# Patient Record
Sex: MALE | Race: WHITE | Employment: FULL TIME | URBAN - METROPOLITAN AREA
[De-identification: names, ages, dates, MRNs, and addresses within clinical notes are randomized per-mention and may not be internally consistent; named-entity substitution may affect disease eponyms.]

---

## 2019-03-11 ENCOUNTER — HOSPITAL ENCOUNTER (INPATIENT)
Age: 34
LOS: 2 days | Discharge: HOME | DRG: 065 | End: 2019-03-13
Attending: EMERGENCY MEDICINE | Admitting: INTERNAL MEDICINE
Payer: COMMERCIAL

## 2019-03-11 ENCOUNTER — APPOINTMENT (OUTPATIENT)
Dept: CT IMAGING | Age: 34
DRG: 065 | End: 2019-03-11
Payer: COMMERCIAL

## 2019-03-11 ENCOUNTER — APPOINTMENT (OUTPATIENT)
Dept: MRI IMAGING | Age: 34
DRG: 065 | End: 2019-03-11
Payer: COMMERCIAL

## 2019-03-11 DIAGNOSIS — Q21.12 PFO (PATENT FORAMEN OVALE): ICD-10-CM

## 2019-03-11 DIAGNOSIS — I63.9 CEREBROVASCULAR ACCIDENT (CVA), UNSPECIFIED MECHANISM (HCC): Primary | ICD-10-CM

## 2019-03-11 PROBLEM — D72.829 LEUKOCYTOSIS: Status: ACTIVE | Noted: 2019-03-11

## 2019-03-11 PROBLEM — R51.9 HEAD ACHE: Status: ACTIVE | Noted: 2019-03-11

## 2019-03-11 LAB
A/G RATIO: 1.6 (ref 1.1–2.2)
ALBUMIN SERPL-MCNC: 4.5 G/DL (ref 3.4–5)
ALP BLD-CCNC: 50 U/L (ref 40–129)
ALT SERPL-CCNC: 17 U/L (ref 10–40)
AMPHETAMINE SCREEN, URINE: ABNORMAL
ANION GAP SERPL CALCULATED.3IONS-SCNC: 12 MMOL/L (ref 3–16)
AST SERPL-CCNC: 16 U/L (ref 15–37)
BARBITURATE SCREEN URINE: ABNORMAL
BASOPHILS ABSOLUTE: 0.1 K/UL (ref 0–0.2)
BASOPHILS RELATIVE PERCENT: 1.1 %
BENZODIAZEPINE SCREEN, URINE: ABNORMAL
BILIRUB SERPL-MCNC: 0.5 MG/DL (ref 0–1)
BUN BLDV-MCNC: 10 MG/DL (ref 7–20)
CALCIUM SERPL-MCNC: 9.4 MG/DL (ref 8.3–10.6)
CANNABINOID SCREEN URINE: POSITIVE
CHLORIDE BLD-SCNC: 103 MMOL/L (ref 99–110)
CO2: 25 MMOL/L (ref 21–32)
COCAINE METABOLITE SCREEN URINE: ABNORMAL
CREAT SERPL-MCNC: 0.7 MG/DL (ref 0.9–1.3)
EOSINOPHILS ABSOLUTE: 0 K/UL (ref 0–0.6)
EOSINOPHILS RELATIVE PERCENT: 0 %
GFR AFRICAN AMERICAN: >60
GFR NON-AFRICAN AMERICAN: >60
GLOBULIN: 2.9 G/DL
GLUCOSE BLD-MCNC: 102 MG/DL (ref 70–99)
HCT VFR BLD CALC: 45.7 % (ref 40.5–52.5)
HEMOGLOBIN: 15.3 G/DL (ref 13.5–17.5)
INR BLD: 1.03 (ref 0.86–1.14)
LYMPHOCYTES ABSOLUTE: 1.3 K/UL (ref 1–5.1)
LYMPHOCYTES RELATIVE PERCENT: 10.5 %
Lab: ABNORMAL
MCH RBC QN AUTO: 30 PG (ref 26–34)
MCHC RBC AUTO-ENTMCNC: 33.4 G/DL (ref 31–36)
MCV RBC AUTO: 90 FL (ref 80–100)
METHADONE SCREEN, URINE: ABNORMAL
MONOCYTES ABSOLUTE: 0.5 K/UL (ref 0–1.3)
MONOCYTES RELATIVE PERCENT: 3.9 %
NEUTROPHILS ABSOLUTE: 10.3 K/UL (ref 1.7–7.7)
NEUTROPHILS RELATIVE PERCENT: 84.5 %
OPIATE SCREEN URINE: ABNORMAL
OXYCODONE URINE: ABNORMAL
PDW BLD-RTO: 13.3 % (ref 12.4–15.4)
PH UA: 5
PHENCYCLIDINE SCREEN URINE: ABNORMAL
PLATELET # BLD: 351 K/UL (ref 135–450)
PMV BLD AUTO: 7.8 FL (ref 5–10.5)
POTASSIUM SERPL-SCNC: 4 MMOL/L (ref 3.5–5.1)
PROPOXYPHENE SCREEN: ABNORMAL
PROTHROMBIN TIME: 11.7 SEC (ref 9.8–13)
RBC # BLD: 5.08 M/UL (ref 4.2–5.9)
SODIUM BLD-SCNC: 140 MMOL/L (ref 136–145)
TOTAL PROTEIN: 7.4 G/DL (ref 6.4–8.2)
TROPONIN: <0.01 NG/ML
TROPONIN: <0.01 NG/ML
WBC # BLD: 12.1 K/UL (ref 4–11)

## 2019-03-11 PROCEDURE — 6360000002 HC RX W HCPCS: Performed by: NURSE PRACTITIONER

## 2019-03-11 PROCEDURE — 70498 CT ANGIOGRAPHY NECK: CPT

## 2019-03-11 PROCEDURE — 70450 CT HEAD/BRAIN W/O DYE: CPT

## 2019-03-11 PROCEDURE — 6370000000 HC RX 637 (ALT 250 FOR IP): Performed by: INTERNAL MEDICINE

## 2019-03-11 PROCEDURE — 36415 COLL VENOUS BLD VENIPUNCTURE: CPT

## 2019-03-11 PROCEDURE — 2060000000 HC ICU INTERMEDIATE R&B

## 2019-03-11 PROCEDURE — 80053 COMPREHEN METABOLIC PANEL: CPT

## 2019-03-11 PROCEDURE — 70551 MRI BRAIN STEM W/O DYE: CPT

## 2019-03-11 PROCEDURE — 6370000000 HC RX 637 (ALT 250 FOR IP): Performed by: EMERGENCY MEDICINE

## 2019-03-11 PROCEDURE — 84484 ASSAY OF TROPONIN QUANT: CPT

## 2019-03-11 PROCEDURE — 96375 TX/PRO/DX INJ NEW DRUG ADDON: CPT

## 2019-03-11 PROCEDURE — 80307 DRUG TEST PRSMV CHEM ANLYZR: CPT

## 2019-03-11 PROCEDURE — 70496 CT ANGIOGRAPHY HEAD: CPT

## 2019-03-11 PROCEDURE — 99291 CRITICAL CARE FIRST HOUR: CPT

## 2019-03-11 PROCEDURE — 96361 HYDRATE IV INFUSION ADD-ON: CPT

## 2019-03-11 PROCEDURE — 85610 PROTHROMBIN TIME: CPT

## 2019-03-11 PROCEDURE — 97535 SELF CARE MNGMENT TRAINING: CPT

## 2019-03-11 PROCEDURE — 93005 ELECTROCARDIOGRAM TRACING: CPT | Performed by: EMERGENCY MEDICINE

## 2019-03-11 PROCEDURE — 99406 BEHAV CHNG SMOKING 3-10 MIN: CPT

## 2019-03-11 PROCEDURE — 85025 COMPLETE CBC W/AUTO DIFF WBC: CPT

## 2019-03-11 PROCEDURE — 6370000000 HC RX 637 (ALT 250 FOR IP): Performed by: NURSE PRACTITIONER

## 2019-03-11 PROCEDURE — 96374 THER/PROPH/DIAG INJ IV PUSH: CPT

## 2019-03-11 PROCEDURE — 97165 OT EVAL LOW COMPLEX 30 MIN: CPT

## 2019-03-11 PROCEDURE — 2580000003 HC RX 258: Performed by: NURSE PRACTITIONER

## 2019-03-11 PROCEDURE — 6360000002 HC RX W HCPCS: Performed by: INTERNAL MEDICINE

## 2019-03-11 PROCEDURE — 2580000003 HC RX 258: Performed by: INTERNAL MEDICINE

## 2019-03-11 PROCEDURE — 6360000004 HC RX CONTRAST MEDICATION: Performed by: EMERGENCY MEDICINE

## 2019-03-11 RX ORDER — DIPHENHYDRAMINE HYDROCHLORIDE 50 MG/ML
25 INJECTION INTRAMUSCULAR; INTRAVENOUS ONCE
Status: COMPLETED | OUTPATIENT
Start: 2019-03-11 | End: 2019-03-11

## 2019-03-11 RX ORDER — METOCLOPRAMIDE HYDROCHLORIDE 5 MG/ML
10 INJECTION INTRAMUSCULAR; INTRAVENOUS ONCE
Status: COMPLETED | OUTPATIENT
Start: 2019-03-11 | End: 2019-03-11

## 2019-03-11 RX ORDER — KETOROLAC TROMETHAMINE 30 MG/ML
30 INJECTION, SOLUTION INTRAMUSCULAR; INTRAVENOUS ONCE
Status: COMPLETED | OUTPATIENT
Start: 2019-03-11 | End: 2019-03-11

## 2019-03-11 RX ORDER — NICOTINE 21 MG/24HR
1 PATCH, TRANSDERMAL 24 HOURS TRANSDERMAL DAILY
Status: DISCONTINUED | OUTPATIENT
Start: 2019-03-12 | End: 2019-03-11

## 2019-03-11 RX ORDER — SODIUM CHLORIDE 0.9 % (FLUSH) 0.9 %
10 SYRINGE (ML) INJECTION EVERY 12 HOURS SCHEDULED
Status: DISCONTINUED | OUTPATIENT
Start: 2019-03-11 | End: 2019-03-13 | Stop reason: HOSPADM

## 2019-03-11 RX ORDER — ATORVASTATIN CALCIUM 40 MG/1
40 TABLET, FILM COATED ORAL NIGHTLY
Status: DISCONTINUED | OUTPATIENT
Start: 2019-03-11 | End: 2019-03-13 | Stop reason: HOSPADM

## 2019-03-11 RX ORDER — ONDANSETRON 2 MG/ML
4 INJECTION INTRAMUSCULAR; INTRAVENOUS EVERY 6 HOURS PRN
Status: DISCONTINUED | OUTPATIENT
Start: 2019-03-11 | End: 2019-03-13 | Stop reason: HOSPADM

## 2019-03-11 RX ORDER — ASPIRIN 325 MG
325 TABLET ORAL ONCE
Status: COMPLETED | OUTPATIENT
Start: 2019-03-11 | End: 2019-03-11

## 2019-03-11 RX ORDER — NICOTINE 21 MG/24HR
1 PATCH, TRANSDERMAL 24 HOURS TRANSDERMAL DAILY
Status: DISCONTINUED | OUTPATIENT
Start: 2019-03-11 | End: 2019-03-13 | Stop reason: HOSPADM

## 2019-03-11 RX ORDER — 0.9 % SODIUM CHLORIDE 0.9 %
1000 INTRAVENOUS SOLUTION INTRAVENOUS ONCE
Status: COMPLETED | OUTPATIENT
Start: 2019-03-11 | End: 2019-03-11

## 2019-03-11 RX ORDER — SODIUM CHLORIDE 0.9 % (FLUSH) 0.9 %
10 SYRINGE (ML) INJECTION PRN
Status: DISCONTINUED | OUTPATIENT
Start: 2019-03-11 | End: 2019-03-13 | Stop reason: HOSPADM

## 2019-03-11 RX ORDER — ASPIRIN 325 MG
325 TABLET ORAL DAILY
Status: DISCONTINUED | OUTPATIENT
Start: 2019-03-11 | End: 2019-03-13 | Stop reason: HOSPADM

## 2019-03-11 RX ADMIN — KETOROLAC TROMETHAMINE 30 MG: 30 INJECTION, SOLUTION INTRAMUSCULAR at 17:37

## 2019-03-11 RX ADMIN — IOPAMIDOL 75 ML: 755 INJECTION, SOLUTION INTRAVENOUS at 12:51

## 2019-03-11 RX ADMIN — SODIUM CHLORIDE 1000 ML: 9 INJECTION, SOLUTION INTRAVENOUS at 10:15

## 2019-03-11 RX ADMIN — METOCLOPRAMIDE 10 MG: 5 INJECTION, SOLUTION INTRAMUSCULAR; INTRAVENOUS at 10:15

## 2019-03-11 RX ADMIN — KETOROLAC TROMETHAMINE 30 MG: 30 INJECTION, SOLUTION INTRAMUSCULAR; INTRAVENOUS at 10:14

## 2019-03-11 RX ADMIN — SODIUM CHLORIDE, PRESERVATIVE FREE 10 ML: 5 INJECTION INTRAVENOUS at 22:27

## 2019-03-11 RX ADMIN — ASPIRIN 325 MG: 325 TABLET, COATED ORAL at 13:53

## 2019-03-11 RX ADMIN — DIPHENHYDRAMINE HYDROCHLORIDE 25 MG: 50 INJECTION, SOLUTION INTRAMUSCULAR; INTRAVENOUS at 10:14

## 2019-03-11 RX ADMIN — ATORVASTATIN CALCIUM 40 MG: 40 TABLET, FILM COATED ORAL at 22:27

## 2019-03-11 ASSESSMENT — PAIN DESCRIPTION - LOCATION
LOCATION: HEAD

## 2019-03-11 ASSESSMENT — VISUAL ACUITY
OU: 20/20
OS: 20/25
OD: 20/25

## 2019-03-11 ASSESSMENT — PAIN DESCRIPTION - FREQUENCY: FREQUENCY: INTERMITTENT

## 2019-03-11 ASSESSMENT — PAIN DESCRIPTION - PROGRESSION: CLINICAL_PROGRESSION: GRADUALLY IMPROVING

## 2019-03-11 ASSESSMENT — PAIN SCALES - GENERAL
PAINLEVEL_OUTOF10: 10
PAINLEVEL_OUTOF10: 5
PAINLEVEL_OUTOF10: 2
PAINLEVEL_OUTOF10: 4
PAINLEVEL_OUTOF10: 5
PAINLEVEL_OUTOF10: 10
PAINLEVEL_OUTOF10: 7

## 2019-03-11 ASSESSMENT — PAIN DESCRIPTION - PAIN TYPE
TYPE: ACUTE PAIN

## 2019-03-11 ASSESSMENT — PAIN - FUNCTIONAL ASSESSMENT: PAIN_FUNCTIONAL_ASSESSMENT: PREVENTS OR INTERFERES WITH ALL ACTIVE AND SOME PASSIVE ACTIVITIES

## 2019-03-11 ASSESSMENT — PAIN DESCRIPTION - DESCRIPTORS: DESCRIPTORS: ACHING

## 2019-03-12 LAB
CHOLESTEROL, TOTAL: 140 MG/DL (ref 0–199)
HDLC SERPL-MCNC: 40 MG/DL (ref 40–60)
LDL CHOLESTEROL CALCULATED: 84 MG/DL
LV EF: 58 %
LVEF MODALITY: NORMAL
TRIGL SERPL-MCNC: 79 MG/DL (ref 0–150)
VLDLC SERPL CALC-MCNC: 16 MG/DL

## 2019-03-12 PROCEDURE — 99255 IP/OBS CONSLTJ NEW/EST HI 80: CPT | Performed by: PSYCHIATRY & NEUROLOGY

## 2019-03-12 PROCEDURE — 6370000000 HC RX 637 (ALT 250 FOR IP): Performed by: NURSE PRACTITIONER

## 2019-03-12 PROCEDURE — 2580000003 HC RX 258: Performed by: INTERNAL MEDICINE

## 2019-03-12 PROCEDURE — 36415 COLL VENOUS BLD VENIPUNCTURE: CPT

## 2019-03-12 PROCEDURE — 6360000002 HC RX W HCPCS: Performed by: INTERNAL MEDICINE

## 2019-03-12 PROCEDURE — 97161 PT EVAL LOW COMPLEX 20 MIN: CPT

## 2019-03-12 PROCEDURE — 2060000000 HC ICU INTERMEDIATE R&B

## 2019-03-12 PROCEDURE — 6370000000 HC RX 637 (ALT 250 FOR IP): Performed by: INTERNAL MEDICINE

## 2019-03-12 PROCEDURE — 93306 TTE W/DOPPLER COMPLETE: CPT

## 2019-03-12 PROCEDURE — 80061 LIPID PANEL: CPT

## 2019-03-12 RX ADMIN — ENOXAPARIN SODIUM 40 MG: 40 INJECTION SUBCUTANEOUS at 08:41

## 2019-03-12 RX ADMIN — SODIUM CHLORIDE, PRESERVATIVE FREE 10 ML: 5 INJECTION INTRAVENOUS at 21:11

## 2019-03-12 RX ADMIN — ASPIRIN 325 MG: 325 TABLET, COATED ORAL at 08:40

## 2019-03-12 RX ADMIN — ATORVASTATIN CALCIUM 40 MG: 40 TABLET, FILM COATED ORAL at 21:11

## 2019-03-12 RX ADMIN — SODIUM CHLORIDE, PRESERVATIVE FREE 10 ML: 5 INJECTION INTRAVENOUS at 08:41

## 2019-03-12 ASSESSMENT — PAIN SCALES - GENERAL
PAINLEVEL_OUTOF10: 0

## 2019-03-13 ENCOUNTER — ANESTHESIA EVENT (OUTPATIENT)
Dept: CARDIAC CATH/INVASIVE PROCEDURES | Age: 34
DRG: 065 | End: 2019-03-13
Payer: COMMERCIAL

## 2019-03-13 ENCOUNTER — ANESTHESIA (OUTPATIENT)
Dept: CARDIAC CATH/INVASIVE PROCEDURES | Age: 34
DRG: 065 | End: 2019-03-13
Payer: COMMERCIAL

## 2019-03-13 ENCOUNTER — TELEPHONE (OUTPATIENT)
Dept: CARDIOLOGY | Age: 34
End: 2019-03-13

## 2019-03-13 ENCOUNTER — APPOINTMENT (OUTPATIENT)
Dept: CARDIAC CATH/INVASIVE PROCEDURES | Age: 34
DRG: 065 | End: 2019-03-13
Payer: COMMERCIAL

## 2019-03-13 VITALS — OXYGEN SATURATION: 100 % | DIASTOLIC BLOOD PRESSURE: 47 MMHG | SYSTOLIC BLOOD PRESSURE: 89 MMHG

## 2019-03-13 VITALS
HEART RATE: 70 BPM | SYSTOLIC BLOOD PRESSURE: 113 MMHG | WEIGHT: 157.1 LBS | DIASTOLIC BLOOD PRESSURE: 72 MMHG | BODY MASS INDEX: 21.28 KG/M2 | TEMPERATURE: 97.5 F | RESPIRATION RATE: 16 BRPM | OXYGEN SATURATION: 97 % | HEIGHT: 72 IN

## 2019-03-13 LAB
EKG ATRIAL RATE: 75 BPM
EKG DIAGNOSIS: NORMAL
EKG P AXIS: 67 DEGREES
EKG P-R INTERVAL: 176 MS
EKG Q-T INTERVAL: 370 MS
EKG QRS DURATION: 92 MS
EKG QTC CALCULATION (BAZETT): 413 MS
EKG R AXIS: -22 DEGREES
EKG T AXIS: 49 DEGREES
EKG VENTRICULAR RATE: 75 BPM
LV EF: 58 %
LVEF MODALITY: NORMAL

## 2019-03-13 PROCEDURE — 3700000001 HC ADD 15 MINUTES (ANESTHESIA)

## 2019-03-13 PROCEDURE — 93315 ECHO TRANSESOPHAGEAL: CPT

## 2019-03-13 PROCEDURE — 93010 ELECTROCARDIOGRAM REPORT: CPT | Performed by: INTERNAL MEDICINE

## 2019-03-13 PROCEDURE — 99232 SBSQ HOSP IP/OBS MODERATE 35: CPT | Performed by: PSYCHIATRY & NEUROLOGY

## 2019-03-13 PROCEDURE — 2500000003 HC RX 250 WO HCPCS: Performed by: NURSE ANESTHETIST, CERTIFIED REGISTERED

## 2019-03-13 PROCEDURE — 2580000003 HC RX 258

## 2019-03-13 PROCEDURE — 3700000000 HC ANESTHESIA ATTENDED CARE

## 2019-03-13 PROCEDURE — 93325 DOPPLER ECHO COLOR FLOW MAPG: CPT

## 2019-03-13 PROCEDURE — 6370000000 HC RX 637 (ALT 250 FOR IP): Performed by: NURSE PRACTITIONER

## 2019-03-13 PROCEDURE — 6370000000 HC RX 637 (ALT 250 FOR IP): Performed by: INTERNAL MEDICINE

## 2019-03-13 PROCEDURE — 93320 DOPPLER ECHO COMPLETE: CPT

## 2019-03-13 PROCEDURE — 93970 EXTREMITY STUDY: CPT

## 2019-03-13 PROCEDURE — 6360000002 HC RX W HCPCS: Performed by: NURSE ANESTHETIST, CERTIFIED REGISTERED

## 2019-03-13 PROCEDURE — 2580000003 HC RX 258: Performed by: NURSE ANESTHETIST, CERTIFIED REGISTERED

## 2019-03-13 PROCEDURE — 2580000003 HC RX 258: Performed by: INTERNAL MEDICINE

## 2019-03-13 PROCEDURE — B24BZZ4 ULTRASONOGRAPHY OF HEART WITH AORTA, TRANSESOPHAGEAL: ICD-10-PCS | Performed by: INTERNAL MEDICINE

## 2019-03-13 RX ORDER — ASPIRIN 325 MG
325 TABLET ORAL DAILY
Qty: 30 TABLET | Refills: 3 | COMMUNITY
Start: 2019-03-14 | End: 2019-03-15

## 2019-03-13 RX ORDER — LIDOCAINE HYDROCHLORIDE 20 MG/ML
INJECTION, SOLUTION EPIDURAL; INFILTRATION; INTRACAUDAL; PERINEURAL PRN
Status: DISCONTINUED | OUTPATIENT
Start: 2019-03-13 | End: 2019-03-13 | Stop reason: SDUPTHER

## 2019-03-13 RX ORDER — ATORVASTATIN CALCIUM 40 MG/1
40 TABLET, FILM COATED ORAL NIGHTLY
Qty: 30 TABLET | Refills: 3 | Status: SHIPPED | OUTPATIENT
Start: 2019-03-13 | End: 2019-11-06 | Stop reason: SDUPTHER

## 2019-03-13 RX ORDER — SODIUM CHLORIDE 9 MG/ML
INJECTION, SOLUTION INTRAVENOUS CONTINUOUS PRN
Status: DISCONTINUED | OUTPATIENT
Start: 2019-03-13 | End: 2019-03-13 | Stop reason: SDUPTHER

## 2019-03-13 RX ORDER — PROPOFOL 10 MG/ML
INJECTION, EMULSION INTRAVENOUS PRN
Status: DISCONTINUED | OUTPATIENT
Start: 2019-03-13 | End: 2019-03-13 | Stop reason: SDUPTHER

## 2019-03-13 RX ADMIN — ASPIRIN 325 MG: 325 TABLET, COATED ORAL at 08:28

## 2019-03-13 RX ADMIN — LIDOCAINE HYDROCHLORIDE 40 MG: 20 INJECTION, SOLUTION EPIDURAL; INFILTRATION; INTRACAUDAL; PERINEURAL at 10:30

## 2019-03-13 RX ADMIN — PROPOFOL 30 MG: 10 INJECTION, EMULSION INTRAVENOUS at 10:42

## 2019-03-13 RX ADMIN — SODIUM CHLORIDE, PRESERVATIVE FREE 10 ML: 5 INJECTION INTRAVENOUS at 08:27

## 2019-03-13 RX ADMIN — PROPOFOL 30 MG: 10 INJECTION, EMULSION INTRAVENOUS at 10:35

## 2019-03-13 RX ADMIN — SODIUM CHLORIDE: 9 INJECTION, SOLUTION INTRAVENOUS at 10:21

## 2019-03-13 RX ADMIN — PROPOFOL 30 MG: 10 INJECTION, EMULSION INTRAVENOUS at 10:45

## 2019-03-13 RX ADMIN — PROPOFOL 30 MG: 10 INJECTION, EMULSION INTRAVENOUS at 10:40

## 2019-03-13 RX ADMIN — PROPOFOL 150 MG: 10 INJECTION, EMULSION INTRAVENOUS at 10:30

## 2019-03-13 RX ADMIN — PHENYLEPHRINE HYDROCHLORIDE 50 MCG: 10 INJECTION INTRAVENOUS at 10:47

## 2019-03-13 RX ADMIN — PROPOFOL 30 MG: 10 INJECTION, EMULSION INTRAVENOUS at 10:33

## 2019-03-13 ASSESSMENT — PAIN SCALES - GENERAL
PAINLEVEL_OUTOF10: 0
PAINLEVEL_OUTOF10: 0

## 2019-03-13 ASSESSMENT — LIFESTYLE VARIABLES: SMOKING_STATUS: 1

## 2019-03-15 ENCOUNTER — OFFICE VISIT (OUTPATIENT)
Dept: CARDIOLOGY CLINIC | Age: 34
End: 2019-03-15
Payer: COMMERCIAL

## 2019-03-15 VITALS
SYSTOLIC BLOOD PRESSURE: 118 MMHG | WEIGHT: 162 LBS | HEIGHT: 72 IN | BODY MASS INDEX: 21.94 KG/M2 | OXYGEN SATURATION: 98 % | DIASTOLIC BLOOD PRESSURE: 80 MMHG

## 2019-03-15 DIAGNOSIS — Q21.12 PFO (PATENT FORAMEN OVALE): ICD-10-CM

## 2019-03-15 DIAGNOSIS — I63.9 CEREBROVASCULAR ACCIDENT (CVA), UNSPECIFIED MECHANISM (HCC): ICD-10-CM

## 2019-03-15 DIAGNOSIS — Q21.12 PFO (PATENT FORAMEN OVALE): Primary | ICD-10-CM

## 2019-03-15 LAB — HOMOCYSTEINE: 8 UMOL/L (ref 0–10)

## 2019-03-15 PROCEDURE — 99205 OFFICE O/P NEW HI 60 MIN: CPT | Performed by: INTERNAL MEDICINE

## 2019-03-15 RX ORDER — CLOPIDOGREL BISULFATE 75 MG/1
75 TABLET ORAL DAILY
Qty: 30 TABLET | Refills: 11 | Status: ON HOLD | OUTPATIENT
Start: 2019-03-15 | End: 2019-05-10 | Stop reason: SDUPTHER

## 2019-03-18 ENCOUNTER — HOSPITAL ENCOUNTER (OUTPATIENT)
Dept: OCCUPATIONAL THERAPY | Age: 34
Setting detail: THERAPIES SERIES
Discharge: HOME | End: 2019-03-18
Payer: COMMERCIAL

## 2019-03-18 LAB
ANTICARDIOLIPIN IGG ANTIBODY: 5 GPL (ref 0–14)
BETA-2 GLYCOPROTEIN 1 IGG ANTIBODY: 0 SGU (ref 0–20)
BETA-2 GLYCOPROTEIN 1 IGM ANTIBODY: 7 SMU (ref 0–20)
CARDIOLIPIN AB IGM: 4 MPL (ref 0–12)
FACTOR VIII ACTIVITY: 120 % (ref 50–150)
PROTEIN C FUNCTIONAL: 141 % (ref 83–168)
PROTEIN S, FUNCTIONAL: 98 % (ref 66–143)

## 2019-03-18 PROCEDURE — 97110 THERAPEUTIC EXERCISES: CPT

## 2019-03-18 PROCEDURE — 97530 THERAPEUTIC ACTIVITIES: CPT

## 2019-03-18 PROCEDURE — 97165 OT EVAL LOW COMPLEX 30 MIN: CPT

## 2019-03-18 ASSESSMENT — PAIN DESCRIPTION - ORIENTATION: ORIENTATION: LEFT

## 2019-03-18 ASSESSMENT — PAIN SCALES - GENERAL: PAINLEVEL_OUTOF10: 2

## 2019-03-18 ASSESSMENT — PAIN DESCRIPTION - PAIN TYPE: TYPE: ACUTE PAIN

## 2019-03-18 ASSESSMENT — PAIN DESCRIPTION - LOCATION: LOCATION: HAND

## 2019-03-19 LAB
ANTITHROMBIN ACTIVITY: 117 % (ref 76–128)
ANTITHROMBIN ANTIGEN: 108 % (ref 82–136)
DRVVT CONFIRMATION TEST: NORMAL RATIO
DRVVT SCREEN: 38 SEC (ref 33–44)
DRVVT,DIL: NORMAL SEC (ref 33–44)
HEXAGONAL PHOSPHOLIPID NEUTRALIZAT TEST: NORMAL
LUPUS ANTICOAG INTERP: NORMAL
PLT NEUTA: NORMAL
PT D: 12.4 SEC (ref 12–15.5)
PTT D: 41 SEC (ref 32–48)
PTT-D CORR REFLEX: NORMAL SEC (ref 32–48)
PTT-HEPARIN NEUTRALIZED: NORMAL SEC (ref 32–48)
REPTILASE TIME: NORMAL SEC
THROMBIN TIME: NORMAL SEC (ref 14.7–19.5)

## 2019-03-20 ENCOUNTER — HOSPITAL ENCOUNTER (OUTPATIENT)
Dept: OCCUPATIONAL THERAPY | Age: 34
Setting detail: THERAPIES SERIES
End: 2019-03-20
Payer: COMMERCIAL

## 2019-03-20 LAB
FACTOR V LEIDEN: NEGATIVE
SPECIMEN: NORMAL

## 2019-03-21 ENCOUNTER — HOSPITAL ENCOUNTER (OUTPATIENT)
Dept: OCCUPATIONAL THERAPY | Age: 34
Setting detail: THERAPIES SERIES
Discharge: HOME | End: 2019-03-21
Payer: COMMERCIAL

## 2019-03-21 PROCEDURE — 97535 SELF CARE MNGMENT TRAINING: CPT

## 2019-03-21 PROCEDURE — 97112 NEUROMUSCULAR REEDUCATION: CPT

## 2019-03-22 ENCOUNTER — OFFICE VISIT (OUTPATIENT)
Dept: FAMILY MEDICINE CLINIC | Age: 34
End: 2019-03-22
Payer: COMMERCIAL

## 2019-03-22 VITALS
RESPIRATION RATE: 16 BRPM | OXYGEN SATURATION: 98 % | HEART RATE: 96 BPM | SYSTOLIC BLOOD PRESSURE: 100 MMHG | DIASTOLIC BLOOD PRESSURE: 70 MMHG | WEIGHT: 161.8 LBS | BODY MASS INDEX: 21.91 KG/M2 | HEIGHT: 72 IN

## 2019-03-22 DIAGNOSIS — Q21.12 PFO (PATENT FORAMEN OVALE): ICD-10-CM

## 2019-03-22 DIAGNOSIS — Z00.00 HEALTHCARE MAINTENANCE: Primary | ICD-10-CM

## 2019-03-22 DIAGNOSIS — I63.9 CEREBROVASCULAR ACCIDENT (CVA), UNSPECIFIED MECHANISM (HCC): ICD-10-CM

## 2019-03-22 LAB
PROTHROMBIN G20210A MUTATION: NEGATIVE
PT PCR SPECIMEN: NORMAL

## 2019-03-22 PROCEDURE — 99385 PREV VISIT NEW AGE 18-39: CPT | Performed by: FAMILY MEDICINE

## 2019-03-22 PROCEDURE — 90715 TDAP VACCINE 7 YRS/> IM: CPT | Performed by: FAMILY MEDICINE

## 2019-03-22 PROCEDURE — 90471 IMMUNIZATION ADMIN: CPT | Performed by: FAMILY MEDICINE

## 2019-03-22 ASSESSMENT — PATIENT HEALTH QUESTIONNAIRE - PHQ9
SUM OF ALL RESPONSES TO PHQ9 QUESTIONS 1 & 2: 0
SUM OF ALL RESPONSES TO PHQ QUESTIONS 1-9: 0
SUM OF ALL RESPONSES TO PHQ QUESTIONS 1-9: 0
1. LITTLE INTEREST OR PLEASURE IN DOING THINGS: 0
2. FEELING DOWN, DEPRESSED OR HOPELESS: 0

## 2019-03-22 ASSESSMENT — ENCOUNTER SYMPTOMS: BLOOD IN STOOL: 0

## 2019-03-25 ENCOUNTER — APPOINTMENT (OUTPATIENT)
Dept: OCCUPATIONAL THERAPY | Age: 34
End: 2019-03-25
Payer: COMMERCIAL

## 2019-03-26 ENCOUNTER — TELEPHONE (OUTPATIENT)
Dept: CARDIOLOGY CLINIC | Age: 34
End: 2019-03-26

## 2019-03-26 DIAGNOSIS — I63.9 CEREBROVASCULAR ACCIDENT (CVA), UNSPECIFIED MECHANISM (HCC): Primary | ICD-10-CM

## 2019-03-26 DIAGNOSIS — Q21.12 PFO (PATENT FORAMEN OVALE): ICD-10-CM

## 2019-03-27 ENCOUNTER — APPOINTMENT (OUTPATIENT)
Dept: OCCUPATIONAL THERAPY | Age: 34
End: 2019-03-27
Payer: COMMERCIAL

## 2019-03-27 ENCOUNTER — TELEPHONE (OUTPATIENT)
Dept: CARDIOLOGY CLINIC | Age: 34
End: 2019-03-27

## 2019-04-03 ENCOUNTER — TELEPHONE (OUTPATIENT)
Dept: CARDIOLOGY CLINIC | Age: 34
End: 2019-04-03

## 2019-04-03 NOTE — TELEPHONE ENCOUNTER
Please advise if okay for work note with no restrictions. Pt is scheduled for PFO closure on 5/23/19 with Dr. Michelle Mariscal.

## 2019-05-03 LAB
HCT VFR BLD CALC: 45.3 % (ref 40.5–52.5)
HEMOGLOBIN: 15.3 G/DL (ref 13.5–17.5)
INR BLD: 0.96 (ref 0.86–1.14)
MCH RBC QN AUTO: 30.9 PG (ref 26–34)
MCHC RBC AUTO-ENTMCNC: 33.6 G/DL (ref 31–36)
MCV RBC AUTO: 91.9 FL (ref 80–100)
PDW BLD-RTO: 13.6 % (ref 12.4–15.4)
PLATELET # BLD: 333 K/UL (ref 135–450)
PMV BLD AUTO: 8 FL (ref 5–10.5)
PROTHROMBIN TIME: 10.9 SEC (ref 9.8–13)
RBC # BLD: 4.94 M/UL (ref 4.2–5.9)
WBC # BLD: 7 K/UL (ref 4–11)

## 2019-05-04 LAB
ANION GAP SERPL CALCULATED.3IONS-SCNC: 11 MMOL/L (ref 3–16)
BUN BLDV-MCNC: 15 MG/DL (ref 7–20)
CALCIUM SERPL-MCNC: 9.8 MG/DL (ref 8.3–10.6)
CHLORIDE BLD-SCNC: 100 MMOL/L (ref 99–110)
CO2: 28 MMOL/L (ref 21–32)
CREAT SERPL-MCNC: 1 MG/DL (ref 0.9–1.3)
GFR AFRICAN AMERICAN: >60
GFR NON-AFRICAN AMERICAN: >60
GLUCOSE BLD-MCNC: 98 MG/DL (ref 70–99)
POTASSIUM SERPL-SCNC: 4.6 MMOL/L (ref 3.5–5.1)
SODIUM BLD-SCNC: 139 MMOL/L (ref 136–145)

## 2019-05-08 ENCOUNTER — PRE-PROCEDURE TELEPHONE (OUTPATIENT)
Dept: CARDIAC CATH/INVASIVE PROCEDURES | Age: 34
End: 2019-05-08

## 2019-05-09 ENCOUNTER — TELEPHONE (OUTPATIENT)
Dept: CARDIOLOGY CLINIC | Age: 34
End: 2019-05-09

## 2019-05-09 ENCOUNTER — HOSPITAL ENCOUNTER (OUTPATIENT)
Dept: CARDIAC CATH/INVASIVE PROCEDURES | Age: 34
Setting detail: OBSERVATION
Discharge: HOME | End: 2019-05-10
Attending: INTERNAL MEDICINE | Admitting: INTERNAL MEDICINE
Payer: COMMERCIAL

## 2019-05-09 DIAGNOSIS — Q21.12 PFO (PATENT FORAMEN OVALE): ICD-10-CM

## 2019-05-09 LAB
EKG ATRIAL RATE: 66 BPM
EKG DIAGNOSIS: NORMAL
EKG P AXIS: 58 DEGREES
EKG P-R INTERVAL: 188 MS
EKG Q-T INTERVAL: 382 MS
EKG QRS DURATION: 92 MS
EKG QTC CALCULATION (BAZETT): 400 MS
EKG R AXIS: -42 DEGREES
EKG T AXIS: 63 DEGREES
EKG VENTRICULAR RATE: 66 BPM
POC ACT LR: 170 SEC
POC ACT LR: 228 SEC

## 2019-05-09 PROCEDURE — 1200000000 HC SEMI PRIVATE

## 2019-05-09 PROCEDURE — 93010 ELECTROCARDIOGRAM REPORT: CPT | Performed by: INTERNAL MEDICINE

## 2019-05-09 PROCEDURE — 93580 TRANSCATH CLOSURE OF ASD: CPT | Performed by: INTERNAL MEDICINE

## 2019-05-09 PROCEDURE — 2580000003 HC RX 258: Performed by: INTERNAL MEDICINE

## 2019-05-09 PROCEDURE — G0378 HOSPITAL OBSERVATION PER HR: HCPCS

## 2019-05-09 PROCEDURE — 2709999900 HC NON-CHARGEABLE SUPPLY

## 2019-05-09 PROCEDURE — 93005 ELECTROCARDIOGRAM TRACING: CPT | Performed by: INTERNAL MEDICINE

## 2019-05-09 PROCEDURE — C1817 SEPTAL DEFECT IMP SYS: HCPCS

## 2019-05-09 PROCEDURE — 99152 MOD SED SAME PHYS/QHP 5/>YRS: CPT

## 2019-05-09 PROCEDURE — G0379 DIRECT REFER HOSPITAL OBSERV: HCPCS

## 2019-05-09 PROCEDURE — 2500000003 HC RX 250 WO HCPCS

## 2019-05-09 PROCEDURE — 6360000002 HC RX W HCPCS

## 2019-05-09 PROCEDURE — APPSS30 APP SPLIT SHARED TIME 16-30 MINUTES: Performed by: NURSE PRACTITIONER

## 2019-05-09 PROCEDURE — 6360000002 HC RX W HCPCS: Performed by: INTERNAL MEDICINE

## 2019-05-09 PROCEDURE — 93662 INTRACARDIAC ECG (ICE): CPT | Performed by: INTERNAL MEDICINE

## 2019-05-09 PROCEDURE — 93580 TRANSCATH CLOSURE OF ASD: CPT

## 2019-05-09 PROCEDURE — 99153 MOD SED SAME PHYS/QHP EA: CPT

## 2019-05-09 PROCEDURE — 93662 INTRACARDIAC ECG (ICE): CPT

## 2019-05-09 PROCEDURE — 85347 COAGULATION TIME ACTIVATED: CPT

## 2019-05-09 PROCEDURE — C1759 CATH, INTRA ECHOCARDIOGRAPHY: HCPCS

## 2019-05-09 PROCEDURE — C1894 INTRO/SHEATH, NON-LASER: HCPCS

## 2019-05-09 PROCEDURE — 6370000000 HC RX 637 (ALT 250 FOR IP): Performed by: INTERNAL MEDICINE

## 2019-05-09 PROCEDURE — C1769 GUIDE WIRE: HCPCS

## 2019-05-09 RX ORDER — ATORVASTATIN CALCIUM 40 MG/1
40 TABLET, FILM COATED ORAL NIGHTLY
Status: DISCONTINUED | OUTPATIENT
Start: 2019-05-09 | End: 2019-05-10 | Stop reason: HOSPADM

## 2019-05-09 RX ORDER — ASPIRIN 81 MG/1
81 TABLET, CHEWABLE ORAL DAILY
Status: DISCONTINUED | OUTPATIENT
Start: 2019-05-09 | End: 2019-05-10 | Stop reason: HOSPADM

## 2019-05-09 RX ORDER — CLOPIDOGREL BISULFATE 75 MG/1
75 TABLET ORAL DAILY
Status: DISCONTINUED | OUTPATIENT
Start: 2019-05-10 | End: 2019-05-10 | Stop reason: HOSPADM

## 2019-05-09 RX ORDER — ONDANSETRON 2 MG/ML
4 INJECTION INTRAMUSCULAR; INTRAVENOUS EVERY 6 HOURS PRN
Status: DISCONTINUED | OUTPATIENT
Start: 2019-05-09 | End: 2019-05-10 | Stop reason: HOSPADM

## 2019-05-09 RX ORDER — SODIUM CHLORIDE 0.9 % (FLUSH) 0.9 %
10 SYRINGE (ML) INJECTION PRN
Status: DISCONTINUED | OUTPATIENT
Start: 2019-05-09 | End: 2019-05-10 | Stop reason: HOSPADM

## 2019-05-09 RX ORDER — SODIUM CHLORIDE 9 MG/ML
INJECTION, SOLUTION INTRAVENOUS CONTINUOUS
Status: DISCONTINUED | OUTPATIENT
Start: 2019-05-09 | End: 2019-05-10

## 2019-05-09 RX ORDER — ASPIRIN 325 MG
325 TABLET ORAL DAILY
Status: ON HOLD | COMMUNITY
End: 2019-05-10 | Stop reason: HOSPADM

## 2019-05-09 RX ORDER — ACETAMINOPHEN 325 MG/1
650 TABLET ORAL EVERY 4 HOURS PRN
Status: DISCONTINUED | OUTPATIENT
Start: 2019-05-09 | End: 2019-05-10 | Stop reason: HOSPADM

## 2019-05-09 RX ORDER — CEFAZOLIN SODIUM 1 G/3ML
1 INJECTION, POWDER, FOR SOLUTION INTRAMUSCULAR; INTRAVENOUS EVERY 8 HOURS
Status: DISCONTINUED | OUTPATIENT
Start: 2019-05-09 | End: 2019-05-09

## 2019-05-09 RX ORDER — SODIUM CHLORIDE 0.9 % (FLUSH) 0.9 %
10 SYRINGE (ML) INJECTION EVERY 12 HOURS SCHEDULED
Status: DISCONTINUED | OUTPATIENT
Start: 2019-05-09 | End: 2019-05-10 | Stop reason: HOSPADM

## 2019-05-09 RX ADMIN — SODIUM CHLORIDE: 9 INJECTION, SOLUTION INTRAVENOUS at 16:06

## 2019-05-09 RX ADMIN — CEFAZOLIN 1 G: 1 INJECTION, POWDER, FOR SOLUTION INTRAMUSCULAR; INTRAVENOUS at 16:44

## 2019-05-09 RX ADMIN — ATORVASTATIN CALCIUM 40 MG: 40 TABLET, FILM COATED ORAL at 21:24

## 2019-05-09 RX ADMIN — CEFAZOLIN 1 G: 1 INJECTION, POWDER, FOR SOLUTION INTRAMUSCULAR; INTRAVENOUS at 23:57

## 2019-05-09 RX ADMIN — ACETAMINOPHEN 650 MG: 325 TABLET ORAL at 16:05

## 2019-05-09 ASSESSMENT — PAIN SCALES - GENERAL
PAINLEVEL_OUTOF10: 0
PAINLEVEL_OUTOF10: 2
PAINLEVEL_OUTOF10: 0

## 2019-05-09 NOTE — OP NOTE
Abbe Garay  29 y.o.  [id number]    Referring MD:  Dr Tanya Cid   Procedure:  PFO closure    Assistant:  Dr. Jimy Gibbs (assisiting with intracradiac echo, particularly during device deployment)    Indication:  CVA    After informed consent was obtained and history and exam reviewed, the patient was taken to the cardiac cath lab. The patient had both groins prepped and draped in sterile fashion. 1% Xylocaine was used to anesthetize the right groin. A needle was inserted into the right femoral vein and a wire was inserted. A second venipuncture was performed and a second wire was inserted. A long 9 Fr sheath was inserted in the inferior site. A 6 Fr sheath was inserted into superior site. Continuous pulse-oximetry and ECG monitoring was performed, and frequent blood pressure assessment was performed. An intracardiac echocardiogram (ICE) was performed evaluating the atria, valvular structures, the aortic valve, and the atrial septum (done via the long 9 Fr sheath). Next, a J wire was inserted through a 5 Fr MP catheter in the 6 Fr sheath. The J wire was placed in a left pulmonary vein. The MP catheter was advanced into the pulmonary vein. Heparin was administered to maintain an ACT over 250 seconds. The J wire was then exchanged for an exchange-length Amplatz wire. The catheter and sheath were removed. A 9 Fr Torqvue delivery sheath was then inserted. When the sheath was across the atrial septal defect, the dilator and Amplatz wire were removed. The sheath was flushed. Under ICE and fluoroscopic guidance, a 25 mm PFO occluder device was inserted through the Torqvue sheath into the left atrium. The left atrial disk was deployed. The sheath/device combination was then withdrawn such that the left atrial disk approximated the atrial septum. The right atrial disk was then deployed. We then interrogated the device and defect with ICE.     Post-deployment, a \"wiggle test\" was done under ICE and

## 2019-05-09 NOTE — LETTER
[address]  [address] [facility] [address]  Phone: [phone]             May 10, 2019    Patient: Claire Boland   YOB: 1985   Date of Visit: 5/9/2019       To Whom It May Concern:    Guido Krishnamurthy was seen and treated in our facility  beginning 5/9/2019 until {DISCHARGE DATE 5-10-19}. No heavy lifting nothing over 10lbs for 10 days avoid putting pressure on right groin area such as sitting up-right in a chair. , may climb stairs slowly.   Sincerely,  For Dr. Naheed Fuller RNC 4-    Suzanna Gonzalez RN         Signature:__________________________________

## 2019-05-09 NOTE — TELEPHONE ENCOUNTER
I received EOS report from [address] [place] [address] but the report was not for this pt. I contacted [address] [place] [address] again and this was the response:    11:26:21am] You   Hello, I have requested an EOS report for pt Jeff Murphy  1985 to be faxed to our office. I have received a report that is not on the correct patient. Can you please send us the correct EOS report on the above pt STAT as Dr. Srini Reynolds is requesting the report. thank you fax is [phone]   [11:26:28am] Nona:   Tamara, thank you for using [employer] live chat. One moment as I look into this request for you. [11:30:46am] Nona:   I do not see an enrollment with that patient's name spelling or . [11:30:54am] Nona:   Was it a device you gave to the patient in office? [11:31:26am] You   No pt had device on when he came to us. Pt was given monitor from the hospital   [11:32:04am] Nona:   Okay if you are not the ordering provider or ordering practice we cannot give reports out, but I do not see any enrollment for this patient. Do you have the serial number of the device? [11:32:42am] You   I do not have serial number.  Pt had a procedure with Dr. Srini Reynolds this AM.   [11:33:13am] Sam Hodge:   Unfortunately I am unable to assist if there is no enrollment for this patient

## 2019-05-09 NOTE — RESEARCH
Neurology Note (Research). Patient seen after consent obtained for neurologic evaluation of PFO closure for cryptogenic stroke. HPI:    28 yo man with acute left sided headache and right vision changes that he woke up with 3/11. He reported some confusion around the time of the event but no speech/language changes or any weakness/sensory changes in limbs. Gait was not involved. He was taking no blood thinners. He was a smoker but stopped that day. Good recovery but some mild right visual symptoms. MRS  Score Description  1 No significant disability despite symptoms; able to carry out all usual duties and activities (some persistent right visual symptoms). NIH Stroke Scale      Time: 8:30 AM  Person Administering Scale: Selena Mayace    1a  Level of consciousness: 0=alert; keenly responsive   1b. LOC questions:  0=Performs both tasks correctly   1c. LOC commands: 0=Performs both tasks correctly   2. Best Gaze: 0=normal   3. Visual: 0=No visual loss   4. Facial Palsy: 0=Normal symmetric movement   5a. Motor left arm: 0=No drift, limb holds 90 (or 45) degrees for full 10 seconds   5b. Motor right arm: 0=No drift, limb holds 90 (or 45) degrees for full 10 seconds   6a. motor left le=No drift, limb holds 90 (or 45) degrees for full 10 seconds   6b  Motor right le=No drift, limb holds 90 (or 45) degrees for full 10 seconds   7. Limb Ataxia: 0=Absent   8. Sensory: 0=Normal; no sensory loss   9. Best Language:  0=No aphasia, normal   10. Dysarthria: 0=Normal   11. Extinction and Inattention: 0=No abnormality         Total:   0       Exam:  Temperature 97.5 °F (36.4 °C), temperature source Oral, height 6' 1\" (1.854 m), weight 162 lb (73.5 kg). Neurologic  Mental status:                   orientation to person and place. He knows the day of the week. Initially told me it was April but corrected to [employer] grossly intact.   Able to perform coin calculations and read the clock   Memory grossly intact. 4/4 at 5 min   Attention intact as able to attend well to the exam     Language fluent in conversation. Able to name objects and repeat sentence   Comprehension intact; follows simple commands including 2-step commands  Cranial nerves:   CN2: Visual Fields full w/o extinction on confrontational testing,   CN 3,4,6: extraocular muscles intact,  CN5: facial sensation symmetric   CN7:face symmetric without dysarthria,   CN8: hearing grossly intact   CN9: palate elevated symmetrically  CN11: trap full strength on shoulder shrug  CN12: tongue midline with protrusion  Strength: No prontator drift.  good strength in all 4 extremities   Deep tendon reflexes: normal in all 4 extremities  Sensory: light touch intact in all 4 extremities. No sensory extinction on double simultaneous stimulation  Cerebellar finger nose finger normal without ataxia      Imaging    Brain:  MRI brain with acute diffusion restriction in left occipital lobe. No other T2/FLAIR changes or gradient changes. Vessels:    CTA:  Left PCA occlusion distally but no atherosclerotic changes    Labs:  Homocysteine, B2-GP, anticardiolipin antibody, and Lupus anticoagulant negative  [id number], FVL, functional Prot C &S, and FVIII all normal    GARDENIA  Summary   Ejection fraction is visually estimated to be 55-60 %.   Mild mitral regurgitation. The atrial septum is aneurysmal.   A bubble study was performed and shows evidence of a small right to left   shunting consistent with a patent foramen ovale or atrial septal defect.   Unable to see any color doppler signal across the septum.     ECG:  Component Value Ref Range & Units Status Collected Lab   Ventricular Rate 75  BPM Final 03/11/2019  1:58 PM 14   Atrial Rate 75  BPM Final 03/11/2019  1:58 PM 14   P-R Interval 176  ms Final 03/11/2019  1:58 PM 14   QRS Duration 92  ms Final 03/11/2019  1:58 PM 14   Q-T Interval 370  ms Final 03/11/2019  1:58 PM 14 QTc Calculation (Bazett) 413  ms Final 03/11/2019  1:58 PM 14   P Axis 67  degrees Final 03/11/2019  1:58 PM 14   R Axis -22  degrees Final 03/11/2019  1:58 PM 14   T Axis 49  degrees Final 03/11/2019  1:58 PM 14   Diagnosis   Final 03/11/2019  1:58 PM 14   Normal sinus rhythmNormal ECGNo previous ECGs availableConfirmed by Valentino Gastelum ([id number]) on 3/13/2019 12:47:40 PM       Assessment  30 yo man with acute embolic left PCA ischemic stroke 3/11/2019. Stroke is cryptogenic in nature and given PFO he would be a good candidate for PFO closure.       Pilar Smith MD

## 2019-05-09 NOTE — H&P
Reason for Referral: CVA     CC: vision loss and headache         Subjective:      History of Present Illness:     Luis Alberto Jordan is a 29 y.o. patient with a PMH significant for significant  Medical problems came to the [facility] increasing acute headache followed by vision loss and  Decreased peripheral vision. No numbness no tingling. These symptoms started acutely without any aggravating factors. There were no relieving factors.      A CT and MRI investigation revealed new stroke with occlusion of left PCA stroke and left ischemic posterior temporal stroke.      He is feeling better now and no clotting disorders were found. He has an event monitor in place. He is only on ASA and not on plavix.      Past Medical History:   has a past medical history of Hernia, History of bone graft, and TMJ (dislocation of temporomandibular joint).     Surgical History:   has a past surgical history that includes Hand surgery; bone graft; Hand surgery; and Anterior cruciate ligament repair (Left, 9/10/14).    Social History:   reports that he has been smoking cigarettes. He has been smoking about 0.33 packs per day. He uses smokeless tobacco. He reports that he drinks about 7.2 oz of alcohol per week. He reports that he does not use drugs.      Family History:  family history includes Cancer in his maternal grandfather, maternal grandmother, paternal grandfather, and paternal grandmother; Heart Failure in his maternal grandfather; High Blood Pressure in his paternal grandmother; Other in his paternal grandfather.     Home Medications:  Were reviewed and are listed in nursing record and/or below  Home Medications           Prior to Admission medications    Medication Sig Start Date End Date Taking?  Authorizing Provider   aspirin 325 MG tablet Take 1 tablet by mouth daily 3/14/19     JAMES El CNP   atorvastatin (LIPITOR) 40 MG tablet Take 1 tablet by mouth nightly 3/13/19     JAMES El - YAMILE No adenopathy. No tenderness/mass/nodules. No carotid bruit or elevated JVD. Lungs:   Respiratory Effort: Normal   Auscultation: Clear to auscultation bilaterally, respirations unlabored. No wheeze, rales   Chest Wall:  No tenderness or deformity. Cardiovascular:     Pulses  Palpation: normal   Ascultation: Regular rate, S1/ S2 normal. No murmur, rub, or gallop. 2+ radial and pedal pulses, symmetric  Carotid  Femoral   Abdomen and Gastrointestinal:   Soft, non-tender, bowel sounds active. Liver and Spleen  Masses   Musculoskeletal: No muscle wasting  Back  Gait   Extremities: Extremities normal, atraumatic. No cyanosis or edema. No cyanosis clubbing         Skin: Inspection and palpation performed, no rashes or lesions. Pysch: Normal mood and affect.  Alert and oriented to time place person   Neurologic: Normal gross motor and sensory exam.       Labs      All labs have been reviewed           Lab Results   Component Value Date     WBC 12.1 03/11/2019     RBC 5.08 03/11/2019     HGB 15.3 03/11/2019     HCT 45.7 03/11/2019     MCV 90.0 03/11/2019     RDW 13.3 03/11/2019      03/11/2019            Lab Results   Component Value Date      03/11/2019     K 4.0 03/11/2019      03/11/2019     CO2 25 03/11/2019     BUN 10 03/11/2019     CREATININE 0.7 03/11/2019     GFRAA >60 03/11/2019     GFRAA >60 03/07/2010     AGRATIO 1.6 03/11/2019     LABGLOM >60 03/11/2019     GLUCOSE 102 03/11/2019     PROT 7.4 03/11/2019     PROT 7.0 03/07/2010     CALCIUM 9.4 03/11/2019     BILITOT 0.5 03/11/2019     ALKPHOS 50 03/11/2019     AST 16 03/11/2019     ALT 17 03/11/2019      No results found for: PTINR  No results found for: LABA1C        Lab Results   Component Value Date     TROPONINI <0.01 03/11/2019         Cardiac, Vascular and Imaging Data all Personally Reviewed in Detail by Myself       EKG: NSR     Echocardiogram: Summary   Ejection fraction is visually estimated to be 55-60 %.   Mild mitral

## 2019-05-10 VITALS
RESPIRATION RATE: 18 BRPM | DIASTOLIC BLOOD PRESSURE: 65 MMHG | OXYGEN SATURATION: 97 % | HEIGHT: 73 IN | WEIGHT: 165.34 LBS | SYSTOLIC BLOOD PRESSURE: 145 MMHG | TEMPERATURE: 97.9 F | HEART RATE: 73 BPM | BODY MASS INDEX: 21.91 KG/M2

## 2019-05-10 LAB
ANION GAP SERPL CALCULATED.3IONS-SCNC: 10 MMOL/L (ref 3–16)
BUN BLDV-MCNC: 12 MG/DL (ref 7–20)
CALCIUM SERPL-MCNC: 9.1 MG/DL (ref 8.3–10.6)
CHLORIDE BLD-SCNC: 102 MMOL/L (ref 99–110)
CO2: 28 MMOL/L (ref 21–32)
CREAT SERPL-MCNC: 0.7 MG/DL (ref 0.9–1.3)
GFR AFRICAN AMERICAN: >60
GFR NON-AFRICAN AMERICAN: >60
GLUCOSE BLD-MCNC: 73 MG/DL (ref 70–99)
POC ACT LR: 252 SEC
POC ACT LR: 319 SEC
POTASSIUM SERPL-SCNC: 4.3 MMOL/L (ref 3.5–5.1)
SODIUM BLD-SCNC: 140 MMOL/L (ref 136–145)

## 2019-05-10 PROCEDURE — 2580000003 HC RX 258: Performed by: INTERNAL MEDICINE

## 2019-05-10 PROCEDURE — 93325 DOPPLER ECHO COLOR FLOW MAPG: CPT

## 2019-05-10 PROCEDURE — 80048 BASIC METABOLIC PNL TOTAL CA: CPT

## 2019-05-10 PROCEDURE — G0378 HOSPITAL OBSERVATION PER HR: HCPCS

## 2019-05-10 PROCEDURE — 6370000000 HC RX 637 (ALT 250 FOR IP): Performed by: INTERNAL MEDICINE

## 2019-05-10 PROCEDURE — 99239 HOSP IP/OBS DSCHRG MGMT >30: CPT | Performed by: NURSE PRACTITIONER

## 2019-05-10 PROCEDURE — 36415 COLL VENOUS BLD VENIPUNCTURE: CPT

## 2019-05-10 PROCEDURE — 93308 TTE F-UP OR LMTD: CPT

## 2019-05-10 RX ORDER — CLOPIDOGREL BISULFATE 75 MG/1
75 TABLET ORAL DAILY
Qty: 90 TABLET | Refills: 1 | Status: SHIPPED | OUTPATIENT
Start: 2019-05-10 | End: 2020-04-13

## 2019-05-10 RX ADMIN — SODIUM CHLORIDE: 9 INJECTION, SOLUTION INTRAVENOUS at 05:59

## 2019-05-10 RX ADMIN — CLOPIDOGREL BISULFATE 75 MG: 75 TABLET ORAL at 08:00

## 2019-05-10 RX ADMIN — Medication 10 ML: at 08:01

## 2019-05-10 RX ADMIN — ACETAMINOPHEN 650 MG: 325 TABLET ORAL at 07:19

## 2019-05-10 RX ADMIN — ASPIRIN 81 MG 81 MG: 81 TABLET ORAL at 08:00

## 2019-05-10 ASSESSMENT — PAIN DESCRIPTION - ORIENTATION: ORIENTATION: LOWER

## 2019-05-10 ASSESSMENT — PAIN SCALES - GENERAL
PAINLEVEL_OUTOF10: 0
PAINLEVEL_OUTOF10: 0
PAINLEVEL_OUTOF10: 2
PAINLEVEL_OUTOF10: 0

## 2019-05-10 ASSESSMENT — PAIN DESCRIPTION - FREQUENCY: FREQUENCY: CONTINUOUS

## 2019-05-10 ASSESSMENT — PAIN DESCRIPTION - ONSET: ONSET: ON-GOING

## 2019-05-10 ASSESSMENT — PAIN DESCRIPTION - LOCATION: LOCATION: BACK

## 2019-05-10 ASSESSMENT — PAIN DESCRIPTION - PAIN TYPE: TYPE: ACUTE PAIN

## 2019-05-10 ASSESSMENT — PAIN DESCRIPTION - DESCRIPTORS: DESCRIPTORS: ACHING

## 2019-05-10 NOTE — PROGRESS NOTES
4 Eyes Admission Assessment     I agree as the admission nurse that 2 RN's have performed a thorough Head to Toe Skin Assessment on the patient. ALL assessment sites listed below have been assessed on admission. Areas assessed by both nurses:   [x]   Head, Face, and Ears   [x]   Shoulders, Back, and Chest  [x]   Arms, Elbows, and Hands   [x]   Coccyx, Sacrum, and Ischum  [x]   Legs, Feet, and Heels        Does the Patient have Skin Breakdown?   No         Vish Prevention initiated:  No   Wound Care Orders initiated:  No      [facility] nurse consulted for Pressure Injury (Stage 3,4, Unstageable, DTI, NWPT, and Complex wounds):  No      Nurse 1 eSignature: Electronically signed by Robert Vasquez RN on 5/9/19 at 6:25 PM    **SHARE this note so that the co-signing nurse is able to place an eSignature**    Nurse 2 eSignature: Electronically signed by Carlos A Azar RN on 5/9/19 at 10:48 PM
Echo completed waiting on reading and stat blood work to be done
Pt ambulate to BR  Pt denies shortness breath or chest pain  Right femoral puncture site without bleeding
Reviewed d/c instructions post groin care ,new med hand outs and side effect with pt per teach back method. D/c TEL. D/cSL left f/a home with significant other.
S: No angina. No sob. O:  Blood pressure 119/84, pulse 71, temperature 97.1 °F (36.2 °C), temperature source Oral, resp. rate 18, height 6' 1\" (1.854 m), weight 165 lb 5.5 oz (75 kg), SpO2 99 %. General (appearance): Well devel. No distress. Eyes: Anicteric. EOMI. Neck: Supple. No JVD. Ears/Nose/Mouth/Thorat: No cyanosis  CV: RRR. No m/r/g   Respiratory:  Clear B, normal respiratory effort  GI: Abd soft. NT. No peritoneal signs. Skin: Warm, dry. No rashes  Neuro/Psych: Alert and oriented x 3.. Appropriate behavior. CN 2-12 in tact. Motor and sensory exams normal  Ext:  No c/c. No edema  Pulses:  2+ fem. Groni soft    A/P:  29 y.o. s/p PFO closure. Plan:  -ASA and plavix x 6 mo, asa indefinitely  -Statin per Dr. Madhuri Kwan  -See us within a month. Echo in 1 mo. -F/U and see us in 6 mo with echo  -Discussed endocarditis proph    Dee CISNEROS.  Loretta Ding MD, [facility], [facility]
S: No chest pain or sob. Right groin tenderness improving  Tele; Sinus     O:  Physical Exam:  /84   Pulse 71   Temp 97.1 °F (36.2 °C) (Oral)   Resp 18   Ht 6' 1\" (1.854 m)   Wt 165 lb 5.5 oz (75 kg)   SpO2 99%   BMI 21.81 kg/m²    General (appearance):  No acute distress  Eyes: anicteric   Neck: soft, No JVD  Ears/Nose/Mouth/Thorat: No cyanosis  CV: RRR   Respiratory:  Clear  GI: soft, non-tender, non-distended  Skin: Warm, dry. No rashes  Neuro/Psych: Alert and oriented x 3. Appropriate behavior  Ext:  No c/c. No LE edema  Pulses:  2+ right femoral. Right groin soft, no hematoma. + distal pulses     CBC:   Lab Results   Component Value Date    WBC 7.0 2019    HGB 15.3 2019    HCT 45.3 2019    MCV 91.9 2019     2019     BMP:   Lab Results   Component Value Date     2019    K 4.6 2019     2019    CO2 28 2019    BUN 15 2019    CREATININE 1.0 2019    GLUCOSE 98 2019    CALCIUM 9.8 2019      PT/INR:   Lab Results   Component Value Date    INR 0.96 2019    INR 1.03 2019    PROTIME 10.9 2019    PROTIME 11.7 2019     LIPIDS:   Lab Results   Component Value Date    CHOL 140 2019     Lab Results   Component Value Date    TRIG 79 2019     Lab Results   Component Value Date    HDL 40 2019     Lab Results   Component Value Date    LDLCALC 84 2019     Lab Results   Component Value Date    LABVLDL 16 2019     No results found for: CHOLHDLRATIO    Imagin2019 ECG: Sinus     3/13/2019 GARDENIA:   Ejection fraction is visually estimated to be 55-60 %.   Mild mitral regurgitation. The atrial septum is aneurysmal.   A bubble study was performed and shows evidence of a small right to left   shunting consistent with a patent foramen ovale or atrial septal defect.   Unable to see any color doppler signal across the septum.     3/12/2019 Echo:   Normal left ventricular
regional wall motion abnormalites are seen.   Left ventricular diastolic filling pressure is normal.   Mild mitral regurgitation.   A bubble study was performed and shows evidence of late right to left   shunting consistent with a intra-pulmonary shunt.   Systolic pulmonic artery pressure (SPAP) is normal estimated at 19 mmHg   (Right atrial pressure of 3 mmHg). 3/13/2019 BLE venous duplex:     Eleni Kocher is no evidence of deep or superficial venous thrombosis involving the    lower extremities bilaterally. 3/11/2019 MRI brain  Focal area of increased T2, FLAIR and diffusion signal in the left   posterior/medial temporal occipital region.  This is likely due to a recent   infarct. 3/11/2019 CTA head/neck  Left posterior cerebral artery, distal P2 segment occlusion is suspected. 3/11/2019 CT head:     Focal area of hypodensity in the medial left occipital lobe, suggesting   recent infarct.  No obvious mass seen in this region on noncontrast study.        Assessment:  29 y.o. s/p PFO closure with 25 mm PFO occluder device  Issues  -PFO  -hx CVA    Plan:  Ancef 1 gram q 8 hours x2  Limited Echo in am  BMP in am  ASA, statin, plavix

## 2019-05-10 NOTE — TELEPHONE ENCOUNTER
Spoke with Arvin Pagan in regards to which company [facility] uses for their monitors. We spoke with an MA at their office who was able to retrive report and scan into media. I printed report and given to Milo Haddad RN.

## 2019-05-10 NOTE — PLAN OF CARE
Problem: Cardiac:  Goal: Ability to maintain vital signs within normal range will improve  Description  Ability to maintain vital signs within normal range will improve  5/10/2019 0858 by Jenni Nichols, RN  Outcome: Completed  Note:   Vss SR on tel   5/10/2019 0043 by Sonya Ramos RN  Outcome: Ongoing  Goal: Cardiovascular alteration will improve  Description  Cardiovascular alteration will improve  Outcome: Completed  Goal: Complications related to the disease process, condition or treatment will be avoided or minimized  Description  Complications related to the disease process, condition or treatment will be avoided or minimized  Outcome: Completed  Goal: Ability to maintain an adequate cardiac output will improve  Description  Ability to maintain an adequate cardiac output will improve  Outcome: Completed  Goal: Hemodynamic stability will improve  Description  Hemodynamic stability will improve  Outcome: Completed  Goal: Risk factors for ineffective tissue perfusion will decrease  Description  Risk factors for ineffective tissue perfusion will decrease  Outcome: Completed  Note:   Rt.  Groin stable  +/+ PP 2+/2+ warm pink toes     Problem: Health Behavior:  Goal: Will modify at least one risk factor affecting health status  Description  Will modify at least one risk factor affecting health status  Outcome: Completed  Goal: Identification of resources available to assist in meeting health care needs will improve  Description  Identification of resources available to assist in meeting health care needs will improve  Outcome: Completed     Problem: Physical Regulation:  Goal: Complications related to the disease process, condition or treatment will be avoided or minimized  Description  Complications related to the disease process, condition or treatment will be avoided or minimized  Outcome: Completed     Problem: Discharge Planning:  Goal: Discharged to appropriate level of care  Description  Discharged to appropriate level of care  Outcome: Completed

## 2019-05-10 NOTE — DISCHARGE SUMMARY
[facility] Discharge Note      Patient ID: Tessa Prado 29 y.o. 1985    Admission Date: 5/9/2019     Discharge Date:  5/10/2019    Reason for Admission:  Principal Diagnosis: Patent foramen ovale   Secondary Diagnosis: Cerebrovascular accident     Procedures:  PFO closure    Brief Summary of Patient's Course:  29 y.o. patient of Dr Annalee Thomas with PFO and hx CVA who presented for PFO closure. Under ICE and fluoroscopic guidance, the 25 mm PFO occluder device was deployed into the septum. Pt tolerated the procedure well. He was monitored overnight. Placement was verified by echo the next day. The patient was discharged home the next day. Discharge Condition:  Good     Discharge Instructions: Activity Limitations:  No heavy lifting. Diet:  low fat and low sodium    Follow Up Instructions: To office in: Follow up with Dr Annalee Thomas in 2 weeks    Instructed Re: Discharge medications, activity and dietary restrictions and follow up appointments were discussed. Pt will need antibiotic prophylaxis for 1 year. Follow up echo in 1 month, 6 months and 1 year. Pt will take asa indefinitely and Plavix for at least 6 months.      Discussed the importance of dual platelet therapy    Discharge Medications:   [facility], [address] [facility] Medication Instructions CQB:[id number]    Printed on:05/10/19 7792   Medication Information                      aspirin 81 MG tablet  Take 1 tablet by mouth daily             atorvastatin (LIPITOR) 40 MG tablet  Take 1 tablet by mouth nightly             clopidogrel (PLAVIX) 75 MG tablet  Take 1 tablet by mouth daily                                    Attending Physician:  Dr. James Grace   Time Spent on Discharge: greater than 30 minutes

## 2019-05-17 ENCOUNTER — TELEPHONE (OUTPATIENT)
Dept: CARDIOLOGY CLINIC | Age: 34
End: 2019-05-17

## 2019-05-17 NOTE — TELEPHONE ENCOUNTER
Cendi-PFO  reports that patient needs a 1 month (+ 7) follow up. Not the 2 week f/u that we scheduled post op. I have emailed her back asking if he can see a NP as Dr. Lorna Hernandes is unavailable during that time frame. Currently schedule Thurs. 5/23/19 Dr. Roel Johnson     Will await her answer.

## 2019-05-20 NOTE — TELEPHONE ENCOUNTER
See my message below, just an FYI:   I am canceling this Thursday's f/u appt and moving it with YAMILE Morgan at the 30 day (+7) as per Cendi's-Coordinator's instructions and okayed him to f/u with NP. Thanks.

## 2019-05-21 NOTE — TELEPHONE ENCOUNTER
[facility] Bar Guerra CNP or Friday 6/21/19 with Dr. Daxa Noriega.

## 2019-05-22 NOTE — TELEPHONE ENCOUNTER
Jarod Lyons called the office to confirm his appt for tomorrow and after hanging up I saw this encounter and called Jarod Lyons back and told to call our office any time from 8am  to 5 pm that he needed to r/s appt with  to a 30 day f/u

## 2019-05-22 NOTE — TELEPHONE ENCOUNTER
Left Marizol Connors a message to return call to the office to reschedule his follow up appointment after his PFO closure.

## 2019-06-10 ENCOUNTER — OFFICE VISIT (OUTPATIENT)
Dept: CARDIOLOGY CLINIC | Age: 34
End: 2019-06-10
Payer: COMMERCIAL

## 2019-06-10 VITALS
HEIGHT: 73 IN | WEIGHT: 166 LBS | SYSTOLIC BLOOD PRESSURE: 122 MMHG | OXYGEN SATURATION: 98 % | HEART RATE: 95 BPM | BODY MASS INDEX: 22 KG/M2 | DIASTOLIC BLOOD PRESSURE: 64 MMHG

## 2019-06-10 DIAGNOSIS — Q21.12 PFO (PATENT FORAMEN OVALE): Primary | ICD-10-CM

## 2019-06-10 DIAGNOSIS — I63.332 CEREBROVASCULAR ACCIDENT (CVA) DUE TO THROMBOSIS OF LEFT POSTERIOR CEREBRAL ARTERY (HCC): ICD-10-CM

## 2019-06-10 DIAGNOSIS — M79.642 LEFT HAND PAIN: ICD-10-CM

## 2019-06-10 PROCEDURE — 99214 OFFICE O/P EST MOD 30 MIN: CPT | Performed by: NURSE PRACTITIONER

## 2019-06-10 NOTE — LETTER
[facility] HEART [address]. [place] [address]  Phone: [phone]  Fax: [phone]    JAMES Huynh CNP        Serena 10, 2019     Freedom Still, [address]    Patient: Beth Cornelius  MR Number: [id number]  YOB: 1985  Date of Visit: 6/10/2019    Dear Dr. Freedom Still: Thank you for the request for consultation for [facility]. If you have questions, please do not hesitate to call me. I look forward to following Elan Guzman along with you.     Sincerely,        JAMES Huynh CNP

## 2019-06-10 NOTE — PROGRESS NOTES
[facility]  Office Visit    Robert 26  1985    Serena 10, 2019    CC:   Chief Complaint   Patient presents with    Follow-Up from Hospital     s/p PFO    Other     left hand pain after procedure     HPI:  The patient is 29 y.o. male with a past medical history significant for CVA who is here for follow up post PFO/ASD closure. He had a CVA in 3/2019 d/t occluded L PCA and suffered ischemic stroke. GARDENIA was performed and showed small R to L shunt with PFO or ASD. On 5/9/2019 he underwent closure of PFO with 25 mm PFO occluder. He is currently on ASA and plavix. Here for follow up post procedure. Approximately 1 week after procedure he noticed onset of L hand/wrist pain, worse with movement or lifting things; denies numbness/tingling. Has had bone graft in the past. Tylenol with some relief. He does not recall any injury. No sxs similar to his presenting sxs with his CVA earlier this year. He still has issues with his peripheral vision. Works on [employer]. Denies chest pain/discomfort, SOB, orthopnea/PND, cough, palpitations, dizziness, syncope, edema , weight change or claudication. Review of Systems:  Constitutional: Denies  fatigue, weakness, night sweats or fever. HEENT: Denies ringing in ears, nosebleeds,nasal congestion. + persistent loss of peripheral vision. Respiratory: Denies new or change in SOB, PND, orthopnea or cough. Cardiovascular: see HPI  GI: Denies N/V, diarrhea, constipation, abdominal pain, change in bowel habits, melena or hematochezia  : Denies urinary frequency, urgency, incontinence, hematuria or dysuria. Skin: Denies rash, hives, or cyanosis  Musculoskeletal: + L hand pain  Neurological: Denies syncope or TIA-like symptoms.   Psychiatric: Denies anxiety, insomnia or depression     Past Medical History:   Diagnosis Date    CVA (cerebral vascular accident) ([address] 2019    Hernia     History of bone graft     PFO (patent foramen ovale) Cardiovascular: RRR, normal S1 and S2; no murmur/gallop or rub  Pulmonary/Chest: Effort normal.  Lungs clear to auscultation. Chest wall nontender  Abdominal: soft, nontender, nondistended. + bowel sounds; no hepatomegaly   Extremities: No edema, cyanosis, or clubbing. Pulses are 2+ radial/DP/PT bilaterally. Cap refill brisk. L hand/thumb tender to palpation with some swelling noted. + sensation to all digits. + full ROM (but with c/o pain with flexion)  Neurological: No focal deficit. Skin: Skin is warm and dry. Psychiatric: He has a normal mood and affect. His speech is normal and behavior is normal.     Lab Review:   Lab Results   Component Value Date    TRIG 79 03/12/2019    HDL 40 03/12/2019    LDLCALC 84 03/12/2019    LABVLDL 16 03/12/2019     Lab Results   Component Value Date     05/10/2019    K 4.3 05/10/2019     05/10/2019    CO2 28 05/10/2019    BUN 12 05/10/2019    CREATININE 0.7 05/10/2019    GLUCOSE 73 05/10/2019    CALCIUM 9.1 05/10/2019      Lab Results   Component Value Date    WBC 7.0 05/03/2019    HGB 15.3 05/03/2019    HCT 45.3 05/03/2019    MCV 91.9 05/03/2019     05/03/2019     Limited echo 5/10/2019:  Normal left ventricle size, wall thickness, and systolic function with an estimated ejection fraction of 55-60%. No regional wall motion abnormalities  are seen. A 25 mm PFO occluder device was seen and appears well seated between the  left and right atria. No shunt by color flow. 5/9/2019: PFO closure:  25 mm PFO occluder device was inserted   Successful closure of patent foramen ovale     GARDENIA 3/15/2019:  Ejection fraction is visually estimated to be 55-60 %. Mild mitral regurgitation. The atrial septum is aneurysmal.  A bubble study was performed and shows evidence of a small right to left  shunting consistent with a patent foramen ovale or atrial septal defect. Unable to see any color doppler signal across the septum.     Echo 3/12/2019:  Normal left ventricular systolic function with ejection fraction of 55-60%. No regional wall motion abnormalites are seen. Left ventricular diastolic filling pressure is normal.  Mild mitral regurgitation.   A bubble study was performed and shows evidence of late right to left shunting consistent with a intra-pulmonary shunt. Systolic pulmonic artery pressure (SPAP) is normal estimated at 19 mmHg  (Right atrial pressure of 3 mmHg). 3/11/2019: CT head  Left posterior cerebral artery, distal P2 segment occlusion is suspected. Assessment:    1. PFO (patent foramen ovale)  -S/P PFO closure on 5/9/2019  -continue ASA and plavix x 6 months and then ASA indefinitely  -check echo now (1 month out) and in 6 months and 1 year    2. Cerebrovascular accident (CVA) due to thrombosis of left posterior cerebral artery ([address]  -in 3/2019  -continues with peripheral vision loss    3. Left hand pain  -not related to recent procedure  -appears to be musculoskeletal in nature: worse with movement; denies paresthesias  -will ask for hand xray  -has had bone graft in the past to L hand/wrist    Plan:  Continue ASA, plavix and atorvastatin  Check echo to evaluate PFO closure device  Check xray L hand   Discussed low fat/low sodium diet and reinforced regular aerobic exercise. Follow up with Dr. Shana Conteh in 6 months or sooner if needed    Return in about 6 months (around 12/10/2019) for with Dr. Shana Conteh. Thanks for allowing me to participate in the care of this patient.       Coni Flood, APRN-CNP  [address], [address] [place] [address]) [place], [address] [place] [address] Frankie Lakhani [facility]  Office: [phone]  Fax: [phone]

## 2019-06-16 ENCOUNTER — HOSPITAL ENCOUNTER (EMERGENCY)
Age: 34
Discharge: HOME | End: 2019-06-16
Payer: COMMERCIAL

## 2019-06-16 ENCOUNTER — APPOINTMENT (OUTPATIENT)
Dept: GENERAL RADIOLOGY | Age: 34
End: 2019-06-16
Payer: COMMERCIAL

## 2019-06-16 VITALS
DIASTOLIC BLOOD PRESSURE: 82 MMHG | OXYGEN SATURATION: 98 % | HEART RATE: 72 BPM | TEMPERATURE: 98.4 F | SYSTOLIC BLOOD PRESSURE: 124 MMHG | RESPIRATION RATE: 17 BRPM

## 2019-06-16 DIAGNOSIS — S62.91XA CLOSED FRACTURE OF RIGHT HAND, INITIAL ENCOUNTER: Primary | ICD-10-CM

## 2019-06-16 PROCEDURE — 4500000023 HC ED LEVEL 3 PROCEDURE

## 2019-06-16 PROCEDURE — 99283 EMERGENCY DEPT VISIT LOW MDM: CPT

## 2019-06-16 PROCEDURE — 73130 X-RAY EXAM OF HAND: CPT

## 2019-06-16 RX ORDER — HYDROCODONE BITARTRATE AND ACETAMINOPHEN 5; 325 MG/1; MG/1
1 TABLET ORAL EVERY 8 HOURS PRN
Qty: 10 TABLET | Refills: 0 | Status: SHIPPED | OUTPATIENT
Start: 2019-06-16 | End: 2019-06-19

## 2019-06-16 ASSESSMENT — PAIN DESCRIPTION - LOCATION: LOCATION: HAND

## 2019-06-16 ASSESSMENT — PAIN DESCRIPTION - PAIN TYPE: TYPE: ACUTE PAIN

## 2019-06-16 ASSESSMENT — PAIN DESCRIPTION - ORIENTATION: ORIENTATION: RIGHT

## 2019-06-16 ASSESSMENT — PAIN SCALES - GENERAL: PAINLEVEL_OUTOF10: 3

## 2019-06-16 NOTE — ED PROVIDER NOTES
[facility] Emergency Department    CHIEF COMPLAINT  Hand Injury (hand through wall this morning. c/o pain, swelling)      HISTORY OF PRESENT ILLNESS  Tashia Monae is a 29 y.o. male who presents to the ED complaining of several hour history of right hand pain. Patient observed lying in bed, appears nontoxic and in no acute distress at this time. Drove himself in today for evaluation. Patient right-hand-dominant. States that he got upset and punched a wall. States that he felt immediate pain and fairly sudden onset hand swelling. Reports pain with movement and touch. Occasional tingling. No numbness. No weakness. No prior history of injury or trauma to the extremity. No other complaints, modifying factors or associated symptoms. Nursing notes reviewed.    Past Medical History:   Diagnosis Date    [facility]A [facility]cerebral [facility]) [facility] 2019    Hernia     History of bone graft     PFO (patent foramen ovale) 05/09/2019    PFO closure with 25 mm PFO occluder device    TMJ (dislocation of temporomandibular joint)      Past Surgical History:   Procedure Laterality Date    ANTERIOR CRUCIATE LIGAMENT REPAIR Left 9/10/14    BONE GRAFT      left wrist    HAND SURGERY      HAND SURGERY      right thumb pins     Family History   Problem Relation Age of Onset    Cancer Maternal Grandmother     Cancer Maternal Grandfather     Heart Failure Maternal Grandfather     Cancer Paternal Grandmother     High Blood Pressure Paternal Grandmother         MI    Cancer Paternal Grandfather     Other Paternal Grandfather      Social History     Socioeconomic History    Marital status: Single     Spouse name: Not on file    Number of children: Not on file    Years of education: Not on file    Highest education level: Not on file   Occupational History    Not on file   Social Needs    Financial resource strain: Not on file    Food insecurity:     Worry: Not on file     Inability: Not on file    Transportation needs:     Medical: Not on file     Non-medical: Not on file   Tobacco Use    Smoking status: Former Smoker     Packs/day: 0.33     Types: Cigarettes    Smokeless tobacco: Former User    Tobacco comment: quit 3/2019   Substance and Sexual Activity    Alcohol use: Yes     Alcohol/week: 7.2 oz     Types: 12 Cans of beer per week     Comment: 5 drinks per week    Drug use: No     Types: Marijuana     Comment: last used 2012    Sexual activity: Yes     Partners: Female   Lifestyle    Physical activity:     Days per week: Not on file     Minutes per session: Not on file    Stress: Not on file   Relationships    Social connections:     Talks on phone: Not on file     Gets together: Not on file     Attends Orthodox service: Not on file     Active member of club or organization: Not on file     Attends meetings of clubs or organizations: Not on file     Relationship status: Not on file    Intimate partner violence:     Fear of current or ex partner: Not on file     Emotionally abused: Not on file     Physically abused: Not on file     Forced sexual activity: Not on file   Other Topics Concern    Not on file   Social History Narrative    ** Merged History Encounter **          No current facility-administered medications for this encounter. Current Outpatient Medications   Medication Sig Dispense Refill    HYDROcodone-acetaminophen (NORCO) 5-325 MG per tablet Take 1 tablet by mouth every 8 hours as needed for Pain for up to 3 days.  10 tablet 0    aspirin 81 MG tablet Take 1 tablet by mouth daily 90 tablet 3    clopidogrel (PLAVIX) 75 MG tablet Take 1 tablet by mouth daily 90 tablet 1    atorvastatin (LIPITOR) 40 MG tablet Take 1 tablet by mouth nightly 30 tablet 3     No Known Allergies    REVIEW OF SYSTEMS  6 systems reviewed, pertinent positives per HPI otherwise noted to be negative    PHYSICAL EXAM  /82   Pulse 72   Temp 98.4 °F (36.9 °C) (Oral)   Resp 17 SpO2 98%   GENERAL APPEARANCE: Awake and alert. Cooperative. No acute distress. HEAD: Normocephalic. Atraumatic. EYES: PERRL. EOM's grossly intact. ENT: Mucous membranes are moist.   NECK: Supple. Normal ROM. CHEST: Equal symmetric chest rise. LUNGS: Breathing is unlabored. Speaking comfortably in full sentences. Abdomen: Nondistended  EXTREMITIES: MAEE. on exam of the right hand patient has soft tissue swelling dorsally with contusion in place. Tender at the base of the metacarpals. No obvious deformities or step-off. No evidence of dislocations or subluxation. Normal flexion and extension of digit against resistance. No loss of thumb-finger opposition. Radial pulses +2 and cap refill less than 5 seconds. There is no snuffbox tenderness. SKIN: Warm and dry. NEUROLOGICAL: Alert and oriented. Strength is 5/5 in all extremities and sensation is intact. RADIOLOGY  Xr Hand Right (min 3 Views)    Result Date: 6/16/2019  EXAMINATION: 3 XRAY VIEWS OF THE RIGHT HAND 6/16/2019 7:20 pm COMPARISON: None. HISTORY: ORDERING SYSTEM PROVIDED HISTORY: injury TECHNOLOGIST PROVIDED HISTORY: Reason for exam:->injury Ordering Physician Provided Reason for Exam: hand through wall; swelling Acuity: Acute Type of Exam: Initial FINDINGS: Acute nondisplaced fractures of the proximal base of the 3rd and 4th metacarpals. Joint spaces are maintained. Dorsal soft tissue swelling is present. Acute nondisplaced fractures of the proximal 3rd and 4th metacarpals. ED COURSE   I have independently evaluated this patient. Pain control was not required while here in the emergency department. Triage vitals stable. X-ray imaging consistent with nondisplaced fractures of third and fourth metacarpals. A volar OCL splint was applied to the right hand by nursing staff. Patient remained neurovascular intact status post application.   Will be discharged home with referral for orthopedist for further evaluation and more definitive care. Of also discussed return precautions and patient is in agreement and comfortable discharge. A discussion was had with . Phyllis Badillo regarding hand injury, ED findings and recommendations for follow-up. All questions were answered. Patient will follow up with orthopedist within 1 week for further evaluation/treatment. Patient will return to ED for new/worsening symptoms. Patient was sent home with a prescription for Norco.    MDM  I estimate there is LOW risk for COMPARTMENT SYNDROME, DEEP VENOUS THROMBOSIS, SEPTIC ARTHRITIS, TENDON OR NEUROVASCULAR INJURY, thus I consider the discharge disposition reasonable. Matty Shea and I have discussed the diagnosis and risks, and we agree with discharging home to follow-up with their primary doctor or the referral orthopedist. We also discussed returning to the Emergency Department immediately if new or worsening symptoms occur. We have discussed the symptoms which are most concerning (e.g., changing or worsening pain, numbness, weakness) that necessitate immediate return. Final Impression  1. Closed fracture of right hand, initial encounter      Blood pressure 124/82, pulse 72, temperature 98.4 °F (36.9 °C), temperature source Oral, resp. rate 17, SpO2 98 %. DISPOSITION  Patient was discharged to home in good condition.          Laith Servin  06/19/19 1007

## 2019-06-17 NOTE — ED NOTES
Applied a 13in x 5in fiberglass volar short arm splint on pt's right arm and wrapped with 2 4in ace wraps. Pt tolerated well and CNS intact before and after application. Pt instructed on care and replied back with understanding.      July Patel  06/16/19 2035

## 2019-06-17 NOTE — ED NOTES
Discharge instructions reviewed with Pt. Pt verbalizes understanding at this time. Prescriptions/medications reviewed with pt at this time. Pt condition stable at this time. No concerns voiced.       Pallavi Zheng RN  06/16/19 2029

## 2019-06-19 ENCOUNTER — HOSPITAL ENCOUNTER (OUTPATIENT)
Dept: GENERAL RADIOLOGY | Age: 34
Discharge: HOME | End: 2019-06-19
Payer: COMMERCIAL

## 2019-06-19 ENCOUNTER — HOSPITAL ENCOUNTER (OUTPATIENT)
Dept: CARDIOLOGY | Age: 34
Discharge: HOME | End: 2019-06-19
Payer: COMMERCIAL

## 2019-06-19 ENCOUNTER — HOSPITAL ENCOUNTER (OUTPATIENT)
Age: 34
Discharge: HOME | End: 2019-06-19
Payer: COMMERCIAL

## 2019-06-19 DIAGNOSIS — M79.642 LEFT HAND PAIN: ICD-10-CM

## 2019-06-19 DIAGNOSIS — Q21.12 PFO (PATENT FORAMEN OVALE): ICD-10-CM

## 2019-06-19 LAB
LV EF: 55 %
LVEF MODALITY: NORMAL

## 2019-06-19 PROCEDURE — 73120 X-RAY EXAM OF HAND: CPT

## 2019-06-19 PROCEDURE — 93306 TTE W/DOPPLER COMPLETE: CPT

## 2019-06-27 ENCOUNTER — OFFICE VISIT (OUTPATIENT)
Dept: ORTHOPEDIC SURGERY | Age: 34
End: 2019-06-27
Payer: COMMERCIAL

## 2019-06-27 VITALS — RESPIRATION RATE: 16 BRPM | BODY MASS INDEX: 22 KG/M2 | HEIGHT: 73 IN | WEIGHT: 166.01 LBS

## 2019-06-27 DIAGNOSIS — S62.314A CLOSED DISPLACED FRACTURE OF BASE OF FOURTH METACARPAL BONE OF RIGHT HAND, INITIAL ENCOUNTER: ICD-10-CM

## 2019-06-27 DIAGNOSIS — M79.641 RIGHT HAND PAIN: Primary | ICD-10-CM

## 2019-06-27 DIAGNOSIS — S62.141A CLOSED DISPLACED FRACTURE OF BODY OF HAMATE OF RIGHT WRIST, INITIAL ENCOUNTER: ICD-10-CM

## 2019-06-27 PROCEDURE — L3908 WHO COCK-UP NONMOLDE PRE OTS: HCPCS | Performed by: ORTHOPAEDIC SURGERY

## 2019-06-27 PROCEDURE — 99203 OFFICE O/P NEW LOW 30 MIN: CPT | Performed by: ORTHOPAEDIC SURGERY

## 2019-06-27 NOTE — PROGRESS NOTES
is no gross instability over the hand or wrist    Skin: There are no additional worrisome rashes, ulcerations or lesions. Gait: normal    Circulation: well perfused    Additional Comments:     Additional Examinations:  Contralateral Exam: Burn lesions over the left thumb index and long fingers without active infection       Radiology:     X-rays obtained and reviewed in office:  Views 3  Location right hand  Impression x-rays demonstrate a slightly displaced set of fractures along the base of the ring finger metacarpal and body of the hamate with slight imperfection over the [facility] region to the ring and small fingers      Assessment: Displaced and subacute right ring finger metacarpal base articular fracture, hamate fracture, and CMC fracture subluxations    Impression:   Encounter Diagnoses   Name Primary?  Right hand pain Yes    Closed displaced fracture of base of fourth metacarpal bone of right hand, initial encounter     Closed displaced fracture of body of hamate of right wrist, initial encounter        Office Procedures:  Orders Placed This Encounter   Procedures    XR HAND RIGHT (MIN 3 VIEWS)     Standing Status:   Future     Number of Occurrences:   1     Standing Expiration Date:   6/27/2020    Keturah Sibley Titan Wrist Short Brace     Patient was prescribed a Keturah Sibley Titan Wrist Orthosis. The right wrist will require stabilization / immobilization from this semi-rigid / rigid orthosis to improve their function. The orthosis will assist in protecting the affected area, provide functional support and facilitate healing. The patient was educated and fit by a healthcare professional with expert knowledge and specialization in brace application while under the direct supervision of the treating physician. Verbal and written instructions for the use of and application of this item were provided.    They were instructed to contact the office immediately should the brace result in increased pain, decreased sensation, increased swelling or worsening of the condition. Treatment Plan: I talk with the patient about the set of injuries. Ultimately he needs to understand that he does not have a truly 100% anatomic alignment at his ring and small finger CMC joints. However, he expresses a very strong desire to avoid any surgical intervention. He does understand that even with good strong healing he may always have some irregularity at the site of the hand with some mild rounding or increased chance of arthritis. I think the patient has good insight and understands the overall situation so we will go ahead and supply him with a removable right wrist brace with appropriate counseling on protection of the healing. I would like to see him back regardless over the next 10 to 14 days to make certain that this fracture remains in an overall reasonable position and would not then otherwise require unequivocal surgical reconstruction. Please note that this transcription was created using voice recognition software. Any errors are unintentional and may be due to voice recognition transcription.

## 2019-07-08 ENCOUNTER — OFFICE VISIT (OUTPATIENT)
Dept: ORTHOPEDIC SURGERY | Age: 34
End: 2019-07-08
Payer: COMMERCIAL

## 2019-07-08 VITALS — RESPIRATION RATE: 16 BRPM | WEIGHT: 166.01 LBS | BODY MASS INDEX: 22 KG/M2 | HEIGHT: 73 IN

## 2019-07-08 DIAGNOSIS — S62.141D CLOSED DISPLACED FRACTURE OF BODY OF HAMATE OF RIGHT WRIST WITH ROUTINE HEALING, SUBSEQUENT ENCOUNTER: ICD-10-CM

## 2019-07-08 DIAGNOSIS — S62.314D CLOSED DISPLACED FRACTURE OF BASE OF FOURTH METACARPAL BONE OF RIGHT HAND WITH ROUTINE HEALING, SUBSEQUENT ENCOUNTER: ICD-10-CM

## 2019-07-08 DIAGNOSIS — M79.641 RIGHT HAND PAIN: Primary | ICD-10-CM

## 2019-07-08 PROCEDURE — 99213 OFFICE O/P EST LOW 20 MIN: CPT | Performed by: ORTHOPAEDIC SURGERY

## 2019-07-08 NOTE — PROGRESS NOTES
Chief Complaint:  Hand Pain (CK RT HAND/WRIST FX DOI: 6/15/19, NEW XR TODAY)      History of Present of Illness: Patient returns for close follow-up of his subacute injury to the right ring and small CMC's. He feels much better with use of the brace and has been coming out of it occasionally. Review of Systems  Pertinent items are noted in HPI  Denies fever, chills, confusion, bowel/bladder active change. Review of systems reviewed from Patient History Form dated on 6/27/2019 and available in the patient's chart under the Media tab. Examination:  On exam today he has some moderate fullness over the ring and small fingers at the [facility] but normal alignment of the fingertips, normal range of motion with cascade of the digits, and good preserved strength. There is modest tenderness if I palpate along the base of the ring finger metacarpal.    Radiology:     X-rays obtained and reviewed in office:  Views 3  Location right hand  Impression x-rays demonstrate no further loss of alignment and the slightly displaced fracture at the hamate and base of ring finger metacarpal.      Orders Placed This Encounter   Procedures    XR HAND RIGHT (MIN 3 VIEWS)     Standing Status:   Future     Number of Occurrences:   1     Standing Expiration Date:   7/8/2020       Impression:  Encounter Diagnoses   Name Primary?  Right hand pain Yes    Closed displaced fracture of body of hamate of right wrist with routine healing, subsequent encounter     Closed displaced fracture of base of fourth metacarpal bone of right hand with routine healing, subsequent encounter          Treatment Plan:  I believe the patient will go on to have a good strong dependable hand but he understands he may always have some fullness along the CMC levels. I counseled him about use of his brace and a guided fashion starting to transition away from the brace over the next 4 weeks in comfortable situations.   I will see him back on an as-needed basis moving forward. Please note that this transcription was created using voice recognition software. Any errors are unintentional and may be due to voice recognition transcription.

## 2019-09-19 ENCOUNTER — TELEPHONE (OUTPATIENT)
Dept: CARDIOLOGY CLINIC | Age: 34
End: 2019-09-19

## 2019-10-24 ENCOUNTER — TELEPHONE (OUTPATIENT)
Dept: CARDIOLOGY CLINIC | Age: 34
End: 2019-10-24

## 2019-11-06 RX ORDER — ATORVASTATIN CALCIUM 40 MG/1
40 TABLET, FILM COATED ORAL NIGHTLY
Qty: 90 TABLET | Refills: 3 | Status: ON HOLD | OUTPATIENT
Start: 2019-11-06 | End: 2021-08-28 | Stop reason: HOSPADM

## 2019-12-09 ENCOUNTER — OFFICE VISIT (OUTPATIENT)
Dept: CARDIOLOGY CLINIC | Age: 34
End: 2019-12-09
Payer: COMMERCIAL

## 2019-12-09 VITALS
OXYGEN SATURATION: 94 % | SYSTOLIC BLOOD PRESSURE: 100 MMHG | WEIGHT: 180 LBS | HEIGHT: 73 IN | DIASTOLIC BLOOD PRESSURE: 78 MMHG | BODY MASS INDEX: 23.86 KG/M2

## 2019-12-09 DIAGNOSIS — I63.332 CEREBROVASCULAR ACCIDENT (CVA) DUE TO THROMBOSIS OF LEFT POSTERIOR CEREBRAL ARTERY (HCC): ICD-10-CM

## 2019-12-09 DIAGNOSIS — R06.09 DOE (DYSPNEA ON EXERTION): ICD-10-CM

## 2019-12-09 DIAGNOSIS — R07.9 CHEST PAIN, UNSPECIFIED TYPE: ICD-10-CM

## 2019-12-09 DIAGNOSIS — Z87.74 S/P PATENT FORAMEN OVALE CLOSURE: ICD-10-CM

## 2019-12-09 DIAGNOSIS — Q21.12 PFO (PATENT FORAMEN OVALE): Primary | ICD-10-CM

## 2019-12-09 PROCEDURE — 99214 OFFICE O/P EST MOD 30 MIN: CPT | Performed by: INTERNAL MEDICINE

## 2019-12-27 ENCOUNTER — HOSPITAL ENCOUNTER (OUTPATIENT)
Dept: CARDIOLOGY | Age: 34
Discharge: HOME | End: 2019-12-27
Payer: COMMERCIAL

## 2019-12-27 DIAGNOSIS — I63.332 CEREBROVASCULAR ACCIDENT (CVA) DUE TO THROMBOSIS OF LEFT POSTERIOR CEREBRAL ARTERY (HCC): ICD-10-CM

## 2019-12-27 DIAGNOSIS — Z87.74 S/P PATENT FORAMEN OVALE CLOSURE: ICD-10-CM

## 2019-12-27 DIAGNOSIS — Q21.12 PFO (PATENT FORAMEN OVALE): ICD-10-CM

## 2019-12-27 PROCEDURE — 93017 CV STRESS TEST TRACING ONLY: CPT

## 2020-04-14 RX ORDER — CLOPIDOGREL BISULFATE 75 MG/1
TABLET ORAL
Qty: 90 TABLET | Refills: 3 | Status: SHIPPED | OUTPATIENT
Start: 2020-04-14 | End: 2020-06-16 | Stop reason: ALTCHOICE

## 2020-05-04 ENCOUNTER — TELEPHONE (OUTPATIENT)
Dept: CARDIOLOGY CLINIC | Age: 35
End: 2020-05-04

## 2020-06-02 ENCOUNTER — HOSPITAL ENCOUNTER (OUTPATIENT)
Dept: NON INVASIVE DIAGNOSTICS | Age: 35
Discharge: HOME | End: 2020-06-02
Payer: COMMERCIAL

## 2020-06-02 LAB
LV EF: 55 %
LVEF MODALITY: NORMAL

## 2020-06-02 PROCEDURE — 93306 TTE W/DOPPLER COMPLETE: CPT

## 2020-06-15 NOTE — PROGRESS NOTES
swollen lymph nodes. · Allergic/Immunologic: No nasal congestion or hives. Objective:     PHYSICAL EXAM:      Vitals:    06/16/20 1334   BP: 110/76   Site: Left Upper Arm   Position: Sitting   Cuff Size: Medium Adult   Pulse: 70   Temp: 98.2 °F (36.8 °C)   SpO2: 99%   Weight: 176 lb 12.8 oz (80.2 kg)   Height: 6' 1\" (1.854 m)      Weight: 176 lb 12.8 oz (80.2 kg)(with shoes)       General Appearance:  Alert, cooperative, no distress, appears stated age. Head:  Normocephalic, without obvious abnormality, atraumatic. Eyes:  Pupils equal and round. No scleral icterus. Mouth: Moist mucosa, no pharyngeal erythema. Nose: Nares normal. No drainage or sinus tenderness. Neck: Supple, symmetrical, trachea midline. No adenopathy. No tenderness/mass/nodules. No carotid bruit or elevated JVD. Lungs:   Respiratory Effort: Normal   Auscultation: Clear to auscultation bilaterally, respirations unlabored. No wheeze, rales   Chest Wall:  No tenderness or deformity. Cardiovascular:    Pulses  Palpation: normal   Ascultation: Regular rate, S1/ S2 normal. No murmur, rub, or gallop. 2+ radial and pedal pulses, symmetric  Carotid  Femoral   Abdomen and Gastrointestinal:   Soft, non-tender, bowel sounds active. Liver and Spleen  Masses   Musculoskeletal: No muscle wasting  Back  Gait   Extremities: Extremities normal, atraumatic. No cyanosis or edema. No cyanosis clubbing       Skin: Inspection and palpation performed, no rashes or lesions. Pysch: Normal mood and affect.  Alert and oriented to time place person   Neurologic: Normal gross motor and sensory exam.       Labs     All labs have been reviewed    Lab Results   Component Value Date    WBC 7.0 05/03/2019    RBC 4.94 05/03/2019    HGB 15.3 05/03/2019    HCT 45.3 05/03/2019    MCV 91.9 05/03/2019    RDW 13.6 05/03/2019     05/03/2019     Lab Results   Component Value Date     05/10/2019    K 4.3 05/10/2019     05/10/2019    CO2 28 05/10/2019 Thank you for allowing us to participate in the care of Jake Reynolds. Please do not hesitate to contact me if you have any questions. Marty Michael MD, MPH    [facility], [facility]   [place], [address]  Ph: [phone]  Fax: [phone]    This note was scribed in the presence of Dr Maritza Ellis, by Edgar Nicole RN  Physician Attestation:  The scribes documentation has been prepared under my direction and personally reviewed by me in its entirety. I confirm that the note above accurately reflects all work, treatment, procedures, and medical decision making performed by me.

## 2020-06-16 ENCOUNTER — OFFICE VISIT (OUTPATIENT)
Dept: CARDIOLOGY CLINIC | Age: 35
End: 2020-06-16
Payer: COMMERCIAL

## 2020-06-16 VITALS
HEIGHT: 73 IN | DIASTOLIC BLOOD PRESSURE: 76 MMHG | TEMPERATURE: 98.2 F | SYSTOLIC BLOOD PRESSURE: 110 MMHG | BODY MASS INDEX: 23.43 KG/M2 | HEART RATE: 70 BPM | WEIGHT: 176.8 LBS | OXYGEN SATURATION: 99 %

## 2020-06-16 PROCEDURE — 99213 OFFICE O/P EST LOW 20 MIN: CPT | Performed by: INTERNAL MEDICINE

## 2020-06-16 PROCEDURE — 93000 ELECTROCARDIOGRAM COMPLETE: CPT | Performed by: INTERNAL MEDICINE

## 2020-08-14 ENCOUNTER — OFFICE VISIT (OUTPATIENT)
Dept: FAMILY MEDICINE CLINIC | Age: 35
End: 2020-08-14
Payer: COMMERCIAL

## 2020-08-14 VITALS
BODY MASS INDEX: 23.98 KG/M2 | WEIGHT: 177 LBS | TEMPERATURE: 98.4 F | HEART RATE: 89 BPM | HEIGHT: 72 IN | SYSTOLIC BLOOD PRESSURE: 124 MMHG | OXYGEN SATURATION: 98 % | DIASTOLIC BLOOD PRESSURE: 64 MMHG

## 2020-08-14 PROCEDURE — 36415 COLL VENOUS BLD VENIPUNCTURE: CPT | Performed by: FAMILY MEDICINE

## 2020-08-14 PROCEDURE — 99395 PREV VISIT EST AGE 18-39: CPT | Performed by: FAMILY MEDICINE

## 2020-08-14 ASSESSMENT — PATIENT HEALTH QUESTIONNAIRE - PHQ9
SUM OF ALL RESPONSES TO PHQ QUESTIONS 1-9: 0
SUM OF ALL RESPONSES TO PHQ QUESTIONS 1-9: 0
1. LITTLE INTEREST OR PLEASURE IN DOING THINGS: 0
2. FEELING DOWN, DEPRESSED OR HOPELESS: 0
SUM OF ALL RESPONSES TO PHQ9 QUESTIONS 1 & 2: 0

## 2020-08-14 NOTE — PROGRESS NOTES
2020    Carrie Abreu (:  1985) is a34 y.o. male, here for a preventive medicine evaluation. Some L elbow discomfort with certain movements    Lipoma on back of neck, chronic    Patient Active Problem List   Diagnosis    Acute medial meniscal tear    ACL tear    [facility] ([facility]A) [facility] [address]    Leukocytosis    Head ache    PFO (patent foramen ovale)    Closed displaced fracture of base of fourth metacarpal bone of right hand    Closed displaced fracture of body of hamate of right wrist       Prior to Visit Medications    Medication Sig Taking? Authorizing Provider   atorvastatin (LIPITOR) 40 MG tablet Take 1 tablet by mouth nightly Yes Josefa Sánchez MD   aspirin 81 MG tablet Take 1 tablet by mouth daily Yes JAMES Tyson CNP        No Known Allergies    Past Medical History:   Diagnosis Date    CVA (cerebral vascular accident) ([facility] [address] 2019    Hernia     History of bone graft     PFO (patent foramen ovale) 2019    PFO closure with 25 mm PFO occluder device    TMJ (dislocation of temporomandibular joint)        Past Surgical History:   Procedure Laterality Date    ANTERIOR CRUCIATE LIGAMENT REPAIR Left 9/10/14    BONE GRAFT      left wrist    HAND SURGERY      HAND SURGERY      right thumb pins       Social History     Socioeconomic History    Marital status: Single     Spouse name: Not on file    Number of children: Not on file    Years of education: Not on file    Highest education level: Not on file   Occupational History    Not on file   Social Needs    Financial resource strain: Not on file    Food insecurity     Worry: Not on file     Inability: Not on file    Transportation needs     Medical: Not on file     Non-medical: Not on file   Tobacco Use    Smoking status: Former Smoker     Packs/day: 0.33     Types: Cigarettes    Smokeless tobacco: Former User    Tobacco comment: quit 3/2019   Substance and Sexual Activity    Alcohol use:  Yes Alcohol/week: 12.0 standard drinks     Types: 12 Cans of beer per week     Comment: 5 drinks per week    Drug use: No     Types: Marijuana     Comment: last used 2012    Sexual activity: Yes     Partners: Female   Lifestyle    Physical activity     Days per week: Not on file     Minutes per session: Not on file    Stress: Not on file   Relationships    Social connections     Talks on phone: Not on file     Gets together: Not on file     Attends Evangelical service: Not on file     Active member of club or organization: Not on file     Attends meetings of clubs or organizations: Not on file     Relationship status: Not on file    Intimate partner violence     Fear of current or ex partner: Not on file     Emotionally abused: Not on file     Physically abused: Not on file     Forced sexual activity: Not on file   Other Topics Concern    Not on file   Social History Narrative    ** Merged History Encounter **             Family History   Problem Relation Age of Onset    Cancer Maternal Grandmother     Cancer Maternal Grandfather     Heart Failure Maternal Grandfather     Cancer Paternal Grandmother     High Blood Pressure Paternal Grandmother         MI    Cancer Paternal Grandfather     Other Paternal Grandfather        ADVANCE DIRECTIVE: N, Not Received    Vitals:    08/14/20 1403   BP: 124/64   Pulse: 89   Temp: 98.4 °F (36.9 °C)   TempSrc: Temporal   SpO2: 98%   Weight: 177 lb (80.3 kg)   Height: 6' (1.829 m)     Estimated body mass index is 24.01 kg/m² as calculated from the following:    Height as of this encounter: 6' (1.829 m). Weight as of this encounter: 177 lb (80.3 kg). Physical Exam  Constitutional: Patient appears well-developed and well-nourished   Ears, Nose, Mouth & Throat: external inspection of ears and nose show no scars, lesions or masses, pt can hear finger rub bilaterally  Neck: neck is supple. No thyromegaly present   Cardiovascular: Normal rate.  No lower ext edema present  Respiratory: Effort normal and breath sounds normal. No respiratory distress. No W/R/R  Gastrointestinal: Soft. There is no tenderness. No hernias  Musculoskeletal: Normal range of motion of extremities, normal gait  Psychiatric: judgment and insight appropriate for age, no agitation  Skin: Skin is warm and dry, soft mobile sub q lump on neck, approx 1cm in diameter    No flowsheet data found. Lab Results   Component Value Date    CHOL 140 03/12/2019    TRIG 79 03/12/2019    HDL 40 03/12/2019    LDLCALC 84 03/12/2019    GLUCOSE 73 05/10/2019       The ASCVD Risk score (Rodolfo Wiley et al., 2013) failed to calculate for the following reasons: The 2013 ASCVD risk score is only valid for ages 36 to 78    The patient has a prior MI or stroke diagnosis    Immunization History   Administered Date(s) Administered    Tdap (Boostrix, Adacel) 03/22/2019       Health Maintenance   Topic Date Due    Varicella vaccine (1 of 2 - 2-dose childhood series) 11/10/1987    HIV screen  11/10/2001    Lipid screen  03/12/2020    Flu vaccine (1) 09/01/2020    DTaP/Tdap/Td vaccine (2 - Td) 03/22/2029    Hepatitis A vaccine  Aged Out    Hepatitis B vaccine  Aged Out    Hib vaccine  Aged Out    Meningococcal (ACWY) vaccine  Aged Out    Pneumococcal 0-64 years Vaccine  Aged Out       ASSESSMENT/PLAN:    Screening blood work as below  tobacco screening neg  Tdap UTD  Pt to discuss with partents if he ever had chicken pox, if he did then no need for vaccine, if not he will get the 2 shot series    Leila Sharif was seen today for annual exam and arm pain. Diagnoses and all orders for this visit:    Healthcare maintenance  -     Hemoglobin A1C  -     Lipid Panel  -     Comprehensive Metabolic Panel    Lipoma, unspecified site  -     Giovani Thomas MD, General Surgery, [facility]    Left elbow pain  -     [address] [facility] [address]      Return in about 1 year (around 8/14/2021).     An electronic signature was used to authenticate this note.     --Linda Rios MD on 8/14/2020 at 2:31 PM

## 2020-08-15 LAB
A/G RATIO: 1.8 (ref 1.1–2.2)
ALBUMIN SERPL-MCNC: 4.4 G/DL (ref 3.4–5)
ALP BLD-CCNC: 55 U/L (ref 40–129)
ALT SERPL-CCNC: 19 U/L (ref 10–40)
ANION GAP SERPL CALCULATED.3IONS-SCNC: 13 MMOL/L (ref 3–16)
AST SERPL-CCNC: 15 U/L (ref 15–37)
BILIRUB SERPL-MCNC: 0.5 MG/DL (ref 0–1)
BUN BLDV-MCNC: 9 MG/DL (ref 7–20)
CALCIUM SERPL-MCNC: 9.3 MG/DL (ref 8.3–10.6)
CHLORIDE BLD-SCNC: 102 MMOL/L (ref 99–110)
CHOLESTEROL, TOTAL: 124 MG/DL (ref 0–199)
CO2: 23 MMOL/L (ref 21–32)
CREAT SERPL-MCNC: 0.9 MG/DL (ref 0.9–1.3)
GFR AFRICAN AMERICAN: >60
GFR NON-AFRICAN AMERICAN: >60
GLOBULIN: 2.5 G/DL
GLUCOSE BLD-MCNC: 101 MG/DL (ref 70–99)
HDLC SERPL-MCNC: 44 MG/DL (ref 40–60)
LDL CHOLESTEROL CALCULATED: 66 MG/DL
POTASSIUM SERPL-SCNC: 4.3 MMOL/L (ref 3.5–5.1)
SODIUM BLD-SCNC: 138 MMOL/L (ref 136–145)
TOTAL PROTEIN: 6.9 G/DL (ref 6.4–8.2)
TRIGL SERPL-MCNC: 71 MG/DL (ref 0–150)
VLDLC SERPL CALC-MCNC: 14 MG/DL

## 2020-08-16 LAB
ESTIMATED AVERAGE GLUCOSE: 114 MG/DL
HBA1C MFR BLD: 5.6 %

## 2020-08-21 ENCOUNTER — OFFICE VISIT (OUTPATIENT)
Dept: SURGERY | Age: 35
End: 2020-08-21
Payer: COMMERCIAL

## 2020-08-21 VITALS
HEART RATE: 79 BPM | BODY MASS INDEX: 23.57 KG/M2 | SYSTOLIC BLOOD PRESSURE: 115 MMHG | WEIGHT: 174 LBS | TEMPERATURE: 97.3 F | DIASTOLIC BLOOD PRESSURE: 67 MMHG | HEIGHT: 72 IN

## 2020-08-21 PROCEDURE — 99202 OFFICE O/P NEW SF 15 MIN: CPT | Performed by: SURGERY

## 2020-08-21 NOTE — PATIENT INSTRUCTIONS
[address] [address]  Phone: [phone]  Fax: [phone] will be scheduled for surgery with Dr. Alec Issa. · The office will call you with the date and time that surgery is scheduled. · Please take note of these instructions for surgery:  · You should have nothing by mouth after midnight the night before your surgery - this includes no food or water. · Your surgery will be cancelled if you have taken anything by mouth after midnight, NO exceptions. · You will need to have a history and physical prior to your surgery. This will need to be completed up to 30 days before your surgery. This H/P can be completed by your family doctor or the hospital.   · IF you take coumadin (warfarin), please stop taking this medication 5 days prior to your surgery. · IF you take plavix, please stop taking this 7 days prior to your surgery. · Please contact our office if you have a pacemaker or defibrillator. · IF you are allergic to latex, please tell our office prior to your surgery. This is important in know before scheduling your surgery. · IF you are having an out patient surgery, you will need someone available to drive you home after your surgery, and to also stay with you for the rest of the day. · IF you are having a surgery requiring an inpatient stay in the hospital, you will need someone to drive you home upon discharge from the hospital.  · Please contact Dr. Marychuy Ovalles assistant Collin Paige if you have any questions or concerns. · Please call the office with any changes in your symptoms or further questions/concerns.  [phone]

## 2020-08-21 NOTE — PROGRESS NOTES
New Patient [address] [place] [address] Drive Surgery Radha Lizama, [address] [facility], [address] [facility], [address] [facility]  Phone: [phone]  Fax: Jim Carrillo   YOB: 1985    Date of Visit:  8/21/2020    Lorna Edge MD    HPI:    Patient complains of a mass of the posterior scalp at hairline. The mass has been present for several years. The mass has not changed in  years. Symptoms associated  are: none. Patient denies increasing diameter, increasing number of lesions, pain, drainage, infection.     No Known Allergies  Outpatient Medications Marked as Taking for the 8/21/20 encounter (Office Visit) with Shelton Giles MD   Medication Sig Dispense Refill    atorvastatin (LIPITOR) 40 MG tablet Take 1 tablet by mouth nightly 90 tablet 3    aspirin 81 MG tablet Take 1 tablet by mouth daily 90 tablet 3       Past Medical History:   Diagnosis Date    CVA (cerebral vascular accident) ([facility] [address] 2019    Hernia     History of bone graft     PFO (patent foramen ovale) 05/09/2019    PFO closure with 25 mm PFO occluder device    TMJ (dislocation of temporomandibular joint)      Past Surgical History:   Procedure Laterality Date    ANTERIOR CRUCIATE LIGAMENT REPAIR Left 9/10/14    BONE GRAFT      left wrist    HAND SURGERY      HAND SURGERY      right thumb pins     Family History   Problem Relation Age of Onset    Cancer Maternal Grandmother     Cancer Maternal Grandfather     Heart Failure Maternal Grandfather     Cancer Paternal Grandmother     High Blood Pressure Paternal Grandmother         MI    Cancer Paternal Grandfather     Other Paternal Grandfather      Social History     Socioeconomic History    Marital status: Single     Spouse name: Not on file    Number of children: Not on file    Years of education: Not on file    Highest education level: Not on file   Occupational History    Not on file   Social Needs    Financial resource strain: Not on file    Food insecurity     Worry: Not on file     Inability: Not on file    Transportation needs     Medical: Not on file     Non-medical: Not on file   Tobacco Use    Smoking status: Former Smoker     Packs/day: 0.33     Types: Cigarettes    Smokeless tobacco: Former User    Tobacco comment: quit 3/2019   Substance and Sexual Activity    Alcohol use: Yes     Alcohol/week: 12.0 standard drinks     Types: 12 Cans of beer per week     Comment: 5 drinks per week    Drug use: No     Types: Marijuana     Comment: last used 2012    Sexual activity: Yes     Partners: Female   Lifestyle    Physical activity     Days per week: Not on file     Minutes per session: Not on file    Stress: Not on file   Relationships    Social connections     Talks on phone: Not on file     Gets together: Not on file     Attends Gnosticist service: Not on file     Active member of club or organization: Not on file     Attends meetings of clubs or organizations: Not on file     Relationship status: Not on file    Intimate partner violence     Fear of current or ex partner: Not on file     Emotionally abused: Not on file     Physically abused: Not on file     Forced sexual activity: Not on file   Other Topics Concern    Not on file   Social History Narrative    ** Merged History Encounter **               Vitals:    08/21/20 1621   BP: 115/67   Site: Right Wrist   Position: Sitting   Cuff Size: Medium Adult   Pulse: 79   Temp: 97.3 °F (36.3 °C)   Weight: 174 lb (78.9 kg)   Height: 6' 0.01\" (1.829 m)     Body mass index is 23.59 kg/m².      Wt Readings from Last 3 Encounters:   08/21/20 174 lb (78.9 kg)   08/14/20 177 lb (80.3 kg)   06/16/20 176 lb 12.8 oz (80.2 kg)     BP Readings from Last 3 Encounters:   08/21/20 115/67   08/14/20 124/64   06/16/20 110/76          REVIEW OF SYSTEMS:   · All other systems reviewed; please refer to HPI with pertinent positives, all other ROS are negative    PHYSICAL EXAM:    CONSTITUTIONAL:  awake, alert, no apparent distress and normal weight  ENT:  normocepalic, without obvious abnormality  NECK:  supple, symmetrical, trachea midline   LUNGS:  Resp easy and unlabored  CARDIOVASCULAR:     ABDOMEN:   MUSCULOSKELETAL: No edema  NEUROLOGIC:  Mental Status Exam:  Level of Alertness:   awake  Orientation:   person, place, time    A mass of size 2x2 cm is felt in the midline posterior scalp at the hairline, firm, mobile, non-tender, no drainage, no erythema. DATA:       ASSESSMENT:     Diagnosis Orders   1. Sebaceous cyst           PLAN:    We will schedule the pt for cyst excision under local anesthesia. Will have patient hold his Plavix for 3 days prior to surgery. The technical aspects, risks, benefits and complications of the procedure were discussed with the patient. The pt appears to understand, asks appropriate questions, and agrees to proceed with the procedure.        [employer] [place]

## 2020-08-24 ENCOUNTER — OFFICE VISIT (OUTPATIENT)
Dept: PRIMARY CARE CLINIC | Age: 35
End: 2020-08-24
Payer: COMMERCIAL

## 2020-08-24 ENCOUNTER — NURSE TRIAGE (OUTPATIENT)
Dept: OTHER | Facility: CLINIC | Age: 35
End: 2020-08-24

## 2020-08-24 PROCEDURE — 99211 OFF/OP EST MAY X REQ PHY/QHP: CPT | Performed by: NURSE PRACTITIONER

## 2020-08-24 NOTE — PROGRESS NOTES
Sonal Mijares received a viral test for COVID-19. They were educated on isolation and quarantine as appropriate. For any symptoms, they were directed to seek care from their PCP, given contact information to establish with a doctor, directed to an urgent care or the emergency room.

## 2020-08-24 NOTE — TELEPHONE ENCOUNTER
Reason for Disposition   [1] Symptoms of COVID-19 (e.g., cough, fever, SOB, or others) AND [2] within 14 days of EXPOSURE (close contact) with diagnosed or suspected COVID-19 patient    Answer Assessment - Initial Assessment Questions  1. CLOSE CONTACT: \"Who is the person with the confirmed or suspected COVID-19 infection that you were exposed to?\"      Ex significant other/daughter  2. PLACE of CONTACT: \"Where were you when you were exposed to COVID-19? \" (e.g., home, school, medical waiting room; which city?)      Daughters mothers house  3. TYPE of CONTACT: \"How much contact was there? \" (e.g., sitting next to, live in same house, work in same office, same building)      In same room  4. DURATION of CONTACT: \"How long were you in contact with the COVID-19 patient? \" (e.g., a few seconds, passed by person, a few minutes, live with the patient)      Lives with patient  5. DATE of CONTACT: \"When did you have contact with a COVID-19 patient? \" (e.g., how many days ago)      5 days ago  6. TRAVEL: \"Have you traveled out of the country recently? \" If so, \"When and where? \"      * Also ask about out-of-state travel, since the [facility] has identified some high-risk cities for community spread in the [address] [place] [address]. * Note: Travel becomes less relevant if there is widespread community transmission where the patient lives. no  7. COMMUNITY SPREAD: \"Are there lots of cases of COVID-19 (community spread) where you live? \" (See public health department website, if unsure)        unknown  8. SYMPTOMS: \"Do you have any symptoms? \" (e.g., fever, cough, breathing difficulty)      Vomiting, diarrhea, chills, body aches, rash on arms, sore throat  9. PREGNANCY OR POSTPARTUM: \"Is there any chance you are pregnant? \" \"When was your last menstrual period? \" \"Did you deliver in the last 2 weeks? \"      n/a  10. HIGH RISK: \"Do you have any heart or lung problems? Do you have a weak immune system? \" (e.g., CHF, COPD, asthma, HIV positive, chemotherapy, renal failure, diabetes mellitus, sickle cell anemia)        PFO occluder, CVA    Protocols used: CORONAVIRUS (COVID-19) EXPOSURE-ADULT-[place]

## 2020-08-24 NOTE — PROGRESS NOTES
Eilleen Apo    Age 36 y.o.    male    1985    MRN [id number]    8/28/2020  Arrival Time_____________  OR Time____________[address]     Procedure(s):  SEBACEOUS CYST EXCISION, POSTERIOR SCALP                      Local    Surgeon(s):  Jordana Michelle, MD       Phone [phone] ([place])     [address]  Cell Work  _________________________________________________________________  _________________________________________________________________  _________________________________________________________________  _________________________________________________________________  _________________________________________________________________      PCP _____________________________ Phone_________________       H&P__________________Bringing    Chart            Epic  DOS     Called_______  EKG__________________Bringing    Chart            Epic  DOS     Called_______  LAB__________________ Bringing    Chart            Epic  DOS     Called_______  Cardiac Clearance_______Bringing    Chart            Epic      DOS       Called_______    Cardiologist________________________ Phone___________________________      ? Muslim concerns / Waiver on Chart            PAT [employer]_____________  ? Pre-op Instructions Given [place]          ______________________________  ? Directions to Surgery Center                          ______________________________  ? Transportation Home_______________      _______________________________  ?  Crutches/Walker__________________        _______________________________      ________Pre-op Orders   _______Transcribed    _______Wt.  ________Pharmacy          _______SCD  ______VTE     ______[employer]  ________Consent             ________[facility] [place]

## 2020-08-24 NOTE — PATIENT INSTRUCTIONS

## 2020-08-25 ENCOUNTER — TELEPHONE (OUTPATIENT)
Dept: SURGERY | Age: 35
End: 2020-08-25

## 2020-08-25 LAB — SARS-COV-2, NAA: NOT DETECTED

## 2020-08-25 NOTE — PROGRESS NOTES
Preoperative Screening for Elective Surgery/Invasive Procedures While COVID-19 present in the community     Have you tested positive or have been told to self-isolate for COVID-19 like symptoms within the past 28 days? No   Do you currently have any of the following symptoms? No  o Fever >100.0 F or 99.9 F in immunocompromised patients? No  o New onset cough, shortness of breath or difficulty breathing? No  o New onset sore throat, myalgia (muscle aches and pains), headache, loss of taste/smell or diarrhea? No   Have you had a potential exposure to COVID-19 within the past 14 days by:  o Close contact with a confirmed case? Yes - daughter was exposed and daughter lives with patient  o Close contact with a healthcare worker,  or essential infrastructure worker (grocery store, [employer], gas station, public utilities or transportation)? Yes - wife is a nurse  o Do you reside in a congregate setting such as; skilled nursing facility, adult home, correctional facility, homeless shelter or other institutional setting? No  o Have you had recent travel to a known COVID-19 hotspot? No    Indicate if the patient has a positive screen by answering yes to one or more of the above questions. Patients who test positive or screen positive prior to surgery or on the day of surgery should be evaluated in conjunction with the surgeon/proceduralist/anesthesiologist to determine the urgency of the procedure.

## 2020-08-26 ENCOUNTER — TELEPHONE (OUTPATIENT)
Dept: SURGERY | Age: 35
End: 2020-08-26

## 2020-08-26 NOTE — TELEPHONE ENCOUNTER
Maribell from PAT called stating pts infant daughter and ex wife were tested positive for COVID. Pt does not live w/ wife/daughter but needs to know if pt can still have sx this Fri at the [facility] and get a rapid test done the morning of procedure or reschedule pts surgery.   Spoke w/ Dr Harika Melgar during clinic who said to go ahead and reschedule pt  Moon Bergeron at [facility] and informed - also called pt and rescheduled to 9/18/20 @ 8:45am arrival 7:45am

## 2020-09-08 ENCOUNTER — OFFICE VISIT (OUTPATIENT)
Dept: PRIMARY CARE CLINIC | Age: 35
End: 2020-09-08
Payer: COMMERCIAL

## 2020-09-08 PROCEDURE — 99211 OFF/OP EST MAY X REQ PHY/QHP: CPT | Performed by: NURSE PRACTITIONER

## 2020-09-08 NOTE — PATIENT INSTRUCTIONS

## 2020-09-08 NOTE — PROGRESS NOTES
Servando Damon received a viral test for COVID-19. They were educated on isolation and quarantine as appropriate. For any symptoms, they were directed to seek care from their PCP, given contact information to establish with a doctor, directed to an urgent care or the emergency room.

## 2020-09-10 LAB — SARS-COV-2, NAA: NOT DETECTED

## 2020-09-18 ENCOUNTER — HOSPITAL ENCOUNTER (OUTPATIENT)
Age: 35
Setting detail: OUTPATIENT SURGERY
Discharge: HOME | End: 2020-09-18
Attending: SURGERY | Admitting: SURGERY
Payer: COMMERCIAL

## 2020-09-18 VITALS
BODY MASS INDEX: 23.57 KG/M2 | WEIGHT: 174 LBS | DIASTOLIC BLOOD PRESSURE: 77 MMHG | TEMPERATURE: 97.4 F | HEIGHT: 72 IN | SYSTOLIC BLOOD PRESSURE: 125 MMHG | RESPIRATION RATE: 16 BRPM | OXYGEN SATURATION: 99 % | HEART RATE: 61 BPM

## 2020-09-18 PROCEDURE — 2709999900 HC NON-CHARGEABLE SUPPLY: Performed by: SURGERY

## 2020-09-18 PROCEDURE — 88304 TISSUE EXAM BY PATHOLOGIST: CPT

## 2020-09-18 PROCEDURE — 2500000003 HC RX 250 WO HCPCS: Performed by: SURGERY

## 2020-09-18 PROCEDURE — 11422 EXC H-F-NK-SP B9+MARG 1.1-2: CPT | Performed by: SURGERY

## 2020-09-18 PROCEDURE — 3600000002 HC SURGERY LEVEL 2 BASE: Performed by: SURGERY

## 2020-09-18 PROCEDURE — 7100000010 HC PHASE II RECOVERY - FIRST 15 MIN: Performed by: SURGERY

## 2020-09-18 PROCEDURE — 3600000012 HC SURGERY LEVEL 2 ADDTL 15MIN: Performed by: SURGERY

## 2020-09-18 PROCEDURE — 7100000011 HC PHASE II RECOVERY - ADDTL 15 MIN: Performed by: SURGERY

## 2020-09-18 PROCEDURE — 2580000003 HC RX 258: Performed by: SURGERY

## 2020-09-18 RX ORDER — MAGNESIUM HYDROXIDE 1200 MG/15ML
LIQUID ORAL CONTINUOUS PRN
Status: COMPLETED | OUTPATIENT
Start: 2020-09-18 | End: 2020-09-18

## 2020-09-18 ASSESSMENT — PAIN - FUNCTIONAL ASSESSMENT: PAIN_FUNCTIONAL_ASSESSMENT: 0-10

## 2020-09-18 ASSESSMENT — PAIN SCALES - GENERAL
PAINLEVEL_OUTOF10: 0

## 2020-09-18 NOTE — BRIEF OP NOTE
Brief Postoperative Note      Patient: Servando Damon  YOB: 1985  MRN: [id number]    Date of Procedure: 9/18/2020    Pre-Op Diagnosis: Sebaceous cyst [L72.3]    Post-Op Diagnosis: Same       Procedure(s):  SEBACEOUS CYST EXCISION, POSTERIOR SCALP    Surgeon(s):  Olga Cabral MD    Assistant:  * No surgical staff found *    Anesthesia: Local    Estimated Blood Loss (mL): less than 50     Complications: None    Specimens:   ID Type Source Tests Collected by Time Destination   A : CYST- POSTERIOR SCALP Tissue Tissue SURGICAL PATHOLOGY Olga Cabral MD 9/18/2020 3112        Implants:  * No implants in log *      Drains: * No LDAs found *    Findings: ~1x1cm sebaceous cyst, posterior scalp    Job#: [id number]    Electronically signed by Honorio Berg MD on 9/21/2020 at 9:34 AM

## 2020-09-18 NOTE — H&P
HISTORY & PHYSICAL FOR LOCAL CASES    Vitals:    09/18/20 0814   BP: 115/80   Pulse: 62   Resp: 18   Temp: 97.4 °F (36.3 °C)   SpO2: 98%         History of present illness:  ~2-3cm mass, posterior scalp    All allergies & meds reviewed  RELEVANT EXAMS:  Airway:  normal    Pulmonary: normal    Cardiovascular: normal    Abdominal: normal    Specific to procedure: 2-3cm subcutaneous mass, posterior/occipital mass    I have reviewed with the patient and/or family the risks, benefits and alternatives to the procedure.   ASA Class:  1    The patient condition is acceptable for planned local anesthesia:

## 2020-09-18 NOTE — PROGRESS NOTES
Preoperative Screening for Elective Surgery/Invasive Procedures While COVID-19 present in the community     Have you tested positive or have been told to self-isolate for COVID-19 like symptoms within the past 28 days? No   Do you currently have any of the following symptoms? No  o Fever >100.0 F or 99.9 F in immunocompromised patients? No  o New onset cough, shortness of breath or difficulty breathing? No  o New onset sore throat, myalgia (muscle aches and pains), headache, loss of taste/smell or diarrhea? No   Have you had a potential exposure to COVID-19 within the past 14 days by:  o Close contact with a confirmed case? No  o Close contact with a healthcare worker,  or essential infrastructure worker (grocery store, [employer], gas station, public utilities or transportation)? No  o Do you reside in a congregate setting such as; skilled nursing facility, adult home, correctional facility, homeless shelter or other institutional setting? No  o Have you had recent travel to a known COVID-19 hotspot? No    Indicate if the patient has a positive screen by answering yes to one or more of the above questions. Patients who test positive or screen positive prior to surgery or on the day of surgery should be evaluated in conjunction with the surgeon/proceduralist/anesthesiologist to determine the urgency of the procedure.        Lisa TESTED + FOR COVID 1 MONTH AGO, PT NEGATIVE X 2

## 2020-09-21 NOTE — OP NOTE
[facility] [address], [place] [address]                                OPERATIVE REPORT    PATIENT NAME: Joie Dick                      :        1985  MED REC NO:   [id number]                          ROOM:  ACCOUNT NO:   [de-identified]                           ADMIT DATE: 2020  PROVIDER:     Paulo Arnold MD    DATE OF PROCEDURE:  2020    PREOPERATIVE DIAGNOSIS:  Sebaceous cyst, posterior scalp. POSTOPERATIVE DIAGNOSIS:  Sebaceous cyst, posterior scalp. OPERATION PERFORMED:  Sebaceous cyst excision. SURGEON:  Paulo Arnold MD    ANESTHESIA:  Local.    ESTIMATED BLOOD LOSS:  Less than 50 mL. SPECIMEN:  Posterior scalp cyst.    SPONGE AND NEEDLE COUNT:  Correct. INDICATIONS:  The patient is a 78-year-old male with an enlarging  subcutaneous mass in the posterior occipital scalp region consistent  with a cyst.  He presents for elective excision. FINDINGS:  Approximately, 1 x 1 cm sebaceous cyst of the posterior  scalp, excised. OPERATIVE PROCEDURE:  After informed consent was obtained, the patient  was taken to the operating room and placed in the prone position with  appropriate padding to all pressure points. An easily palpable and  previously marked lesion on the posterior midline scalp was prepped and  draped in a sterile fashion. A small amount of hair had been clipped to  expose the scalp better. The skin was infiltrated with local  anesthesia. An incision made overlying the cyst and then dissection  carried down through the dermis until the cyst wall was encountered. Blunt and sharp dissection was carried around the cyst until it was  fully mobilized and removed. The skin was now closed with 3-0 Vicryl  subcutaneous sutures followed by a 4-0 Monocryl subcuticular stitch. Dermabond was applied. The patient was taken to the recovery room in  stable condition.         Steve Cline MD    D: 09/21/2020 9:33:49       T: 09/21/2020 11:01:18     [email or web address]  Job#: [id number]     Doc#: [id number]    CC:  Dr. Caio Guardado

## 2020-10-02 ENCOUNTER — TELEPHONE (OUTPATIENT)
Dept: FAMILY MEDICINE CLINIC | Age: 35
End: 2020-10-02

## 2020-10-02 ENCOUNTER — VIRTUAL VISIT (OUTPATIENT)
Dept: FAMILY MEDICINE CLINIC | Age: 35
End: 2020-10-02
Payer: COMMERCIAL

## 2020-10-02 PROCEDURE — 99214 OFFICE O/P EST MOD 30 MIN: CPT | Performed by: FAMILY MEDICINE

## 2020-10-02 NOTE — TELEPHONE ENCOUNTER
LOV - 8/14/20      Patients gloriastepan is calling for advice,     Nikolas Maharaj is having severe stomach cramps and diarrhea, fever as high as 103.5. It is now down to 99.9 with alternating  Tylenol and Advil. But is going back up. He is not eating because he feels nauseous. Please advise.   Antonio Hanson [phone]

## 2020-10-02 NOTE — PROGRESS NOTES
10/2/2020    TELEHEALTH EVALUATION -- Audio/Visual (During [place]PDP[place]-[id number] public health emergency)    Chief Complaint   Patient presents with    Fever     1 day,chills ,headache    Diarrhea     cramping,bodyaches,insomnia,     HPI    Imani Maynard (:  1985) elysia 36 y.o. male has requested an audio/video evaluation for the following concern(s):    Diarrhea, unspecified type  Onset: 20 11:00 for lunch, ate \"a bad whoper\" from [employer],  stomach felt \"weird\" afterwards but able to get through the day, next day diarrhea, cramping, going multiple times per day, stool looks pinkish and has mucous  Duration: 2 days  Assoc symptoms: body aches, chills, cramping    daughter had covid , pt tested neg twice    Fever, unspecified fever cause  Currently 102.1F   Tylenol and IB profen have helped    Review of Systems  Constitutional: + fever, currently 102.1F  GI: + diarrhea for past 2 days    All other systems reviewed and negative    Prior to Visit Medications    Medication Sig Taking?  Authorizing Provider   atorvastatin (LIPITOR) 40 MG tablet Take 1 tablet by mouth nightly Yes Anson Llamas MD   aspirin 81 MG tablet Take 1 tablet by mouth daily Yes Kenzie Malcolm, APRN - CNP        No Known Allergies    Past Medical History:   Diagnosis Date    CVA (cerebral vascular accident) ([facility] [address]     no residuals EXCEPT PERIPHERAL RT SIDE VISION    Hernia     History of bone graft     PFO (patent foramen ovale) 2019    PFO closure with 25 mm PFO occluder device    TMJ (dislocation of temporomandibular joint)        Past Surgical History:   Procedure Laterality Date    ANTERIOR CRUCIATE LIGAMENT REPAIR Left 9/10/14    BONE GRAFT      left wrist    CARDIAC SURGERY  2019    PFO  plACED AFTER STROKE    HAND SURGERY      right thumb pins    PRE-MALIGNANT / BENIGN SKIN LESION EXCISION N/A 2020    SEBACEOUS CYST EXCISION, POSTERIOR SCALP performed by Eric Hardy MD at [address] [facility] Social History     Socioeconomic History    Marital status: Single     Spouse name: Not on file    Number of children: Not on file    Years of education: Not on file    Highest education level: Not on file   Occupational History    Not on file   Social Needs    Financial resource strain: Not on file    Food insecurity     Worry: Not on file     Inability: Not on file    Transportation needs     Medical: Not on file     Non-medical: Not on file   Tobacco Use    Smoking status: Former Smoker     Packs/day: 0.33     Types: Cigarettes     Last attempt to quit: 3/11/2019     Years since quittin.5    Smokeless tobacco: Former User    Tobacco comment: quit 3/2019   Substance and Sexual Activity    Alcohol use:  Yes     Alcohol/week: 6.0 standard drinks     Types: 6 Cans of beer per week     Comment: 6 drinks per week    Drug use: No     Types: Marijuana     Comment: last used     Sexual activity: Yes     Partners: Female   Lifestyle    Physical activity     Days per week: Not on file     Minutes per session: Not on file    Stress: Not on file   Relationships    Social connections     Talks on phone: Not on file     Gets together: Not on file     Attends Religion service: Not on file     Active member of club or organization: Not on file     Attends meetings of clubs or organizations: Not on file     Relationship status: Not on file    Intimate partner violence     Fear of current or ex partner: Not on file     Emotionally abused: Not on file     Physically abused: Not on file     Forced sexual activity: Not on file   Other Topics Concern    Not on file   Social History Narrative    ** Merged History Encounter **             Family History   Problem Relation Age of Onset    Cancer Maternal Grandmother     Cancer Maternal Grandfather     Heart Failure Maternal Grandfather     Cancer Paternal Grandmother     High Blood Pressure Paternal Grandmother         MI    Cancer Paternal Grandfather     Other Paternal Grandfather        PHYSICAL EXAMINATION:    Constitutional:  Appears well-developed and well-nourished. No apparent distress    Mental status:  Alert and awake. Able to follow commands  Eyes: normal EOM, normal sclera, no discharge visible  HENT: Normocephalic, atraumatic  External Ears: Normal  Neck: No visualized mass   Pulmonary/Chest:  Respiratory effort normal. No visualized signs of difficulty breathing or respiratory distress  Musculoskeletal: Normal range of motion of neck  Neurological: No Facial Asymmetry (Cranial nerve 7 motor function) (limited exam to video visit). No gaze palsy  Skin: No significant exanthematous lesions or discoloration noted on facial skin  Psychiatric: Normal affect, no hallucinations    ASSESSMENT/PLAN:  Pieter Jarrell was seen today for fever and diarrhea. Diagnoses and all orders for this visit:    Diarrhea, unspecified type  Undiagnosed with uncertain prognosis. Concern for dysentery, pt instructed to  stool collection kit and take to outpatient lab today, orders below. Push fluids, if symptoms worsen or fail to improve the patient will schedule an appointment or contact me. No abx until shiga toxin neg. Also instructed to repeat covid testing after dropping off stool sample. Pt instructed to wear mask  -     GI Bacterial Pathogens By PCR; Future  -     Fecal Leukocytes; Future  -     C DIFF TOXIN/ANTIGEN; Future  -     Comprehensive Metabolic Panel; Future  -     CBC; Future    Fever, unspecified fever cause  Ok to cont alternating tylenol and Ib profen    Return in about 1 week (around 10/9/2020), or if symptoms worsen or fail to improve. Marissa Madera (:  1985) elysia 36 y.o. male is being evaluated by a Virtual Visit (video visit) encounter to address concerns as mentioned above. A caregiver was present when appropriate.  Due to this being a TeleHealth encounter (During [place] [address] public health emergency), evaluation of the following

## 2020-10-23 ENCOUNTER — APPOINTMENT (OUTPATIENT)
Dept: CT IMAGING | Age: 35
End: 2020-10-23
Payer: COMMERCIAL

## 2020-10-23 ENCOUNTER — APPOINTMENT (OUTPATIENT)
Dept: MRI IMAGING | Age: 35
End: 2020-10-23
Payer: COMMERCIAL

## 2020-10-23 ENCOUNTER — APPOINTMENT (OUTPATIENT)
Dept: GENERAL RADIOLOGY | Age: 35
End: 2020-10-23
Payer: COMMERCIAL

## 2020-10-23 ENCOUNTER — HOSPITAL ENCOUNTER (EMERGENCY)
Age: 35
Discharge: HOME | End: 2020-10-23
Attending: EMERGENCY MEDICINE
Payer: COMMERCIAL

## 2020-10-23 VITALS
WEIGHT: 175 LBS | DIASTOLIC BLOOD PRESSURE: 85 MMHG | HEIGHT: 72 IN | RESPIRATION RATE: 17 BRPM | TEMPERATURE: 98 F | SYSTOLIC BLOOD PRESSURE: 126 MMHG | OXYGEN SATURATION: 98 % | HEART RATE: 62 BPM | BODY MASS INDEX: 23.7 KG/M2

## 2020-10-23 LAB
A/G RATIO: 1.9 (ref 1.1–2.2)
ALBUMIN SERPL-MCNC: 4.7 G/DL (ref 3.4–5)
ALP BLD-CCNC: 60 U/L (ref 40–129)
ALT SERPL-CCNC: 35 U/L (ref 10–40)
ANION GAP SERPL CALCULATED.3IONS-SCNC: 10 MMOL/L (ref 3–16)
AST SERPL-CCNC: 18 U/L (ref 15–37)
BASOPHILS ABSOLUTE: 0 K/UL (ref 0–0.2)
BASOPHILS RELATIVE PERCENT: 0.7 %
BILIRUB SERPL-MCNC: 0.6 MG/DL (ref 0–1)
BUN BLDV-MCNC: 13 MG/DL (ref 7–20)
CALCIUM SERPL-MCNC: 9.5 MG/DL (ref 8.3–10.6)
CHLORIDE BLD-SCNC: 100 MMOL/L (ref 99–110)
CO2: 26 MMOL/L (ref 21–32)
CREAT SERPL-MCNC: 0.8 MG/DL (ref 0.9–1.3)
EOSINOPHILS ABSOLUTE: 0 K/UL (ref 0–0.6)
EOSINOPHILS RELATIVE PERCENT: 0.3 %
GFR AFRICAN AMERICAN: >60
GFR NON-AFRICAN AMERICAN: >60
GLOBULIN: 2.5 G/DL
GLUCOSE BLD-MCNC: 117 MG/DL (ref 70–99)
HCT VFR BLD CALC: 41 % (ref 40.5–52.5)
HEMOGLOBIN: 14 G/DL (ref 13.5–17.5)
INR BLD: 1.07 (ref 0.86–1.14)
LYMPHOCYTES ABSOLUTE: 1.5 K/UL (ref 1–5.1)
LYMPHOCYTES RELATIVE PERCENT: 23.5 %
MCH RBC QN AUTO: 30.2 PG (ref 26–34)
MCHC RBC AUTO-ENTMCNC: 34.1 G/DL (ref 31–36)
MCV RBC AUTO: 88.7 FL (ref 80–100)
MONOCYTES ABSOLUTE: 0.4 K/UL (ref 0–1.3)
MONOCYTES RELATIVE PERCENT: 6.3 %
NEUTROPHILS ABSOLUTE: 4.4 K/UL (ref 1.7–7.7)
NEUTROPHILS RELATIVE PERCENT: 69.2 %
PDW BLD-RTO: 13.7 % (ref 12.4–15.4)
PLATELET # BLD: 350 K/UL (ref 135–450)
PMV BLD AUTO: 7.5 FL (ref 5–10.5)
POTASSIUM REFLEX MAGNESIUM: 4.1 MMOL/L (ref 3.5–5.1)
PROTHROMBIN TIME: 12.4 SEC (ref 10–13.2)
RBC # BLD: 4.62 M/UL (ref 4.2–5.9)
SODIUM BLD-SCNC: 136 MMOL/L (ref 136–145)
TOTAL PROTEIN: 7.2 G/DL (ref 6.4–8.2)
TROPONIN: <0.01 NG/ML
WBC # BLD: 6.4 K/UL (ref 4–11)

## 2020-10-23 PROCEDURE — 99285 EMERGENCY DEPT VISIT HI MDM: CPT

## 2020-10-23 PROCEDURE — 70450 CT HEAD/BRAIN W/O DYE: CPT

## 2020-10-23 PROCEDURE — 93005 ELECTROCARDIOGRAM TRACING: CPT | Performed by: EMERGENCY MEDICINE

## 2020-10-23 PROCEDURE — 80053 COMPREHEN METABOLIC PANEL: CPT

## 2020-10-23 PROCEDURE — 84484 ASSAY OF TROPONIN QUANT: CPT

## 2020-10-23 PROCEDURE — 70498 CT ANGIOGRAPHY NECK: CPT

## 2020-10-23 PROCEDURE — 6370000000 HC RX 637 (ALT 250 FOR IP): Performed by: EMERGENCY MEDICINE

## 2020-10-23 PROCEDURE — 85025 COMPLETE CBC W/AUTO DIFF WBC: CPT

## 2020-10-23 PROCEDURE — 6360000004 HC RX CONTRAST MEDICATION: Performed by: EMERGENCY MEDICINE

## 2020-10-23 PROCEDURE — 85610 PROTHROMBIN TIME: CPT

## 2020-10-23 PROCEDURE — 71045 X-RAY EXAM CHEST 1 VIEW: CPT

## 2020-10-23 PROCEDURE — 70551 MRI BRAIN STEM W/O DYE: CPT

## 2020-10-23 RX ORDER — ASPIRIN 81 MG/1
324 TABLET, CHEWABLE ORAL ONCE
Status: COMPLETED | OUTPATIENT
Start: 2020-10-23 | End: 2020-10-23

## 2020-10-23 RX ADMIN — IOPAMIDOL 75 ML: 755 INJECTION, SOLUTION INTRAVENOUS at 11:45

## 2020-10-23 RX ADMIN — ASPIRIN 324 MG: 81 TABLET, CHEWABLE ORAL at 14:57

## 2020-10-23 NOTE — ED TRIAGE NOTES
Alexi Carter is a 37 y/o M who presents to the ED via POV for migraine, tunnel vision, and dizziness. Pt reports symptoms began at approx 0700 today. Pt also reports Hx of defect between atria which resulted in a CVA in 2019. Pt also reports Sx has resolved with the exception of the migraine. Pt reported that a migraine was the Sx he had during his last CVA. Pt also reports that he has a Hx of right sided visual loss but reports that this morning he experienced \"tunnel vision\". Pt denies falls/injuries or other deficits. Pt resting in bed with call light within reach and updated on plan of care; pending lab results, pending CT results, pending provider to discuss findings. Pt denies any needs at this time.

## 2020-10-23 NOTE — ED PROVIDER NOTES
I received signout from Dr. Royal Jeffers at about 1:15 PM.    Briefly, this is a 36 y.o. male here for dizziness and tunnel vision. Began at 7 AM.  Patient has history of right-sided lateral hemianopsia from a prior stroke. Activated as a stroke alert. Patient was encouraged to be admitted to the hospital for stroke evaluation but he refuses at this time. Benefits and risks of refusal to be admission were discussed with the patient. We believe that he does have medical decision-making capacity. After discussion with  stroke team, plan is now to obtain MRI brain. If unremarkable, patient will be discharged with recommendation to follow-up with PCP/neurology. .    I spoke with the patient and he states that he is currently asymptomatic. The headache and tunnel vision has gone away completely. No fever or chills. Denies trauma. No neck stiffness. He is unsure what happened. Thinks he might of just had a migraine. Does not wish to come into the hospital today. Went over benefits and risks with the patient again. On exam,   General: Patient is in no acute distress  Skin: No cyanosis  HEENT: Moist mucous membranes  Heart: Regular rate, regular rhythm  Lung: No respiratory distress  Abdomen: Soft, nontender  Neuro: Moving all extremities, no facial droop, no slurred speech, answers questions appropriately. Right eye lateral hemianopsia. Otherwise visual fields intact. Extraocular movements intact. Pupils equal round reactive to light. Normal strength and sensation in bilateral upper and lower extremities. Normal extension testing. Normal finger-to-nose. Normal gait. Uvula midline. EKG reveals normal sinus rhythm. No ST changes. There is old T wave inversion in lead III consistent with EKG taken in June. Screenings  NIH Stroke Scale  Interval: Baseline  Level of Consciousness (1a. ): Alert  LOC Questions (1b. ):  Answers both correctly  LOC Commands (1c. ): Performs both tasks correctly  Best Gaze (2. ): Normal  Visual (3. ): (!) Partial hemianopia(PT reports right visual field loss is baseline since last CVA)  Facial Palsy (4. ): Normal symmetrical movement  Motor Arm, Left (5a. ): No drift  Motor Arm, Right (5b. ): No drift  Motor Leg, Left (6a. ): No drift  Motor Leg, Right (6b. ): No drift  Limb Ataxia (7. ): Absent  Sensory (8. ): Normal  Best Language (9. ): No aphasia  Dysarthria (10. ): Normal  Extinction and Inattention (11): No abnormality  Total: 1Glasgow Coma Scale  Eye Opening: Spontaneous  Best Verbal Response: Oriented  Best Motor Response: Obeys commands  [place] Coma Scale Score: 15        MDM  Patient is a 78-year-old man who presents with vision loss concerning for stroke. Has history of right-sided hemianopsia from prior stroke due to PFO which has since been repaired. Was not a TPA candidate. Currently asymptomatic. Was recommended to be admitted to the hospital however he is currently refusing. Per neurology recommendations, will obtain MRI and discussed with patient.    ----------    MRI shows no acute findings. There is evidence of his old stroke. Patient remains asymptomatic. Neurologic exam is unchanged. I sat with him and discussed risks and benefits of leaving and he still wishes to leave the hospital today. All questions answered and return precautions given. Patient agrees to call his doctor and neurologist today    Patient Referrals:  David Francis MD  [address] [place] [address] [phone]    In 1 day      [address]  [phone]    Call in 1 day        Discharge Medications:  Current Discharge Medication List          FINAL IMPRESSION  1. Dizziness    2. Vision changes        Blood pressure 131/88, pulse 60, temperature 98 °F (36.7 °C), temperature source Oral, resp. rate 16, height 6' (1.829 m), weight 175 lb (79.4 kg), SpO2 99 %.      For further details of Rama Hammond [facility] emergency department encounter, please see documentation by Dr. Cami Bucio MD  10/23/20 [id number]

## 2020-10-23 NOTE — ED PROVIDER NOTES
Emergency Department provider note    CHIEF COMPLAINT  Migraine (Pt had loss of peripheral vision at 7am this morning. states began to develop a migraine while at breakfast and fatigue. states \"I've had a stroke before and I had a migraine\") and Loss of Vision      HISTORY OF PRESENT ILLNESS  Atif Escobar is a 36 y.o. male  who presents to the ED after waking up at 7 AM this morning with vision changes. Briefly, patient has a history of PFO and CVA. PFO has been repaired approximately 1 year ago patient has had chronic right lateral hemianopsia since that time. Patient states that since waking up he has noted what he describes as tunnel vision, mild headache, and feeling off balance. Patient denies fevers, chills, or sweats. No cough, congestion. Patient denies any muscle weakness, or paresthesias. Headache is minimal at this time. . No other complaints, modifying factors or associated symptoms. I have reviewed the following from the nursing documentation.     Past Medical History:   Diagnosis Date    CVA (cerebral vascular accident) ([facility] [address] 2019    no residuals EXCEPT PERIPHERAL RT SIDE VISION    Hernia     History of bone graft     PFO (patent foramen ovale) 05/09/2019    PFO closure with 25 mm PFO occluder device    TMJ (dislocation of temporomandibular joint)      Past Surgical History:   Procedure Laterality Date    ANTERIOR CRUCIATE LIGAMENT REPAIR Left 9/10/14    BONE GRAFT      left wrist    CARDIAC SURGERY  2019    PFO  plACED AFTER STROKE    HAND SURGERY      right thumb pins    PRE-MALIGNANT / BENIGN SKIN LESION EXCISION N/A 9/18/2020    SEBACEOUS CYST EXCISION, POSTERIOR SCALP performed by Pooja Brooks MD at [address]     Family History   Problem Relation Age of Onset    Cancer Maternal Grandmother     Cancer Maternal Grandfather     Heart Failure Maternal Grandfather     Cancer Paternal Grandmother     High Blood Pressure Paternal Grandmother         MI    Cancer Paternal Grandfather     Other Paternal Grandfather      Social History     Socioeconomic History    Marital status: Single     Spouse name: Not on file    Number of children: Not on file    Years of education: Not on file    Highest education level: Not on file   Occupational History    Not on file   Social Needs    Financial resource strain: Not on file    Food insecurity     Worry: Not on file     Inability: Not on file    Transportation needs     Medical: Not on file     Non-medical: Not on file   Tobacco Use    Smoking status: Former Smoker     Packs/day: 0.33     Types: Cigarettes     Last attempt to quit: 3/11/2019     Years since quittin.6    Smokeless tobacco: Former User    Tobacco comment: quit 3/2019   Substance and Sexual Activity    Alcohol use:  Yes     Alcohol/week: 6.0 standard drinks     Types: 6 Cans of beer per week     Comment: 6 drinks per week    Drug use: No     Types: Marijuana     Comment: last used     Sexual activity: Yes     Partners: Female   Lifestyle    Physical activity     Days per week: Not on file     Minutes per session: Not on file    Stress: Not on file   Relationships    Social connections     Talks on phone: Not on file     Gets together: Not on file     Attends Yazidi service: Not on file     Active member of club or organization: Not on file     Attends meetings of clubs or organizations: Not on file     Relationship status: Not on file    Intimate partner violence     Fear of current or ex partner: Not on file     Emotionally abused: Not on file     Physically abused: Not on file     Forced sexual activity: Not on file   Other Topics Concern    Not on file   Social History Narrative    ** Merged History Encounter **          Current Facility-Administered Medications   Medication Dose Route Frequency Provider Last Rate Last Dose    iopamidol (ISOVUE-370) 76 % injection 75 mL  75 mL Intravenous ONCE PRN Darryl Pound, DO         Current Outpatient Medications   Medication Sig Dispense Refill    atorvastatin (LIPITOR) 40 MG tablet Take 1 tablet by mouth nightly 90 tablet 3    aspirin 81 MG tablet Take 1 tablet by mouth daily 90 tablet 3     No Known Allergies    REVIEW OF SYSTEMS  10 systems reviewed, pertinent positives per HPI otherwise noted to be negative. PHYSICAL EXAM  BP (!) 140/99   Pulse 75   Temp 98.3 °F (36.8 °C) (Oral)   Resp 16   Ht 6' (1.829 m)   Wt 175 lb (79.4 kg)   SpO2 98%   BMI 23.73 kg/m²   GENERAL APPEARANCE: Awake and alert. Cooperative. No distress. HEAD: Normocephalic. Atraumatic. EYES: PERRL. EOM's grossly intact. Right lateral hemianopsia noted. Consistent with chronic changes. Test of skew within normal limits. ENT: Mucous membranes are moist.  NECK: Supple. HEART: RRR. No murmurs. LUNGS: Respirations unlabored. CTAB. Good air exchange. Speaking comfortably in full sentences. ABDOMEN: Soft. Non-distended. Non-tender. No masses. No organomegaly. No guarding or rebound. EXTREMITIES: No peripheral edema. Moves all extremities equally. All extremities neurovascularly intact. SKIN: Warm and dry. No acute rashes. NEUROLOGICAL: Alert and oriented. No truncal instability. Gait is stable. No evidence of ataxia. Finger-to-nose and heel-to-shin within normal limits. Please see NIH Stroke Scale below. PSYCHIATRIC: Normal mood and affect. NIH Stroke Scale     Interval: Baseline  Time: 1123  Person Administering Scale: Forrest Ellis DO   Administer stroke scale items in the order listed. Record performance in each category after each subscale exam. Do not go back and change scores. Follow directions provided for each exam technique. Scores should reflect what the patient does, not what the clinician thinks the patient can do. The clinician should record answers while administering the exam and work quickly.  Except where indicated, the patient should not be coached (i.e., repeated requests to patient to make a special effort). 1a  Level of consciousness: 0=alert; keenly responsive   1b. LOC questions:  0=Performs both tasks correctly   1c. LOC commands: 0=Performs both tasks correctly   2. Best Gaze: 0=normal   3. Visual: 1=Partial hemianopia   4. Facial Palsy: 0=Normal symmetric movement   5a. Motor left arm: 0=No drift, limb holds 90 (or 45) degrees for full 10 seconds   5b. Motor right arm: 0=No drift, limb holds 90 (or 45) degrees for full 10 seconds   6a. motor left le=No drift, limb holds 90 (or 45) degrees for full 10 seconds   6b  Motor right le=No drift, limb holds 90 (or 45) degrees for full 10 seconds   7. Limb Ataxia: 0=Absent   8. Sensory: 0=Normal; no sensory loss   9. Best Language:  0=No aphasia, normal   10. Dysarthria: 0=Normal   11. Extinction and Inattention: 0=No abnormality   12. Distal motor function: 0=Normal    Total:  1: Chronic changes only. LABS  I have reviewed all labs for this visit.    Results for orders placed or performed during the hospital encounter of 10/23/20   CBC Auto Differential   Result Value Ref Range    WBC 6.4 4.0 - 11.0 K/uL    RBC 4.62 4.20 - 5.90 M/uL    Hemoglobin 14.0 13.5 - 17.5 g/dL    Hematocrit 41.0 40.5 - 52.5 %    MCV 88.7 80.0 - 100.0 fL    MCH 30.2 26.0 - 34.0 pg    MCHC 34.1 31.0 - 36.0 g/dL    RDW 13.7 12.4 - 15.4 %    Platelets 078 336 - 000 K/uL    MPV 7.5 5.0 - 10.5 fL    Neutrophils % 69.2 %    Lymphocytes % 23.5 %    Monocytes % 6.3 %    Eosinophils % 0.3 %    Basophils % 0.7 %    Neutrophils Absolute 4.4 1.7 - 7.7 K/uL    Lymphocytes Absolute 1.5 1.0 - 5.1 K/uL    Monocytes Absolute 0.4 0.0 - 1.3 K/uL    Eosinophils Absolute 0.0 0.0 - 0.6 K/uL    Basophils Absolute 0.0 0.0 - 0.2 K/uL   Troponin   Result Value Ref Range    Troponin <0.01 <0.01 ng/mL   Comprehensive Metabolic Panel w/ Reflex to MG   Result Value Ref Range    Sodium 136 136 - 145 mmol/L    Potassium reflex Magnesium 4.1 3.5 - 5.1 mmol/L    Chloride 100 99 - 110 mmol/L    CO2 26 21 - 32 mmol/L    Anion Gap 10 3 - 16    Glucose 117 (H) 70 - 99 mg/dL    BUN 13 7 - 20 mg/dL    CREATININE 0.8 (L) 0.9 - 1.3 mg/dL    GFR Non-African American >60 >60    GFR African American >60 >60    Calcium 9.5 8.3 - 10.6 mg/dL    Total Protein 7.2 6.4 - 8.2 g/dL    Alb 4.7 3.4 - 5.0 g/dL    Albumin/Globulin Ratio 1.9 1.1 - 2.2    Total Bilirubin 0.6 0.0 - 1.0 mg/dL    Alkaline Phosphatase 60 40 - 129 U/L    ALT 35 10 - 40 U/L    AST 18 15 - 37 U/L    Globulin 2.5 g/dL   Protime-INR   Result Value Ref Range    Protime 12.4 10.0 - 13.2 sec    INR 1.07 0.86 - 1.14   EKG 12 Lead   Result Value Ref Range    Ventricular Rate 67 BPM    Atrial Rate 67 BPM    P-R Interval 176 ms    QRS Duration 96 ms    Q-T Interval 390 ms    QTc Calculation (Bazett) 412 ms    P Axis 52 degrees    R Axis -18 degrees    T Axis 20 degrees    Diagnosis       Normal sinus rhythmNormal ECGWhen compared with ECG of 09-MAY-2019 08:07,T wave inversion now evident in Inferior leads       EKG Interpretation  Normal sinus rhythm with a rate of 67. Normal axis. Normal intervals and durations. QTc within normal limits. T waves noted in lead III. No acute signs of ischemia. No change compared to previous EKG on 5/9/2020. Interpreted by emergency department physician        RADIOLOGY    XR CHEST PORTABLE   Final Result   Minimal scar versus atelectasis in the right base. Otherwise negative chest.         CTA HEAD NECK W CONTRAST   Final Result   Unremarkable CTA of the head and neck. CT HEAD WO CONTRAST   Final Result   No acute intracranial abnormality on a background of sequelae from prior   vascular insult in the left posteromedial temporal/occipital region. Critical results were called by Dr. Pelon Wright to [place] [id number] on   10/23/2020 at 11:50 EST. MRI BRAIN WO CONTRAST    (Results Pending)       I discussed the CT imaging results with the radiologist, as noted above.       TIMES:  Last Known Well: Late yesterday evening. Arrival to ED: [id number]  Stroke Team: Case discussed with  Stroke Team, multiple calls were placed to the stroke team concerning patient's symptoms and image reviews. Reason for Delays:  [] Social/Sikh  [] Initial refusal of testing or treatment  [] Care team unable to identify eligibility  [] Hypertension requiring aggressive control with IV medications  [] Further diagnostic evaluation to confirm stroke for patients with hypoglycemia (blood glucose <50), seizures, or major metabolic disorders  [] Management of concomitant emergent/acute conditions such as cardiopulmonary arrest, respiratory failure (requiring intubation)  [] Investigational or experimental protocol for thrombolysis    ED COURSE/MDM  Patient seen and evaluated. Old records reviewed. Labs and imaging reviewed and results discussed. Patient was stable throughout his stay in the emergency department. No focalized neurologic deficits were noted on exam.  I considered atypical headache, CVA, MS, intracranial hemorrhage, and intracranial mass. Patient was stable throughout his stay. .    I unequivocally recommended admission to the patient. Patient states that secondary to Covid hardships and childcare issues that he does not wish to stay at this time. I outlined my concerns of possible CVA and risk of nondiagnosis. Patient understands the risk of nondiagnosis. I discussed this with the stroke team physician. A secondary plan was made for patient to receive an urgent MRI of his brain. If negative, he would proceed home on aspirin and follow-up with PCP/cards for outpatient echocardiogram in the next week. If positive, patient would unequivocally be encouraged to be admitted. I discussed this with patient. He was in agreement to staying in the emergency department to allow for MRI and then following with PCP for outpatient echo if needed. . We thoroughly discussed the history, physical exam, laboratory CRITICAL CARE TIME:  Total critical care time today provided was 35 minutes. This excludes seperately billable procedures and family discussion time. Provided for multiple evaluations and calls to the stroke team for potential intervention with concern for potential decompensation. Plan of care was discussed with ED physician Maurice Sainz for ED obs/MRI as noted above. Erin Dennison,     Comment: Please note this report has been produced using speech recognition software and may contain errors related to that system including errors in grammar, punctuation, and spelling, as well as words and phrases that may be inappropriate. If there are any questions or concerns please feel free to contact the dictating provider for clarification.        Erin Dennison DO  10/23/20 [id number]

## 2020-10-23 NOTE — ED NOTES
Called  Stroke Team @ [phone]  Re: Dizziness   Stroke Team responded @ [phone] [facility] Damiansilvia Lawson  10/23/20 [id number]

## 2020-10-23 NOTE — CONSULTS
Dear MD,     - Pharmacist to change baseline IVF to 0.9 % Sodium chloride per consult order   - No IVF ordered at this time  - Pharmacist will review chart daily and make appropriate changes.     Thank you,  Alexia Camacho, PharmD 10/23/2020  11:46 AM

## 2020-10-24 LAB
EKG ATRIAL RATE: 67 BPM
EKG DIAGNOSIS: NORMAL
EKG P AXIS: 52 DEGREES
EKG P-R INTERVAL: 176 MS
EKG Q-T INTERVAL: 390 MS
EKG QRS DURATION: 96 MS
EKG QTC CALCULATION (BAZETT): 412 MS
EKG R AXIS: -18 DEGREES
EKG T AXIS: 20 DEGREES
EKG VENTRICULAR RATE: 67 BPM

## 2020-10-24 PROCEDURE — 93010 ELECTROCARDIOGRAM REPORT: CPT | Performed by: INTERNAL MEDICINE

## 2020-11-03 ENCOUNTER — VIRTUAL VISIT (OUTPATIENT)
Dept: FAMILY MEDICINE CLINIC | Age: 35
End: 2020-11-03
Payer: COMMERCIAL

## 2020-11-03 PROCEDURE — 99214 OFFICE O/P EST MOD 30 MIN: CPT | Performed by: FAMILY MEDICINE

## 2020-11-03 RX ORDER — ASPIRIN 81 MG/1
81 TABLET ORAL DAILY
Qty: 90 TABLET | Refills: 1
Start: 2020-11-03

## 2020-11-03 NOTE — PROGRESS NOTES
11/3/2020    TELEHEALTH EVALUATION -- Audio/Visual (During Replaced by [facility] public health emergency)    CC: tunnel vision    HPI    Leighton Gutierrez (:  1985) elysia 36 y.o. male has requested an audio/video evaluation for the following concern(s):    Tunnel vision  Occurred on 10/23/20, Normal CXR, normal CTA of head and neck, normal CT of head, normal MRI, now resolved    Elevated BP  Mildly elevated in ED on 10/23/20    Headaches  Hasn't had prior to this, now resolved    Review of Systems   Neurological: Negative for weakness. Constitutional: No fever    All other systems reviewed and negative    Prior to Visit Medications    Medication Sig Taking?  Authorizing Provider   aspirin EC 81 MG EC tablet Take 1 tablet by mouth daily Yes Ariel Sepulveda MD   atorvastatin (LIPITOR) 40 MG tablet Take 1 tablet by mouth nightly Yes Ariel Sepulveda MD        No Known Allergies    Past Medical History:   Diagnosis Date    CVA (cerebral vascular accident) ([address] 2019    no residuals EXCEPT PERIPHERAL RT SIDE VISION    Hernia     History of bone graft     PFO (patent foramen ovale) 2019    PFO closure with 25 mm PFO occluder device    TMJ (dislocation of temporomandibular joint)        Past Surgical History:   Procedure Laterality Date    ANTERIOR CRUCIATE LIGAMENT REPAIR Left 9/10/14    BONE GRAFT      left wrist    CARDIAC SURGERY  2019    PFO  plACED AFTER STROKE    HAND SURGERY      right thumb pins    PRE-MALIGNANT / BENIGN SKIN LESION EXCISION N/A 2020    SEBACEOUS CYST EXCISION, POSTERIOR SCALP performed by Deanna Hernandez MD at [facility] Marital status: Single     Spouse name: Not on file    Number of children: Not on file    Years of education: Not on file    Highest education level: Not on file   Occupational History    Not on file   Social Needs    Financial resource strain: Not on file    Food insecurity     Worry: Not on file Inability: Not on file    Transportation needs     Medical: Not on file     Non-medical: Not on file   Tobacco Use    Smoking status: Former Smoker     Packs/day: 0.33     Types: Cigarettes     Last attempt to quit: 3/11/2019     Years since quittin.6    Smokeless tobacco: Former User    Tobacco comment: quit 3/2019   Substance and Sexual Activity    Alcohol use: Yes     Alcohol/week: 6.0 standard drinks     Types: 6 Cans of beer per week     Comment: 6 drinks per week    Drug use: No     Types: Marijuana     Comment: last used     Sexual activity: Yes     Partners: Female   Lifestyle    Physical activity     Days per week: Not on file     Minutes per session: Not on file    Stress: Not on file   Relationships    Social connections     Talks on phone: Not on file     Gets together: Not on file     Attends Buddhist service: Not on file     Active member of club or organization: Not on file     Attends meetings of clubs or organizations: Not on file     Relationship status: Not on file    Intimate partner violence     Fear of current or ex partner: Not on file     Emotionally abused: Not on file     Physically abused: Not on file     Forced sexual activity: Not on file   Other Topics Concern    Not on file   Social History Narrative    ** Merged History Encounter **             Family History   Problem Relation Age of Onset    Cancer Maternal Grandmother     Cancer Maternal Grandfather     Heart Failure Maternal Grandfather     Cancer Paternal Grandmother     High Blood Pressure Paternal Grandmother         MI    Cancer Paternal Grandfather     Other Paternal Grandfather        PHYSICAL EXAMINATION:    Constitutional:  Appears well-developed and well-nourished. No apparent distress    Mental status:  Alert and awake.  Able to follow commands  Eyes: normal EOM, normal sclera, no discharge visible  HENT: Normocephalic, atraumatic  External Ears: Normal  Neck: No visualized mass Pulmonary/Chest:  Respiratory effort normal. No visualized signs of difficulty breathing or respiratory distress  Musculoskeletal: Normal range of motion of neck  Neurological: No Facial Asymmetry (Cranial nerve 7 motor function) (limited exam to video visit). No gaze palsy  Skin: No significant exanthematous lesions or discoloration noted on facial skin  Psychiatric: Normal affect, no hallucinations    ASSESSMENT/PLAN:  Shania Arellano was seen today for follow-up from hospital.    Diagnoses and all orders for this visit:    Tunnel visual field constriction, unspecified laterality  Undiagnosed with uncertain prognosis, pt given information to neurology in ED, still deciding if he will make an appt    Elevated BP without diagnosis of hypertension  Pt instructed to monitor    Nonintractable headache, unspecified chronicity pattern, unspecified headache type  Now resolved, if these reoccur and he has repeat visuall disturbance it could suggestion migraines    Other orders  -     aspirin EC 81 MG EC tablet; Take 1 tablet by mouth daily    Return in about 6 months (around 5/3/2021). Karen Woodruff (:  1985) elysia 36 y.o. male is being evaluated by a Virtual Visit (video visit) encounter to address concerns as mentioned above. A caregiver was present when appropriate. Due to this being a TeleHealth encounter (During [facility]- public health emergency), evaluation of the following organ systems was limited: Vitals/Constitutional/EENT/Resp/CV/GI//MS/Neuro/Skin/Heme-Lymph-Imm. Pursuant to the emergency declaration under the [facility], [address] [place] [address] authority and the [employer] Act, this Virtual Visit was conducted with patient's (and/or legal guardian's) consent, to reduce the patient's risk of exposure to COVID-19 and provide necessary medical care.   The patient (and/or legal guardian) has also been advised to contact this office for worsening conditions or problems, and seek emergency medical treatment and/or call 911 if deemed necessary. Patient identification was verified at the start of the visit: Yes    Total time spent on this encounter: Not billed by time    Services were provided through a video synchronous discussion virtually to substitute for in-person clinic visit. Patient and provider were located at their individual homes. An  electronic signature was used to authenticate this note.     --Aung Pimentel MD on 11/3/2020 at 11:45 AM

## 2021-01-22 ENCOUNTER — OFFICE VISIT (OUTPATIENT)
Dept: PRIMARY CARE CLINIC | Age: 36
End: 2021-01-22
Payer: COMMERCIAL

## 2021-01-22 DIAGNOSIS — Z11.59 SCREENING FOR VIRAL DISEASE: Primary | ICD-10-CM

## 2021-01-22 PROCEDURE — 99211 OFF/OP EST MAY X REQ PHY/QHP: CPT | Performed by: NURSE PRACTITIONER

## 2021-01-22 NOTE — PROGRESS NOTES
Danette Jacobs received a viral test for COVID-19. They were educated on isolation and quarantine as appropriate. For any symptoms, they were directed to seek care from their PCP, given contact information to establish with a doctor, directed to an urgent care or the emergency room.

## 2021-01-22 NOTE — PATIENT INSTRUCTIONS

## 2021-01-23 LAB — SARS-COV-2, NAA: NOT DETECTED

## 2021-05-11 ENCOUNTER — HOSPITAL ENCOUNTER (OUTPATIENT)
Dept: CARDIOLOGY | Age: 36
Discharge: HOME | End: 2021-05-11
Payer: COMMERCIAL

## 2021-05-11 DIAGNOSIS — Q21.12 PFO (PATENT FORAMEN OVALE): ICD-10-CM

## 2021-05-11 LAB
LV EF: 55 %
LVEF MODALITY: NORMAL

## 2021-05-11 PROCEDURE — 93306 TTE W/DOPPLER COMPLETE: CPT

## 2021-05-12 ENCOUNTER — PATIENT MESSAGE (OUTPATIENT)
Dept: CARDIOLOGY CLINIC | Age: 36
End: 2021-05-12

## 2021-05-18 NOTE — TELEPHONE ENCOUNTER
Discussed with Dr. Carmen Cordova. He states he can further discuss the intrapulmonary shunt with Jackeline Reed. Called Jason and instructed Dr. Carmen Cordova will contact him to discuss. He v/u and was appreciative. Contact information provided to Dr. Carmen Cordova.

## 2021-05-26 ENCOUNTER — TELEPHONE (OUTPATIENT)
Dept: CARDIOLOGY | Age: 36
End: 2021-05-26

## 2021-05-26 NOTE — TELEPHONE ENCOUNTER
Called pt to go over recent echo results and discuss symptoms. Has had a couple episodes of \"tunnel vision\" at work. Has had it mowing lawn too. Has happeend a couple of times. Has peripheral vision at the times, but he can't focus on what's in front of him. He just has a \"bright light\" vision in the middle of the field. Gets a \"shahbaz feeling\" right before losing vision. No headaches. These episodes have happened at least 4-5 times. Also gets random and severe chest pain at times. Very sharp. All the symptoms are totally random. Not related to exertion. Chest pain has been more severe since the stroke though has had it \"all my life. \"    Also, sometimes he can feel vision ABOUT to become abnormal, and then he has \"shaking vision. \"    Stress test was done and was negative. With the vision issue, eating something and drinking makes the symptoms go away. No headaches. No numbness, tingling, weakness. No syncope. No paralysis. No double vision. No speech difficulty. Given neg MRI finding, repeated events of \"bright light\" effecting his vision I do not think this is a stroke. I did urge him to get an ophthalmology appt however, and discussed outpt neuro f/u for them to assess. Discussed neg MRI (for acute stroke) and his echo findings suggesting intrapulm shunt. Images reviewed. No definite interatrial shunt was appreciated. Spoke with pt for almost 24+ mins. He also got a second opinion from [facility] cardiology. Rowan Monday A.  Elizabeth Robertson MD, [facility]

## 2021-08-14 ENCOUNTER — APPOINTMENT (OUTPATIENT)
Dept: GENERAL RADIOLOGY | Age: 36
DRG: 853 | End: 2021-08-14
Payer: COMMERCIAL

## 2021-08-14 ENCOUNTER — HOSPITAL ENCOUNTER (EMERGENCY)
Age: 36
Discharge: HOME | DRG: 853 | End: 2021-08-14
Attending: EMERGENCY MEDICINE
Payer: COMMERCIAL

## 2021-08-14 ENCOUNTER — APPOINTMENT (OUTPATIENT)
Dept: CT IMAGING | Age: 36
DRG: 853 | End: 2021-08-14
Payer: COMMERCIAL

## 2021-08-14 VITALS
DIASTOLIC BLOOD PRESSURE: 76 MMHG | HEART RATE: 102 BPM | HEIGHT: 72 IN | OXYGEN SATURATION: 95 % | BODY MASS INDEX: 23.03 KG/M2 | RESPIRATION RATE: 22 BRPM | TEMPERATURE: 98.3 F | SYSTOLIC BLOOD PRESSURE: 116 MMHG | WEIGHT: 170 LBS

## 2021-08-14 DIAGNOSIS — M54.50 ACUTE LEFT-SIDED LOW BACK PAIN WITHOUT SCIATICA: ICD-10-CM

## 2021-08-14 DIAGNOSIS — J02.0 STREP PHARYNGITIS: Primary | ICD-10-CM

## 2021-08-14 LAB
A/G RATIO: 1 (ref 1.1–2.2)
ALBUMIN SERPL-MCNC: 4 G/DL (ref 3.4–5)
ALP BLD-CCNC: 103 U/L (ref 40–129)
ALT SERPL-CCNC: 21 U/L (ref 10–40)
AMORPHOUS: ABNORMAL /HPF
ANION GAP SERPL CALCULATED.3IONS-SCNC: 13 MMOL/L (ref 3–16)
AST SERPL-CCNC: 19 U/L (ref 15–37)
BACTERIA: ABNORMAL /HPF
BASOPHILS ABSOLUTE: 0 K/UL (ref 0–0.2)
BASOPHILS RELATIVE PERCENT: 0.1 %
BILIRUB SERPL-MCNC: 0.5 MG/DL (ref 0–1)
BILIRUBIN URINE: ABNORMAL
BLOOD, URINE: ABNORMAL
BUN BLDV-MCNC: 17 MG/DL (ref 7–20)
CALCIUM SERPL-MCNC: 9.4 MG/DL (ref 8.3–10.6)
CHLORIDE BLD-SCNC: 96 MMOL/L (ref 99–110)
CLARITY: CLEAR
CO2: 23 MMOL/L (ref 21–32)
COLOR: YELLOW
CREAT SERPL-MCNC: 1.1 MG/DL (ref 0.9–1.3)
EOSINOPHILS ABSOLUTE: 0 K/UL (ref 0–0.6)
EOSINOPHILS RELATIVE PERCENT: 0 %
EPITHELIAL CELLS, UA: ABNORMAL /HPF (ref 0–5)
GFR AFRICAN AMERICAN: >60
GFR NON-AFRICAN AMERICAN: >60
GLOBULIN: 3.9 G/DL
GLUCOSE BLD-MCNC: 127 MG/DL (ref 70–99)
GLUCOSE URINE: NEGATIVE MG/DL
HCT VFR BLD CALC: 44 % (ref 40.5–52.5)
HEMOGLOBIN: 15 G/DL (ref 13.5–17.5)
HYALINE CASTS: ABNORMAL /LPF (ref 0–2)
KETONES, URINE: 40 MG/DL
LACTIC ACID: 1.1 MMOL/L (ref 0.4–2)
LEUKOCYTE ESTERASE, URINE: NEGATIVE
LYMPHOCYTES ABSOLUTE: 0.5 K/UL (ref 1–5.1)
LYMPHOCYTES RELATIVE PERCENT: 3.9 %
MCH RBC QN AUTO: 29.7 PG (ref 26–34)
MCHC RBC AUTO-ENTMCNC: 34 G/DL (ref 31–36)
MCV RBC AUTO: 87.3 FL (ref 80–100)
MICROSCOPIC EXAMINATION: YES
MONOCYTES ABSOLUTE: 0.6 K/UL (ref 0–1.3)
MONOCYTES RELATIVE PERCENT: 5.3 %
MUCUS: ABNORMAL /LPF
NEUTROPHILS ABSOLUTE: 10.8 K/UL (ref 1.7–7.7)
NEUTROPHILS RELATIVE PERCENT: 90.7 %
NITRITE, URINE: NEGATIVE
PDW BLD-RTO: 13 % (ref 12.4–15.4)
PH UA: 6 (ref 5–8)
PLATELET # BLD: 229 K/UL (ref 135–450)
PMV BLD AUTO: 7.6 FL (ref 5–10.5)
POTASSIUM SERPL-SCNC: 4.2 MMOL/L (ref 3.5–5.1)
PROTEIN UA: 100 MG/DL
RBC # BLD: 5.04 M/UL (ref 4.2–5.9)
RBC UA: ABNORMAL /HPF (ref 0–4)
S PYO AG THROAT QL: POSITIVE
SARS-COV-2, NAAT: NOT DETECTED
SODIUM BLD-SCNC: 132 MMOL/L (ref 136–145)
SPECIFIC GRAVITY UA: 1.02 (ref 1–1.03)
TOTAL PROTEIN: 7.9 G/DL (ref 6.4–8.2)
TROPONIN: <0.01 NG/ML
URINE TYPE: ABNORMAL
UROBILINOGEN, URINE: 1 E.U./DL
WBC # BLD: 11.9 K/UL (ref 4–11)
WBC UA: ABNORMAL /HPF (ref 0–5)

## 2021-08-14 PROCEDURE — 96372 THER/PROPH/DIAG INJ SC/IM: CPT

## 2021-08-14 PROCEDURE — 99285 EMERGENCY DEPT VISIT HI MDM: CPT

## 2021-08-14 PROCEDURE — 96375 TX/PRO/DX INJ NEW DRUG ADDON: CPT

## 2021-08-14 PROCEDURE — 83605 ASSAY OF LACTIC ACID: CPT

## 2021-08-14 PROCEDURE — 81001 URINALYSIS AUTO W/SCOPE: CPT

## 2021-08-14 PROCEDURE — 87040 BLOOD CULTURE FOR BACTERIA: CPT

## 2021-08-14 PROCEDURE — 72132 CT LUMBAR SPINE W/DYE: CPT

## 2021-08-14 PROCEDURE — 87635 SARS-COV-2 COVID-19 AMP PRB: CPT

## 2021-08-14 PROCEDURE — 96374 THER/PROPH/DIAG INJ IV PUSH: CPT

## 2021-08-14 PROCEDURE — 96376 TX/PRO/DX INJ SAME DRUG ADON: CPT

## 2021-08-14 PROCEDURE — 80053 COMPREHEN METABOLIC PANEL: CPT

## 2021-08-14 PROCEDURE — 87880 STREP A ASSAY W/OPTIC: CPT

## 2021-08-14 PROCEDURE — 2580000003 HC RX 258: Performed by: EMERGENCY MEDICINE

## 2021-08-14 PROCEDURE — 71046 X-RAY EXAM CHEST 2 VIEWS: CPT

## 2021-08-14 PROCEDURE — 87150 DNA/RNA AMPLIFIED PROBE: CPT

## 2021-08-14 PROCEDURE — 72100 X-RAY EXAM L-S SPINE 2/3 VWS: CPT

## 2021-08-14 PROCEDURE — 6360000002 HC RX W HCPCS: Performed by: EMERGENCY MEDICINE

## 2021-08-14 PROCEDURE — 6370000000 HC RX 637 (ALT 250 FOR IP): Performed by: EMERGENCY MEDICINE

## 2021-08-14 PROCEDURE — 6360000004 HC RX CONTRAST MEDICATION: Performed by: EMERGENCY MEDICINE

## 2021-08-14 PROCEDURE — 85025 COMPLETE CBC W/AUTO DIFF WBC: CPT

## 2021-08-14 PROCEDURE — 84484 ASSAY OF TROPONIN QUANT: CPT

## 2021-08-14 PROCEDURE — 93005 ELECTROCARDIOGRAM TRACING: CPT | Performed by: EMERGENCY MEDICINE

## 2021-08-14 RX ORDER — ACETAMINOPHEN 500 MG
1000 TABLET ORAL ONCE
Status: COMPLETED | OUTPATIENT
Start: 2021-08-14 | End: 2021-08-14

## 2021-08-14 RX ORDER — ONDANSETRON 2 MG/ML
4 INJECTION INTRAMUSCULAR; INTRAVENOUS ONCE
Status: COMPLETED | OUTPATIENT
Start: 2021-08-14 | End: 2021-08-14

## 2021-08-14 RX ORDER — MORPHINE SULFATE 4 MG/ML
4 INJECTION, SOLUTION INTRAMUSCULAR; INTRAVENOUS ONCE
Status: COMPLETED | OUTPATIENT
Start: 2021-08-14 | End: 2021-08-14

## 2021-08-14 RX ORDER — TRAMADOL HYDROCHLORIDE 50 MG/1
50 TABLET ORAL EVERY 6 HOURS PRN
Qty: 12 TABLET | Refills: 0 | Status: ON HOLD
Start: 2021-08-14 | End: 2021-08-27 | Stop reason: HOSPADM

## 2021-08-14 RX ORDER — 0.9 % SODIUM CHLORIDE 0.9 %
1000 INTRAVENOUS SOLUTION INTRAVENOUS ONCE
Status: COMPLETED | OUTPATIENT
Start: 2021-08-14 | End: 2021-08-14

## 2021-08-14 RX ADMIN — SODIUM CHLORIDE 1000 ML: 9 INJECTION, SOLUTION INTRAVENOUS at 20:27

## 2021-08-14 RX ADMIN — MORPHINE SULFATE 4 MG: 4 INJECTION, SOLUTION INTRAMUSCULAR; INTRAVENOUS at 18:40

## 2021-08-14 RX ADMIN — SODIUM CHLORIDE 1000 ML: 9 INJECTION, SOLUTION INTRAVENOUS at 18:40

## 2021-08-14 RX ADMIN — IOPAMIDOL 75 ML: 755 INJECTION, SOLUTION INTRAVENOUS at 21:26

## 2021-08-14 RX ADMIN — PENICILLIN G BENZATHINE AND PENICILLIN G PROCAINE 1.2 MILLION UNITS: 600000; 600000 INJECTION, SUSPENSION INTRAMUSCULAR at 20:33

## 2021-08-14 RX ADMIN — MORPHINE SULFATE 4 MG: 4 INJECTION, SOLUTION INTRAMUSCULAR; INTRAVENOUS at 20:28

## 2021-08-14 RX ADMIN — SODIUM CHLORIDE 1000 ML: 9 INJECTION, SOLUTION INTRAVENOUS at 21:35

## 2021-08-14 RX ADMIN — ONDANSETRON 4 MG: 2 INJECTION INTRAMUSCULAR; INTRAVENOUS at 18:41

## 2021-08-14 RX ADMIN — ACETAMINOPHEN 1000 MG: 500 TABLET ORAL at 20:28

## 2021-08-14 ASSESSMENT — PAIN DESCRIPTION - DESCRIPTORS: DESCRIPTORS: ACHING

## 2021-08-14 ASSESSMENT — PAIN SCALES - GENERAL
PAINLEVEL_OUTOF10: 9
PAINLEVEL_OUTOF10: 9
PAINLEVEL_OUTOF10: 2
PAINLEVEL_OUTOF10: 9
PAINLEVEL_OUTOF10: 9

## 2021-08-14 ASSESSMENT — ENCOUNTER SYMPTOMS: TACHYPNEA: 1

## 2021-08-15 ENCOUNTER — APPOINTMENT (OUTPATIENT)
Dept: CT IMAGING | Age: 36
DRG: 853 | End: 2021-08-15
Payer: COMMERCIAL

## 2021-08-15 ENCOUNTER — HOSPITAL ENCOUNTER (INPATIENT)
Age: 36
LOS: 13 days | Discharge: HOME | DRG: 853 | End: 2021-08-28
Attending: EMERGENCY MEDICINE | Admitting: INTERNAL MEDICINE
Payer: COMMERCIAL

## 2021-08-15 DIAGNOSIS — B95.5 STREPTOCOCCAL BACTEREMIA: Primary | ICD-10-CM

## 2021-08-15 DIAGNOSIS — G89.18 CHEST WALL PAIN FOLLOWING SURGERY: ICD-10-CM

## 2021-08-15 DIAGNOSIS — R78.81 STREPTOCOCCAL BACTEREMIA: Primary | ICD-10-CM

## 2021-08-15 DIAGNOSIS — R07.89 CHEST WALL PAIN FOLLOWING SURGERY: ICD-10-CM

## 2021-08-15 LAB
A/G RATIO: 1 (ref 1.1–2.2)
ALBUMIN SERPL-MCNC: 3.8 G/DL (ref 3.4–5)
ALP BLD-CCNC: 136 U/L (ref 40–129)
ALT SERPL-CCNC: 50 U/L (ref 10–40)
ANION GAP SERPL CALCULATED.3IONS-SCNC: 13 MMOL/L (ref 3–16)
AST SERPL-CCNC: 62 U/L (ref 15–37)
BASOPHILS ABSOLUTE: 0 K/UL (ref 0–0.2)
BASOPHILS RELATIVE PERCENT: 0.3 %
BILIRUB SERPL-MCNC: 0.8 MG/DL (ref 0–1)
BUN BLDV-MCNC: 11 MG/DL (ref 7–20)
CALCIUM SERPL-MCNC: 9.3 MG/DL (ref 8.3–10.6)
CHLORIDE BLD-SCNC: 97 MMOL/L (ref 99–110)
CO2: 23 MMOL/L (ref 21–32)
CREAT SERPL-MCNC: 0.9 MG/DL (ref 0.9–1.3)
EOSINOPHILS ABSOLUTE: 0 K/UL (ref 0–0.6)
EOSINOPHILS RELATIVE PERCENT: 0 %
GFR AFRICAN AMERICAN: >60
GFR NON-AFRICAN AMERICAN: >60
GLOBULIN: 3.7 G/DL
GLUCOSE BLD-MCNC: 121 MG/DL (ref 70–99)
HCT VFR BLD CALC: 40.4 % (ref 40.5–52.5)
HEMOGLOBIN: 14.1 G/DL (ref 13.5–17.5)
LACTIC ACID, SEPSIS: 1.7 MMOL/L (ref 0.4–1.9)
LYMPHOCYTES ABSOLUTE: 0.7 K/UL (ref 1–5.1)
LYMPHOCYTES RELATIVE PERCENT: 10.7 %
MCH RBC QN AUTO: 30.3 PG (ref 26–34)
MCHC RBC AUTO-ENTMCNC: 34.9 G/DL (ref 31–36)
MCV RBC AUTO: 86.7 FL (ref 80–100)
MONOCYTES ABSOLUTE: 0.5 K/UL (ref 0–1.3)
MONOCYTES RELATIVE PERCENT: 8.1 %
NEUTROPHILS ABSOLUTE: 5.5 K/UL (ref 1.7–7.7)
NEUTROPHILS RELATIVE PERCENT: 80.9 %
PDW BLD-RTO: 13.5 % (ref 12.4–15.4)
PLATELET # BLD: 228 K/UL (ref 135–450)
PMV BLD AUTO: 7.6 FL (ref 5–10.5)
POTASSIUM REFLEX MAGNESIUM: 3.7 MMOL/L (ref 3.5–5.1)
RBC # BLD: 4.66 M/UL (ref 4.2–5.9)
REPORT: NORMAL
SODIUM BLD-SCNC: 133 MMOL/L (ref 136–145)
TOTAL PROTEIN: 7.5 G/DL (ref 6.4–8.2)
WBC # BLD: 6.8 K/UL (ref 4–11)

## 2021-08-15 PROCEDURE — 96365 THER/PROPH/DIAG IV INF INIT: CPT

## 2021-08-15 PROCEDURE — 6360000002 HC RX W HCPCS: Performed by: EMERGENCY MEDICINE

## 2021-08-15 PROCEDURE — 6370000000 HC RX 637 (ALT 250 FOR IP): Performed by: EMERGENCY MEDICINE

## 2021-08-15 PROCEDURE — 1200000000 HC SEMI PRIVATE

## 2021-08-15 PROCEDURE — 96366 THER/PROPH/DIAG IV INF ADDON: CPT

## 2021-08-15 PROCEDURE — 83605 ASSAY OF LACTIC ACID: CPT

## 2021-08-15 PROCEDURE — 93005 ELECTROCARDIOGRAM TRACING: CPT | Performed by: EMERGENCY MEDICINE

## 2021-08-15 PROCEDURE — 6360000004 HC RX CONTRAST MEDICATION: Performed by: EMERGENCY MEDICINE

## 2021-08-15 PROCEDURE — 6360000002 HC RX W HCPCS

## 2021-08-15 PROCEDURE — 80053 COMPREHEN METABOLIC PANEL: CPT

## 2021-08-15 PROCEDURE — 96375 TX/PRO/DX INJ NEW DRUG ADDON: CPT

## 2021-08-15 PROCEDURE — 2580000003 HC RX 258: Performed by: EMERGENCY MEDICINE

## 2021-08-15 PROCEDURE — 71260 CT THORAX DX C+: CPT

## 2021-08-15 PROCEDURE — 99284 EMERGENCY DEPT VISIT MOD MDM: CPT

## 2021-08-15 PROCEDURE — 85025 COMPLETE CBC W/AUTO DIFF WBC: CPT

## 2021-08-15 RX ORDER — MORPHINE SULFATE 4 MG/ML
4 INJECTION, SOLUTION INTRAMUSCULAR; INTRAVENOUS ONCE
Status: COMPLETED | OUTPATIENT
Start: 2021-08-15 | End: 2021-08-15

## 2021-08-15 RX ORDER — 0.9 % SODIUM CHLORIDE 0.9 %
30 INTRAVENOUS SOLUTION INTRAVENOUS ONCE
Status: COMPLETED | OUTPATIENT
Start: 2021-08-15 | End: 2021-08-16

## 2021-08-15 RX ORDER — MORPHINE SULFATE 4 MG/ML
INJECTION, SOLUTION INTRAMUSCULAR; INTRAVENOUS
Status: COMPLETED
Start: 2021-08-15 | End: 2021-08-15

## 2021-08-15 RX ORDER — ACETAMINOPHEN 500 MG
1000 TABLET ORAL ONCE
Status: COMPLETED | OUTPATIENT
Start: 2021-08-15 | End: 2021-08-15

## 2021-08-15 RX ADMIN — ACETAMINOPHEN 1000 MG: 500 TABLET ORAL at 21:11

## 2021-08-15 RX ADMIN — SODIUM CHLORIDE 2313 ML: 9 INJECTION, SOLUTION INTRAVENOUS at 21:13

## 2021-08-15 RX ADMIN — IOPAMIDOL 75 ML: 755 INJECTION, SOLUTION INTRAVENOUS at 22:11

## 2021-08-15 RX ADMIN — MORPHINE SULFATE 4 MG: 4 INJECTION, SOLUTION INTRAMUSCULAR; INTRAVENOUS at 21:10

## 2021-08-15 RX ADMIN — VANCOMYCIN HYDROCHLORIDE 1500 MG: 10 INJECTION, POWDER, LYOPHILIZED, FOR SOLUTION INTRAVENOUS at 21:16

## 2021-08-15 ASSESSMENT — ENCOUNTER SYMPTOMS
SORE THROAT: 1
SHORTNESS OF BREATH: 0
BACK PAIN: 0
ABDOMINAL PAIN: 0
VOMITING: 0
EYE DISCHARGE: 0
NAUSEA: 0

## 2021-08-15 ASSESSMENT — PAIN DESCRIPTION - LOCATION: LOCATION: BACK

## 2021-08-15 ASSESSMENT — PAIN SCALES - GENERAL
PAINLEVEL_OUTOF10: 10
PAINLEVEL_OUTOF10: 10

## 2021-08-15 ASSESSMENT — PAIN DESCRIPTION - ORIENTATION: ORIENTATION: LEFT

## 2021-08-15 ASSESSMENT — PAIN DESCRIPTION - PAIN TYPE: TYPE: ACUTE PAIN

## 2021-08-15 NOTE — ED NOTES
Cultures reviewed, x 2 positive blood cultures Group A strep. Dr Pascual Chopra looked at patient's chart and suggested patient return to the ED for further txt. Spoke with patient who stated he would return this evening or tomorrow morning. Pt remains symptomatic with fevers, sore throat, and chills.       Erich Naylor RN  08/15/21 0603

## 2021-08-15 NOTE — ED PROVIDER NOTES
[facility] Emergency Department      CHIEF COMPLAINT  Other (Pt reports generalized fatigue, SOB, fever, sore throat, and multiple other sympotms. pt reports that he was rapid swabbed yesterday and negative. )      HISTORY OF PRESENT ILLNESS  Rocío Iyer is a 30 y.o. male with  H/o CVA with loss of peripheral vision on left and PFO closure presents with 3 days of fevers, sore throat, headaches and body aches. He has also had left lower back pain. He went to urgent care and had a rapid covid test but was told it was negative. He has not been eating or drinking much because his throat is so sore. No CP or SOB. No cough. He is healthy, no h/o IVDA. No weakness or numbness of extremities. Nausea but no vomiting. Having normal BMs, no abd pain. No other complaints, modifying factors or associated symptoms. I have reviewed the following from the nursing documentation.     Past Medical History:   Diagnosis Date    CVA (cerebral vascular accident) ([facility] [address] 2019    no residuals EXCEPT PERIPHERAL RT SIDE VISION    Hernia     History of bone graft     PFO (patent foramen ovale) 05/09/2019    PFO closure with 25 mm PFO occluder device    TMJ (dislocation of temporomandibular joint)      Past Surgical History:   Procedure Laterality Date    ANTERIOR CRUCIATE LIGAMENT REPAIR Left 9/10/14    BONE GRAFT      left wrist    CARDIAC SURGERY  2019    PFO  plACED AFTER STROKE    HAND SURGERY      right thumb pins    PRE-MALIGNANT / BENIGN SKIN LESION EXCISION N/A 9/18/2020    SEBACEOUS CYST EXCISION, POSTERIOR SCALP performed by Anushka Bolden MD at [address]     Family History   Problem Relation Age of Onset    Cancer Maternal Grandmother     Cancer Maternal Grandfather     Heart Failure Maternal Grandfather     Cancer Paternal Grandmother     High Blood Pressure Paternal Grandmother         MI    Cancer Paternal Grandfather     Other Paternal Grandfather      Social History     Socioeconomic History    Marital status: Single     Spouse name: Not on file    Number of children: Not on file    Years of education: Not on file    Highest education level: Not on file   Occupational History    Not on file   Tobacco Use    Smoking status: Former Smoker     Packs/day: 0.33     Types: Cigarettes     Quit date: 3/11/2019     Years since quittin.4    Smokeless tobacco: Former User    Tobacco comment: quit 3/2019   Vaping Use    Vaping Use: Never used   Substance and Sexual Activity    Alcohol use: Yes     Alcohol/week: 6.0 standard drinks     Types: 6 Cans of beer per week     Comment: 6 drinks per week    Drug use: No     Types: Marijuana     Comment: last used     Sexual activity: Yes     Partners: Female   Other Topics Concern    Not on file   Social History Narrative    ** Merged History Encounter **          Social Determinants of Health     Financial Resource Strain:     Difficulty of Paying Living Expenses:    Food Insecurity:     Worried About Running Out of Food in the Last Year:     Ran Out of Food in the Last Year:    Transportation Needs:     Lack of Transportation (Medical):  Lack of Transportation (Non-Medical):    Physical Activity:     Days of Exercise per Week:     Minutes of Exercise per Session:    Stress:     Feeling of Stress :    Social Connections:     Frequency of Communication with Friends and Family:     Frequency of Social Gatherings with Friends and Family:     Attends [employer] Services:     Active Member of Clubs or Organizations:     Attends Club or Organization Meetings:     Marital Status:    Intimate Partner Violence:     Fear of Current or Ex-Partner:     Emotionally Abused:     Physically Abused:     Sexually Abused:      No current facility-administered medications for this encounter.      Current Outpatient Medications   Medication Sig Dispense Refill    traMADol (ULTRAM) 50 MG tablet Take 1 tablet by mouth every 6 hours as needed for Pain for up to 3 days. Intended supply: 7 days. Take lowest dose possible to manage pain 12 tablet 0    aspirin EC 81 MG EC tablet Take 1 tablet by mouth daily 90 tablet 1    atorvastatin (LIPITOR) 40 MG tablet Take 1 tablet by mouth nightly 90 tablet 3     No Known Allergies    REVIEW OF SYSTEMS  10 systems reviewed, pertinent positives per HPI otherwise noted to be negative. PHYSICAL EXAM  /76   Pulse 102   Temp 98.3 °F (36.8 °C) (Oral)   Resp 22   Ht 6' (1.829 m)   Wt 170 lb (77.1 kg)   SpO2 95%   BMI 23.06 kg/m²   GENERAL APPEARANCE: Awake and alert. Cooperative. No acute distress. HEAD: Normocephalic. Atraumatic. EYES: PERRL. EOM's grossly intact. ENT: Posterior pharynx is erythematous with bilateral exudate and tonsillar enlargement. No asymmetrical tonsillar swelling. NO trismus, drooling or stridor. NECK: Supple, trachea midline. No meningismus. HEART: Tachy. No murmur. LUNGS: Respirations unlabored. CTAB. Good air exchange. No wheezes, rales, or rhonchi. Speaking comfortably in full sentences. ABDOMEN: Soft. Non-distended. Non-tender. No guarding or rebound. EXTREMITIES: No peripheral edema. MAEE. No acute deformities. SKIN: Warm, dry and intact. No acute rashes. NO midline spine ttp in C, T or L spine. NO rash on the back. NEUROLOGICAL: Alert and oriented X 3. CN II-XII grossly intact. Strength 5/5, sensation intact. Normal coordination. Steady gait. PSYCHIATRIC: Normal mood and affect. LABS  I have reviewed all labs for this visit.    Results for orders placed or performed during the hospital encounter of 08/14/21   COVID-19, Rapid    Specimen: Nasopharyngeal Swab; Throat   Result Value Ref Range    SARS-CoV-2, NAAT Not Detected Not Detected   Strep screen group a throat    Specimen: Throat   Result Value Ref Range    Rapid Strep A Screen POSITIVE (A) Negative   Culture, Blood 1    Specimen: Blood   Result Value Ref Range    Blood Culture, Routine (A)      Gram stain Anaerobic bottle:  Gram positive cocci in chains and/or pairs  resembling Streptococcus  Information to follow  Gram stain Aerobic bottle:  Gram positive cocci in chains and/or pairs  resembling Streptococcus  Information to follow      Organism Streptococcus pyogenes ([facility] Gr A) DNA Detected (A)     Blood Culture, Routine       See additional report for complete BCID panel. Susceptibility testing of penicillin and other beta lactams is  not necessary for beta hemolytic Streptococci since resistant  strains have not been identified.  (CLSI M100)     Culture, Blood, PCR ID Panel Results Report   Result Value Ref Range    Report SEE IMAGE    CBC Auto Differential   Result Value Ref Range    WBC 11.9 (H) 4.0 - 11.0 K/uL    RBC 5.04 4.20 - 5.90 M/uL    Hemoglobin 15.0 13.5 - 17.5 g/dL    Hematocrit 44.0 40.5 - 52.5 %    MCV 87.3 80.0 - 100.0 fL    MCH 29.7 26.0 - 34.0 pg    MCHC 34.0 31.0 - 36.0 g/dL    RDW 13.0 12.4 - 15.4 %    Platelets 534 508 - 510 K/uL    MPV 7.6 5.0 - 10.5 fL    Neutrophils % 90.7 %    Lymphocytes % 3.9 %    Monocytes % 5.3 %    Eosinophils % 0.0 %    Basophils % 0.1 %    Neutrophils Absolute 10.8 (H) 1.7 - 7.7 K/uL    Lymphocytes Absolute 0.5 (L) 1.0 - 5.1 K/uL    Monocytes Absolute 0.6 0.0 - 1.3 K/uL    Eosinophils Absolute 0.0 0.0 - 0.6 K/uL    Basophils Absolute 0.0 0.0 - 0.2 K/uL   Comprehensive Metabolic Panel   Result Value Ref Range    Sodium 132 (L) 136 - 145 mmol/L    Potassium 4.2 3.5 - 5.1 mmol/L    Chloride 96 (L) 99 - 110 mmol/L    CO2 23 21 - 32 mmol/L    Anion Gap 13 3 - 16    Glucose 127 (H) 70 - 99 mg/dL    BUN 17 7 - 20 mg/dL    CREATININE 1.1 0.9 - 1.3 mg/dL    GFR Non-African American >60 >60    GFR African American >60 >60    Calcium 9.4 8.3 - 10.6 mg/dL    Total Protein 7.9 6.4 - 8.2 g/dL    Albumin 4.0 3.4 - 5.0 g/dL    Albumin/Globulin Ratio 1.0 (L) 1.1 - 2.2    Total Bilirubin 0.5 0.0 - 1.0 mg/dL    Alkaline Phosphatase 103 40 - 129 U/L    ALT 21 10 - 40 U/L    AST 19 15 - 37 U/L    Globulin 3.9 g/dL   Lactic Acid, Plasma   Result Value Ref Range    Lactic Acid 1.1 0.4 - 2.0 mmol/L   Urinalysis   Result Value Ref Range    Color, UA Yellow Straw/Yellow    Clarity, UA Clear Clear    Glucose, Ur Negative Negative mg/dL    Bilirubin Urine SMALL (A) Negative    Ketones, Urine 40 (A) Negative mg/dL    Specific Gravity, UA 1.025 1.005 - 1.030    Blood, Urine MODERATE (A) Negative    pH, UA 6.0 5.0 - 8.0    Protein,  (A) Negative mg/dL    Urobilinogen, Urine 1.0 <2.0 E.U./dL    Nitrite, Urine Negative Negative    Leukocyte Esterase, Urine Negative Negative    Microscopic Examination YES     Urine Type NotGiven    Troponin   Result Value Ref Range    Troponin <0.01 <0.01 ng/mL   Microscopic Urinalysis   Result Value Ref Range    Hyaline Casts, UA 0-2 0 - 2 /LPF    Mucus, UA Rare (A) None Seen /LPF    WBC, UA 6-9 (A) 0 - 5 /HPF    RBC, UA 11-20 (A) 0 - 4 /HPF    Epithelial Cells, UA 2-5 0 - 5 /HPF    Bacteria, UA 3+ (A) None Seen /HPF    Amorphous, UA 2+ /HPF   EKG 12 Lead   Result Value Ref Range    Ventricular Rate 127 BPM    Atrial Rate 127 BPM    P-R Interval 142 ms    QRS Duration 82 ms    Q-T Interval 288 ms    QTc Calculation (Bazett) 418 ms    P Axis 50 degrees    R Axis -34 degrees    T Axis 44 degrees    Diagnosis       Sinus tachycardiaPossible Left atrial enlargementLeft axis deviationAbnormal ECGWhen compared with ECG of 23-OCT-2020 12:25,Vent. rate has increased BY  60 BPM       EKG  The Ekg interpreted by myself  sinus tachycardia, bgll=918 with a rate of 127  Axis is   Left axis deviation  QTc is  normal  Intervals and Durations are unremarkable. No specific ST-T wave changes appreciated. No evidence of acute ischemia. Sinus tachycardia has replaced normal sinus rhythm when compared to the EKG from October 23, 2020    Cardiac Monitoring: The cardiac monitor revealed sinus tachycardia as interpreted by me.  The cardiac monitor was ordered secondary to the patient's complaint of fever and body aches and to monitor the patient for dysrhythmia. RADIOLOGY  X-RAYS:  I have reviewed radiologic plain film image(s). ALL OTHER NON-PLAIN FILM IMAGES SUCH AS CT, ULTRASOUND AND MRI HAVE BEEN READ BY THE RADIOLOGIST. CT LUMBAR SPINE W CONTRAST   Final Result   Unremarkable contrast enhanced CT of the lumbosacral spine. XR LUMBAR SPINE (2-3 VIEWS)   Final Result   No acute vertebral body or disc space abnormality. XR CHEST (2 VW)   Final Result   No acute cardiopulmonary process. Rechecks: Physical assessment performed. After 2 L IVF, HR has normalized to 100 from 142. He is feeling much better. ED COURSE/MDM  Patient seen and evaluated. Here the patient is afebrile, but reports fevers at home. He did spike a small temp here. Tachy to 140's on arrival, no hypoxia or hypotension. Has exudative tonsillitis on exam. NO meningismus. Lungs clear, benign abd exam. He does also reports lower back pain. No midline ttp. No risk factors to make me suspect discitis or epidural abscess or hematoma. It is atraumatic back pain though. Covid is neg. Labs with leukocytosis, lactic normal. Strep is positive. Given 2 L IVF and IM Bicillin here. CXR wnl. CT L spine done given the low back pain and is wnl. If I did not have a source for the fever and tachycardia, I would investigate the back pain further, but he clinically and based on labs has strep throat. His HR normalized after IVF and he is feeling much better. Blood cultures were sent. I did ask him to have 48 hour f/u with his PCP. If his back pain worsens or he continues to spike fevers he may need further workup and would even consider L spine MRI. He has a normal neuro exam, no weakness or numbness, no red flags to suspect cauda equina syndrome. Old records reviewed. Labs and imaging reviewed and results discussed with patient. Patient was reassessed as noted above .  Plan of care discussed with patient. Patient in agreement with plan. Strict return precautions have been given. Patient was given scripts for the following medications. I counseled patient how to take these medications. Discharge Medication List as of 8/14/2021 11:00 PM      START taking these medications    Details   traMADol (ULTRAM) 50 MG tablet Take 1 tablet by mouth every 6 hours as needed for Pain for up to 3 days. Intended supply: 7 days. Take lowest dose possible to manage pain, Disp-12 tablet, R-0Normal             CLINICAL IMPRESSION  1. Strep pharyngitis    2. Acute left-sided low back pain without sciatica        Blood pressure 116/76, pulse 102, temperature 98.3 °F (36.8 °C), temperature source Oral, resp. rate 22, height 6' (1.829 m), weight 170 lb (77.1 kg), SpO2 95 %. DISPOSITION  Nata Osman was discharged to home in stable condition.     (Please note this note was completed with a voice recognition program.  Efforts were made to edit the dictations but occasionally words are mis-transcribed.)       Pamella Cornell MD  08/15/21 [id number]

## 2021-08-15 NOTE — ED NOTES
Removed PIV, reviewed discharge instructions including when to come back and prescriptions. Pt verbalized understanding.        Leopoldo Cota, RN  08/14/21 [id number]

## 2021-08-16 ENCOUNTER — APPOINTMENT (OUTPATIENT)
Dept: CT IMAGING | Age: 36
DRG: 853 | End: 2021-08-16
Payer: COMMERCIAL

## 2021-08-16 PROBLEM — Z87.74 S/P PATENT FORAMEN OVALE CLOSURE: Status: ACTIVE | Noted: 2021-08-16

## 2021-08-16 PROBLEM — J02.0 PHARYNGITIS DUE TO STREPTOCOCCUS SPECIES: Status: ACTIVE | Noted: 2021-08-16

## 2021-08-16 LAB
ANION GAP SERPL CALCULATED.3IONS-SCNC: 9 MMOL/L (ref 3–16)
BASOPHILS ABSOLUTE: 0 K/UL (ref 0–0.2)
BASOPHILS RELATIVE PERCENT: 0.4 %
BUN BLDV-MCNC: 7 MG/DL (ref 7–20)
CALCIUM SERPL-MCNC: 8.5 MG/DL (ref 8.3–10.6)
CHLORIDE BLD-SCNC: 104 MMOL/L (ref 99–110)
CO2: 25 MMOL/L (ref 21–32)
CREAT SERPL-MCNC: 0.9 MG/DL (ref 0.9–1.3)
EKG ATRIAL RATE: 127 BPM
EKG ATRIAL RATE: 128 BPM
EKG DIAGNOSIS: NORMAL
EKG DIAGNOSIS: NORMAL
EKG P AXIS: 50 DEGREES
EKG P AXIS: 60 DEGREES
EKG P-R INTERVAL: 142 MS
EKG P-R INTERVAL: 150 MS
EKG Q-T INTERVAL: 280 MS
EKG Q-T INTERVAL: 288 MS
EKG QRS DURATION: 82 MS
EKG QRS DURATION: 82 MS
EKG QTC CALCULATION (BAZETT): 408 MS
EKG QTC CALCULATION (BAZETT): 418 MS
EKG R AXIS: -34 DEGREES
EKG R AXIS: -38 DEGREES
EKG T AXIS: 41 DEGREES
EKG T AXIS: 44 DEGREES
EKG VENTRICULAR RATE: 127 BPM
EKG VENTRICULAR RATE: 128 BPM
EOSINOPHILS ABSOLUTE: 0 K/UL (ref 0–0.6)
EOSINOPHILS RELATIVE PERCENT: 0.2 %
GFR AFRICAN AMERICAN: >60
GFR NON-AFRICAN AMERICAN: >60
GLUCOSE BLD-MCNC: 130 MG/DL (ref 70–99)
HCT VFR BLD CALC: 35.6 % (ref 40.5–52.5)
HEMOGLOBIN: 12.4 G/DL (ref 13.5–17.5)
LYMPHOCYTES ABSOLUTE: 1.5 K/UL (ref 1–5.1)
LYMPHOCYTES RELATIVE PERCENT: 20.4 %
MCH RBC QN AUTO: 30.3 PG (ref 26–34)
MCHC RBC AUTO-ENTMCNC: 35 G/DL (ref 31–36)
MCV RBC AUTO: 86.8 FL (ref 80–100)
MONOCYTES ABSOLUTE: 0.8 K/UL (ref 0–1.3)
MONOCYTES RELATIVE PERCENT: 10.2 %
NEUTROPHILS ABSOLUTE: 5.1 K/UL (ref 1.7–7.7)
NEUTROPHILS RELATIVE PERCENT: 68.8 %
PDW BLD-RTO: 13.3 % (ref 12.4–15.4)
PLATELET # BLD: 194 K/UL (ref 135–450)
PMV BLD AUTO: 7.4 FL (ref 5–10.5)
POTASSIUM REFLEX MAGNESIUM: 3.6 MMOL/L (ref 3.5–5.1)
RBC # BLD: 4.1 M/UL (ref 4.2–5.9)
SARS-COV-2, NAAT: NOT DETECTED
SARS-COV-2: NOT DETECTED
SODIUM BLD-SCNC: 138 MMOL/L (ref 136–145)
WBC # BLD: 7.5 K/UL (ref 4–11)

## 2021-08-16 PROCEDURE — 80048 BASIC METABOLIC PNL TOTAL CA: CPT

## 2021-08-16 PROCEDURE — 2580000003 HC RX 258: Performed by: INTERNAL MEDICINE

## 2021-08-16 PROCEDURE — U0005 INFEC AGEN DETEC AMPLI PROBE: HCPCS

## 2021-08-16 PROCEDURE — 6360000002 HC RX W HCPCS: Performed by: INTERNAL MEDICINE

## 2021-08-16 PROCEDURE — 93010 ELECTROCARDIOGRAM REPORT: CPT | Performed by: INTERNAL MEDICINE

## 2021-08-16 PROCEDURE — 85025 COMPLETE CBC W/AUTO DIFF WBC: CPT

## 2021-08-16 PROCEDURE — 1200000000 HC SEMI PRIVATE

## 2021-08-16 PROCEDURE — 87635 SARS-COV-2 COVID-19 AMP PRB: CPT

## 2021-08-16 PROCEDURE — 6370000000 HC RX 637 (ALT 250 FOR IP): Performed by: NURSE PRACTITIONER

## 2021-08-16 PROCEDURE — U0003 INFECTIOUS AGENT DETECTION BY NUCLEIC ACID (DNA OR RNA); SEVERE ACUTE RESPIRATORY SYNDROME CORONAVIRUS 2 (SARS-COV-2) (CORONAVIRUS DISEASE [COVID-19]), AMPLIFIED PROBE TECHNIQUE, MAKING USE OF HIGH THROUGHPUT TECHNOLOGIES AS DESCRIBED BY CMS-2020-01-R: HCPCS

## 2021-08-16 PROCEDURE — 6370000000 HC RX 637 (ALT 250 FOR IP): Performed by: INTERNAL MEDICINE

## 2021-08-16 PROCEDURE — 99254 IP/OBS CNSLTJ NEW/EST MOD 60: CPT | Performed by: INTERNAL MEDICINE

## 2021-08-16 PROCEDURE — 6360000002 HC RX W HCPCS: Performed by: NURSE PRACTITIONER

## 2021-08-16 PROCEDURE — 70490 CT SOFT TISSUE NECK W/O DYE: CPT

## 2021-08-16 RX ORDER — ASPIRIN 81 MG/1
81 TABLET ORAL DAILY
Status: DISCONTINUED | OUTPATIENT
Start: 2021-08-16 | End: 2021-08-24

## 2021-08-16 RX ORDER — NAPROXEN 250 MG/1
500 TABLET ORAL ONCE
Status: COMPLETED | OUTPATIENT
Start: 2021-08-16 | End: 2021-08-16

## 2021-08-16 RX ORDER — MORPHINE SULFATE 4 MG/ML
4 INJECTION, SOLUTION INTRAMUSCULAR; INTRAVENOUS EVERY 4 HOURS PRN
Status: DISCONTINUED | OUTPATIENT
Start: 2021-08-16 | End: 2021-08-17

## 2021-08-16 RX ORDER — ATORVASTATIN CALCIUM 10 MG/1
40 TABLET, FILM COATED ORAL NIGHTLY
Status: DISCONTINUED | OUTPATIENT
Start: 2021-08-16 | End: 2021-08-24

## 2021-08-16 RX ORDER — SODIUM CHLORIDE 0.9 % (FLUSH) 0.9 %
5-40 SYRINGE (ML) INJECTION EVERY 12 HOURS SCHEDULED
Status: DISCONTINUED | OUTPATIENT
Start: 2021-08-16 | End: 2021-08-24

## 2021-08-16 RX ORDER — ONDANSETRON 2 MG/ML
4 INJECTION INTRAMUSCULAR; INTRAVENOUS EVERY 6 HOURS PRN
Status: DISCONTINUED | OUTPATIENT
Start: 2021-08-16 | End: 2021-08-24

## 2021-08-16 RX ORDER — KETOROLAC TROMETHAMINE 30 MG/ML
30 INJECTION, SOLUTION INTRAMUSCULAR; INTRAVENOUS EVERY 6 HOURS PRN
Status: COMPLETED | OUTPATIENT
Start: 2021-08-16 | End: 2021-08-17

## 2021-08-16 RX ORDER — SODIUM CHLORIDE 0.9 % (FLUSH) 0.9 %
5-40 SYRINGE (ML) INJECTION PRN
Status: DISCONTINUED | OUTPATIENT
Start: 2021-08-16 | End: 2021-08-24

## 2021-08-16 RX ORDER — SODIUM CHLORIDE 9 MG/ML
25 INJECTION, SOLUTION INTRAVENOUS PRN
Status: DISCONTINUED | OUTPATIENT
Start: 2021-08-16 | End: 2021-08-24

## 2021-08-16 RX ORDER — ACETAMINOPHEN 650 MG/1
650 SUPPOSITORY RECTAL EVERY 6 HOURS PRN
Status: DISCONTINUED | OUTPATIENT
Start: 2021-08-16 | End: 2021-08-24

## 2021-08-16 RX ORDER — ONDANSETRON 4 MG/1
4 TABLET, ORALLY DISINTEGRATING ORAL EVERY 8 HOURS PRN
Status: DISCONTINUED | OUTPATIENT
Start: 2021-08-16 | End: 2021-08-24

## 2021-08-16 RX ORDER — SODIUM CHLORIDE 9 MG/ML
INJECTION, SOLUTION INTRAVENOUS CONTINUOUS
Status: DISCONTINUED | OUTPATIENT
Start: 2021-08-16 | End: 2021-08-24

## 2021-08-16 RX ORDER — ACETAMINOPHEN 325 MG/1
650 TABLET ORAL EVERY 6 HOURS PRN
Status: DISCONTINUED | OUTPATIENT
Start: 2021-08-16 | End: 2021-08-24

## 2021-08-16 RX ORDER — POLYETHYLENE GLYCOL 3350 17 G/17G
17 POWDER, FOR SOLUTION ORAL DAILY PRN
Status: DISCONTINUED | OUTPATIENT
Start: 2021-08-16 | End: 2021-08-24

## 2021-08-16 RX ADMIN — MORPHINE SULFATE 4 MG: 4 INJECTION, SOLUTION INTRAMUSCULAR; INTRAVENOUS at 11:45

## 2021-08-16 RX ADMIN — ATORVASTATIN CALCIUM 40 MG: 10 TABLET, FILM COATED ORAL at 02:05

## 2021-08-16 RX ADMIN — ACETAMINOPHEN 650 MG: 325 TABLET ORAL at 20:31

## 2021-08-16 RX ADMIN — NAPROXEN 500 MG: 250 TABLET ORAL at 02:05

## 2021-08-16 RX ADMIN — DEXTROSE MONOHYDRATE 4 MILLION UNITS: 5 INJECTION INTRAVENOUS at 20:28

## 2021-08-16 RX ADMIN — MORPHINE SULFATE 4 MG: 4 INJECTION, SOLUTION INTRAMUSCULAR; INTRAVENOUS at 22:55

## 2021-08-16 RX ADMIN — KETOROLAC TROMETHAMINE 30 MG: 30 INJECTION, SOLUTION INTRAMUSCULAR; INTRAVENOUS at 03:19

## 2021-08-16 RX ADMIN — ASPIRIN 81 MG: 81 TABLET, COATED ORAL at 09:48

## 2021-08-16 RX ADMIN — MORPHINE SULFATE 4 MG: 4 INJECTION, SOLUTION INTRAMUSCULAR; INTRAVENOUS at 17:52

## 2021-08-16 RX ADMIN — SODIUM CHLORIDE, PRESERVATIVE FREE 10 ML: 5 INJECTION INTRAVENOUS at 09:54

## 2021-08-16 RX ADMIN — SODIUM CHLORIDE, PRESERVATIVE FREE 10 ML: 5 INJECTION INTRAVENOUS at 20:32

## 2021-08-16 RX ADMIN — ATORVASTATIN CALCIUM 40 MG: 10 TABLET, FILM COATED ORAL at 20:31

## 2021-08-16 RX ADMIN — ENOXAPARIN SODIUM 40 MG: 40 INJECTION SUBCUTANEOUS at 09:48

## 2021-08-16 RX ADMIN — KETOROLAC TROMETHAMINE 30 MG: 30 INJECTION, SOLUTION INTRAMUSCULAR; INTRAVENOUS at 14:25

## 2021-08-16 RX ADMIN — SODIUM CHLORIDE: 9 INJECTION, SOLUTION INTRAVENOUS at 02:10

## 2021-08-16 RX ADMIN — Medication 1 LOZENGE: at 02:14

## 2021-08-16 RX ADMIN — VANCOMYCIN HYDROCHLORIDE 750 MG: 750 INJECTION, POWDER, LYOPHILIZED, FOR SOLUTION INTRAVENOUS at 03:59

## 2021-08-16 ASSESSMENT — PAIN SCALES - GENERAL
PAINLEVEL_OUTOF10: 6
PAINLEVEL_OUTOF10: 0
PAINLEVEL_OUTOF10: 5
PAINLEVEL_OUTOF10: 6
PAINLEVEL_OUTOF10: 10
PAINLEVEL_OUTOF10: 5
PAINLEVEL_OUTOF10: 7
PAINLEVEL_OUTOF10: 3
PAINLEVEL_OUTOF10: 3
PAINLEVEL_OUTOF10: 6
PAINLEVEL_OUTOF10: 5
PAINLEVEL_OUTOF10: 6

## 2021-08-16 ASSESSMENT — PAIN DESCRIPTION - LOCATION: LOCATION: RIB CAGE

## 2021-08-16 ASSESSMENT — PAIN DESCRIPTION - DESCRIPTORS: DESCRIPTORS: ACHING

## 2021-08-16 ASSESSMENT — PAIN DESCRIPTION - PAIN TYPE
TYPE: ACUTE PAIN
TYPE: ACUTE PAIN

## 2021-08-16 ASSESSMENT — PAIN DESCRIPTION - ORIENTATION: ORIENTATION: RIGHT;LEFT

## 2021-08-16 NOTE — ED PROVIDER NOTES
[address] [facility]  ED  EMERGENCY DEPARTMENT ENCOUNTER      Pt Name: Sanford Lim  MRN: [id number]  [place] 1985  Date of evaluation: 8/15/2021  Provider: Cole Lindquist MD    CHIEF COMPLAINT       Chief Complaint   Patient presents with    Other     Pt was called for a positive blood culture result and told to come back to the ED. Pt was seen and dx'd with strep yesterday.  Back Pain         HISTORY OF PRESENT ILLNESS   (Location/Symptom, Timing/Onset,Context/Setting, Quality, Duration, Modifying Factors, Severity)  Note limiting factors. Sanford Lim is a 30 y.o. male who presents to the emergency department for positive blood culture. The patient was seen 1 day prior for sore throat and back pain. The patient had an extensive work-up and ultimately he was discharged home. He was called back today because his blood culture returned positive. The patient states that his sore throat is somewhat improving but now he is having left posterior mid thoracic pain which is worse with inspiration. He is also continued to have fever at home. He has been alternating Tylenol and ibuprofen for his fever. Nursing notes were reviewed. REVIEW OF SYSTEMS    (2-9 systems for level 4, 10 or more for level 5)     Review of Systems   Constitutional: Positive for fever. HENT: Positive for sore throat. Improving   Eyes: Negative for discharge. Respiratory: Negative for shortness of breath. Cardiovascular: Negative for chest pain. Gastrointestinal: Negative for abdominal pain, nausea and vomiting. Genitourinary: Positive for flank pain. Negative for dysuria. Musculoskeletal: Negative for back pain. Skin: Negative for rash. Neurological: Positive for headaches. Hematological: Does not bruise/bleed easily.          PAST MEDICAL HISTORY     Past Medical History:   Diagnosis Date    CVA (cerebral vascular accident) ([facility] [address] 2019    no residuals EXCEPT PERIPHERAL RT SIDE VISION    Hernia     History of bone graft     PFO (patent foramen ovale) 2019    PFO closure with 25 mm PFO occluder device    TMJ (dislocation of temporomandibular joint)          SURGICALHISTORY       Past Surgical History:   Procedure Laterality Date    ANTERIOR CRUCIATE LIGAMENT REPAIR Left 9/10/14    BONE GRAFT      left wrist    CARDIAC SURGERY  2019    PFO  plACED AFTER STROKE    HAND SURGERY      right thumb pins    PRE-MALIGNANT / BENIGN SKIN LESION EXCISION N/A 2020    SEBACEOUS CYST EXCISION, POSTERIOR SCALP performed by Yolanda Morse MD at [address] [place] [address]       Previous Medications    ASPIRIN EC 81 MG EC TABLET    Take 1 tablet by mouth daily    ATORVASTATIN (LIPITOR) 40 MG TABLET    Take 1 tablet by mouth nightly    TRAMADOL (ULTRAM) 50 MG TABLET    Take 1 tablet by mouth every 6 hours as needed for Pain for up to 3 days. Intended supply: 7 days. Take lowest dose possible to manage pain       ALLERGIES     Patient has no known allergies. FAMILY HISTORY       Family History   Problem Relation Age of Onset    Cancer Maternal Grandmother     Cancer Maternal Grandfather     Heart Failure Maternal Grandfather     Cancer Paternal Grandmother     High Blood Pressure Paternal Grandmother         MI    Cancer Paternal Grandfather     Other Paternal Grandfather           SOCIAL HISTORY       Social History     Socioeconomic History    Marital status: Single     Spouse name: None    Number of children: None    Years of education: None    Highest education level: None   Occupational History    None   Tobacco Use    Smoking status: Former Smoker     Packs/day: 0.33     Types: Cigarettes     Quit date: 3/11/2019     Years since quittin.4    Smokeless tobacco: Former User    Tobacco comment: quit 3/2019   Vaping Use    Vaping Use: Never used   Substance and Sexual Activity    Alcohol use:  Yes     Alcohol/week: 6.0 standard drinks     Types: 6 Cans of beer per week     Comment: 6 drinks per week    Drug use: No     Types: Marijuana     Comment: last used 2012    Sexual activity: Yes     Partners: Female   Other Topics Concern    None   Social History Narrative    ** Merged History Encounter **          Social Determinants of Health     Financial Resource Strain:     Difficulty of Paying Living Expenses:    Food Insecurity:     Worried About Running Out of Food in the Last Year:     Ran Out of Food in the Last Year:    Transportation Needs:     Lack of Transportation (Medical):  Lack of Transportation (Non-Medical):    Physical Activity:     Days of Exercise per Week:     Minutes of Exercise per Session:    Stress:     Feeling of Stress :    Social Connections:     Frequency of Communication with Friends and Family:     Frequency of Social Gatherings with Friends and Family:     Attends [employer] Services:     Active Member of Clubs or Organizations:     Attends Club or Organization Meetings:     Marital Status:    Intimate Partner Violence:     Fear of Current or Ex-Partner:     Emotionally Abused:     Physically Abused:     Sexually Abused:        SCREENINGS             PHYSICAL EXAM    (up to 7 for level 4, 8 or more for level 5)     ED Triage Vitals [08/15/21 1820]   BP Temp Temp Source Pulse Resp SpO2 Height Weight   127/78 97.5 °F (36.4 °C) Oral 110 20 98 % 6' (1.829 m) 170 lb (77.1 kg)       Physical Exam  Vitals and nursing note reviewed. Constitutional:       Appearance: Normal appearance. He is well-developed. He is ill-appearing. Comments: In acute painful distress   HENT:      Head: Normocephalic and atraumatic. Right Ear: External ear normal.      Left Ear: External ear normal.      Nose: Nose normal.   Eyes:      General: No scleral icterus. Right eye: No discharge. Left eye: No discharge. Conjunctiva/sclera: Conjunctivae normal.   Cardiovascular:      Rate and Rhythm: Regular rhythm.  Tachycardia present. Heart sounds: Normal heart sounds. Pulmonary:      Effort: Pulmonary effort is normal. No respiratory distress. Breath sounds: Normal breath sounds. No wheezing or rales. Abdominal:      General: Bowel sounds are normal.   Musculoskeletal:      Cervical back: Neck supple. Skin:     Coloration: Skin is not pale. Neurological:      Mental Status: He is alert. Psychiatric:         Mood and Affect: Mood normal.         Behavior: Behavior normal.             DIAGNOSTIC RESULTS     EKG: All EKG's are interpreted by the Emergency Department Physician who either signs or Co-signs this chart in the absence of a cardiologist.    12 lead EKG shows sinus tachycardia rate of 128 bpm, ND interval QRS QTC normal.  Left axis deviation no acute ischemic findings. Besides the rate no significant changes when compared to previous EKG on October 2020. RADIOLOGY:   Non-plain film images such as CT, Ultrasound and MRI are read by the radiologist. Plain radiographic images are visualized and preliminarily interpreted by the emergency physician with the below findings:        Interpretation per the Radiologist below, if available at the time of this note:    CT CHEST PULMONARY EMBOLISM W CONTRAST   Final Result   1. Multifocal airspace disease, including scattered nodular airspace disease. Correlate with presentation. Follow-up to resolution recommended. 2. Suboptimal opacification of the pulmonary arteries. No acute pulmonary   embolism to the lobar arteries given limitation. 3. Other findings as described.                ED BEDSIDE ULTRASOUND:   Performed by ED Physician - none    LABS:  Labs Reviewed   CBC WITH AUTO DIFFERENTIAL - Abnormal; Notable for the following components:       Result Value    Hematocrit 40.4 (*)     Lymphocytes Absolute 0.7 (*)     All other components within normal limits    Narrative:     Performed at:  [facility]  [address] [place] [address], [facility], [address]   Phone [phone]   COMPREHENSIVE METABOLIC PANEL W/ REFLEX TO MG FOR LOW K - Abnormal; Notable for the following components:    Sodium 133 (*)     Chloride 97 (*)     Glucose 121 (*)     Albumin/Globulin Ratio 1.0 (*)     Alkaline Phosphatase 136 (*)     ALT 50 (*)     AST 62 (*)     All other components within normal limits    Narrative:     Performed at:  [address], [address]   Phone [phone], RAPID    Narrative:     Performed at:  [address], [address] [employer]   Phone [phone]   LACTATE, SEPSIS    Narrative:     Performed at:  [address], [facility] [employer]   Phone [phone]   BASIC METABOLIC PANEL W/ REFLEX TO MG FOR LOW K   CBC WITH AUTO DIFFERENTIAL       All other labs were within normal range or not returned as of this dictation. EMERGENCY DEPARTMENT COURSE and DIFFERENTIAL DIAGNOSIS/MDM:   Vitals:    Vitals:    08/15/21 2248 08/15/21 2348 08/16/21 0018 08/16/21 0155   BP: 123/87 124/84 127/88 (!) 128/90   Pulse: 94 89 83 103   Resp: 26 24 25 23   Temp:  98.5 °F (36.9 °C)  99.1 °F (37.3 °C)   TempSrc:  Oral  Oral   SpO2:  94% 98% 100%   Weight:       Height:                   ED Course as of Aug 16 0236   Sun Aug 15, 2021   [age] Adult male who was called in after having had positive blood cultures at home. The patient was seen 1 day ago at that time he was having sore throat and lumbar back pain. Patient's blood culture was positive for Streptococcus. He is febrile he is tachycardic. The patient is given a 30 cc/kg bolus of normal saline and he is started on appropriate antibiotics. The patient is however having a left mid back pain which is pleuritic in nature.   I am concerned for pulmonary embolism or other pulmonary pathology including pneumonia and laboratory studies and a CT PE are therefore ordered. He is given morphine for his pain in addition to Tylenol for his fever.    [TD]   2334 CT PE negative for pulmonary embolism but does show pulmonary nodular findings. Patient will need to be admitted for sepsis. A consult was placed to the hospitalist on duty was agreed. SEP-1 CORE MEASURE DATA    Classification: sepsis    Amount of fluids ordered: at least 30mL/kg based on entered actual weight at time of triage    Time at which sepsis was identified: 2045    Broad-spectrum antibiotics chosen:  based on sepsis order-set for a suspected source of: known strep bacteremia    Repeat lactate level: not indicated    On reassessment after fluid resuscitation: Patient appears more comfortable. His vital signs have improved. [TD]      ED Course User Index  [TD] Malathi Phan MD     I discussed all of our findings with the patient and his father. He is agreeable with the plan for admission. Upon reassessment cardiac monitor is read and interpreted by myself and shows sinus tachycardia however the rate is much lower than when he was previously seen. Although the patient questions and concerns are addressed. CRITICAL CARE TIME   Total Critical Care time was 30 minutes, excluding separately reportable procedures. There was a high probability of clinically significant/life threatening deterioration in the patient's condition which required my urgent intervention. CONSULTS:  IP CONSULT TO HOSPITALIST  IP CONSULT TO PHARMACY  IP CONSULT TO INFECTIOUS DISEASES    PROCEDURES:       Procedures    FINAL IMPRESSION      1. Streptococcal bacteremia          DISPOSITION/PLAN   DISPOSITION Admitted 08/15/2021 11:40:28 PM      PATIENT REFERREDTO:  No follow-up provider specified.     DISCHARGEMEDICATIONS:  New Prescriptions    No medications on file          (Please note that portions of this note were completed with a voice recognition program.  Efforts were made to edit the dictations but occasionally words are mis-transcribed.)    Chyna Talley MD (electronically signed)  Attending Emergency Physician        Chyna Talley MD  08/16/21 6882 (3) no apparent problem

## 2021-08-16 NOTE — ED NOTES
Pt given menu by this writer. Pt sitting up on bed. On phone. Respirations regular. Airway intact. GCS 15.  0 s/s of distress.        Phil Morel, DENTON  08/16/21 9580

## 2021-08-16 NOTE — CONSULTS
Infectious Diseases   Consult Note      Reason for Consult:  GABHS bacteremia    Requesting Physician:  Dr. Ramya Christie       Date of Admission: 8/15/2021  Subjective:   CHIEF COMPLAINT:  None given       HPI:    Radha Barbosa is a 31yoM with history of CVA, s/p PFO closure 5/2019     ED 8/14/21 - 3d history of sore throat, headache, myalgia. Pain near L SI joint. WBC was 11.9. CXR was clear. XR and CT of L spine negative. COVID NAAT was negative. Throat GABHS screen was positive and he was given IM bicillin. Fever persisted. He was called back to the ED the next day when [facility] turned positive for GABHS. WBC is wnl. LFTs are slightly elevated. Today having pain in the chest w inspiration. L back/hip pain is now absent. Current abx:  vanc 750 q8       Past Surgical History:       Diagnosis Date    CVA (cerebral vascular accident) ([address] 2019    no residuals EXCEPT PERIPHERAL RT SIDE VISION    Hernia     History of bone graft     PFO (patent foramen ovale) 05/09/2019    PFO closure with 25 mm PFO occluder device    TMJ (dislocation of temporomandibular joint)          Procedure Laterality Date    ANTERIOR CRUCIATE LIGAMENT REPAIR Left 9/10/14    BONE GRAFT      left wrist    CARDIAC SURGERY  2019    PFO  plACED AFTER STROKE    HAND SURGERY      right thumb pins    PRE-MALIGNANT / BENIGN SKIN LESION EXCISION N/A 9/18/2020    SEBACEOUS CYST EXCISION, POSTERIOR SCALP performed by Juana Lopez MD at [place] [address] History:    TOBACCO:   reports that he quit smoking about 2 years ago. His smoking use included cigarettes. He smoked 0.33 packs per day. He has quit using smokeless tobacco.  ETOH:   reports current alcohol use of about 6.0 standard drinks of alcohol per week. There is no history of illicit drug use or other significant epidemiologic exposures.       Family History:       Problem Relation Age of Onset    Cancer Maternal Grandmother     Cancer Maternal Grandfather     Heart Failure Maternal Grandfather     Cancer Paternal Grandmother     High Blood Pressure Paternal Grandmother         MI    Cancer Paternal Grandfather     Other Paternal Grandfather        Current Medications:    Current Facility-Administered Medications: atorvastatin (LIPITOR) tablet 40 mg, 40 mg, Oral, Nightly  aspirin EC tablet 81 mg, 81 mg, Oral, Daily  sodium chloride flush 0.9 % injection 5-40 mL, 5-40 mL, Intravenous, 2 times per day  sodium chloride flush 0.9 % injection 5-40 mL, 5-40 mL, Intravenous, PRN  0.9 % sodium chloride infusion, 25 mL, Intravenous, PRN  enoxaparin (LOVENOX) injection 40 mg, 40 mg, Subcutaneous, Daily  ondansetron (ZOFRAN-ODT) disintegrating tablet 4 mg, 4 mg, Oral, Q8H PRN **OR** ondansetron (ZOFRAN) injection 4 mg, 4 mg, Intravenous, Q6H PRN  polyethylene glycol (GLYCOLAX) packet 17 g, 17 g, Oral, Daily PRN  acetaminophen (TYLENOL) tablet 650 mg, 650 mg, Oral, Q6H PRN **OR** acetaminophen (TYLENOL) suppository 650 mg, 650 mg, Rectal, Q6H PRN  0.9 % sodium chloride infusion, , Intravenous, Continuous  vancomycin (VANCOCIN) 750 mg in dextrose 5 % 250 mL IVPB, 750 mg, Intravenous, Q8H  benzocaine-menthol (CEPACOL SORE THROAT) lozenge 1 lozenge, 1 lozenge, Oral, Q2H PRN  ketorolac (TORADOL) injection 30 mg, 30 mg, Intravenous, Q6H PRN  morphine (PF) injection 4 mg, 4 mg, Intravenous, Q4H PRN      No Known Allergies       REVIEW OF SYSTEMS:    CONSTITUTIONAL:   Per HPI    EYES:  negative for blurred vision, eye discharge, visual disturbance and icterus  HEENT:  negative for acute hearing loss, tinnitus, ear drainage, sinus pressure, nasal congestion, epistaxis and snoring  RESPIRATORY:  No cough, hemoptysis  CARDIOVASCULAR:  negative for palpitations, exertional chest pressure/discomfort, edema, syncope  GASTROINTESTINAL:  negative for nausea, vomiting, diarrhea, constipation, blood in stool and abdominal pain  GENITOURINARY:  negative for frequency, dysuria, urinary incontinence, decreased urine volume, and hematuria  HEMATOLOGIC/LYMPHATIC:  negative for easy bruising, bleeding and lymphadenopathy  ALLERGIC/IMMUNOLOGIC:  negative for recurrent infections, angioedema, anaphylaxis and drug reactions  ENDOCRINE:  negative for weight changes and diabetic symptoms including polyuria, polydipsia and polyphagia  MUSCULOSKELETAL:  negative for acute joint swelling, decreased range of motion and muscle weakness  NEUROLOGICAL:  negative for headaches, slurred speech, unilateral weakness  PSYCHIATRIC/BEHAVIORAL: negative for hallucinations, behavioral problems, confusion and agitation. Objective:   PHYSICAL EXAM:      VITALS:  BP (!) 131/97   Pulse 88   Temp 98.3 °F (36.8 °C) (Oral)   Resp 17   Ht 6' (1.829 m)   Wt 170 lb (77.1 kg)   SpO2 96%   BMI 23.06 kg/m²      24HR INTAKE/OUTPUT:      Intake/Output Summary (Last 24 hours) at 8/16/2021 1634  Last data filed at 8/16/2021 9357  Gross per 24 hour   Intake 250 ml   Output    Net 250 ml     CONSTITUTIONAL:  Awake, alert, cooperative, no apparent distress, and appears stated age  [de-identified]: NCAT, PERRL, EOMI. Sclera white, conjunctiva full. OP with moist mucosal membranes, no thrush, tongue protrudes midline  NECK:  Supple, symmetrical, trachea midline, no adenopathy  LUNGS:  no increased work of breathing, CTA kamar without W/R/R  CARDIOVASCULAR:  RRR without murmur   ABDOMEN:  normal bowel sounds, soft, flat, NT   No spinal tenderness   PSYCHIATRIC: Oriented to person place and time. No obvious depression or anxiety. MUSCULOSKELETAL: No obvious misalignment or effusion of the joints. No clubbing, cyanosis of the digits. SKIN:  normal skin color, texture, turgor and no redness, warmth, or swelling.  No palpable nodules or stigmata of embolic phenomenon  NEUROLOGIC: nonfocal exam  ACCESS:  IV site ok       DATA:    Old records have been reviewed    CBC:  Recent Labs     08/14/21  1746 08/15/21  2058 08/16/21  0616   WBC 11.9* 6.8 7.5   RBC 5.04 4.66 4.10*   HGB 15.0 14.1 12.4*   HCT 44.0 40.4* 35.6*    228 194   MCV 87.3 86.7 86.8   MCH 29.7 30.3 30.3   MCHC 34.0 34.9 35.0   RDW 13.0 13.5 13.3      BMP:  Recent Labs     08/14/21  1746 08/15/21  2058 08/16/21  0616   * 133* 138   K 4.2 3.7 3.6   CL 96* 97* 104   CO2 23 23 25   BUN 17 11 7   CREATININE 1.1 0.9 0.9   CALCIUM 9.4 9.3 8.5   GLUCOSE 127* 121* 130*        Cultures:   8/14 BC #1 S pyogenes   BC #2 NGTD   Throat GABHS screen +    COVID NAAT neg   8/16 COVID NAAT neg     COVID PCR pending       Radiology Review:  All pertinent images / reports were reviewed as a part of this visit. CT chest 8/15/21  Impression   1. Multifocal airspace disease, including scattered nodular airspace disease. Correlate with presentation.  Follow-up to resolution recommended. 2. Suboptimal opacification of the pulmonary arteries.  No acute pulmonary   embolism to the lobar arteries given limitation. 3. Other findings as described. CT L spine negative      Assessment:     Patient Active Problem List   Diagnosis    Acute medial meniscal tear    ACL tear    [facility] ([facility]A) [facility] [address]    Leukocytosis    Head ache    PFO (patent foramen ovale)    Closed displaced fracture of base of fourth metacarpal bone of right hand    Closed displaced fracture of body of hamate of right wrist    Sebaceous cyst    Streptococcal bacteremia    Pharyngitis due to Streptococcus species    S/P patent foramen ovale closure       S pyogenes bacteremia   Acute throat/neck pain with +throat GABHS test  Pleuritic chest pain with evolving, nodular multifocal opacities   Elevated LFTs  L hip/low back pain at onset of fever, now resolved. No focal tenderness     History of PFO s/p repair        Medical decision making -   Streptococcal bacteremia as a complication of pharyngitis is very rare. The evolving nodular lung infiltrates could be septic emboli.   COVID could be culprit though he has had 3 negative tests. PCR is pending   Will need to rule out deep space neck infection and endovascular infection as cause or complication of streptococcal infection     -DC vanc and give IV pcn   -repeat BC   -get echo and CT neck  -trend LFTs  -isolation pending COVID PCR result   -if LFTs remain elevated with negative COVID test, he will need US or CT of liver     Discussed with patient and SO by phone   Will follow        Shahnaz Jimenez M.D. Thank you for the opportunity to participate in the care of your patient.     Please do not hesitate to contact me:   [phone] office

## 2021-08-16 NOTE — CONSULTS
Pharmacy Note  Vancomycin Consult    Shauna Cesar is a 30 y.o. male started on Vancomycin for Bloodstream infection; consult received from Dr. Coby Menon to manage therapy. Also receiving the following antibiotics: None. Allergies:  Patient has no known allergies. Tmax:     Recent Labs     08/14/21  1746 08/15/21  2058   CREATININE 1.1 0.9       Recent Labs     08/14/21  1746 08/15/21  2058   WBC 11.9* 6.8       Estimated Creatinine Clearance: 126 mL/min (based on SCr of 0.9 mg/dL). No intake or output data in the 24 hours ending 08/16/21 0137    Wt Readings from Last 1 Encounters:   08/15/21 170 lb (77.1 kg)         Body mass index is 23.06 kg/m². Culture Date      Source                       Results     Goal Vancomycin trough: 15-20 mcg/mL   Goal Vancomycin AUC: 400-600     Assessment/Plan:  Will initiate Vancomycin with a one time loading dose of 1500 mg x1, followed by 750 mg IV every 8 hours. Calculated . Vancomycin trough ordered for 8/16 at 1800. Timing of trough level will be determined based on culture results, renal function, and clinical response. Thank you for the consult.   Erica Fields, PharmD  8/16/2021 1:40 AM

## 2021-08-16 NOTE — ED NOTES
Patient resting in bed at this time, awaiting room assignment. Call light within reach.       Varun Pham RN  08/16/21 3455

## 2021-08-16 NOTE — ED NOTES
Pt wife called for update on pt. Pt wife states that pt is feeling neglected. Pt wife assured that pt is being followed and monitored as ordered per floor orders. Pt remains a hold at this time. Pt wife states that pt requested a \"menu over an hour ago\". This writer assured wife that pt will be given a menu asap.        Eli Zarate RN  08/16/21 9812

## 2021-08-16 NOTE — ED NOTES
PS message sent to Dr. Dolores Alexander for pain medication order per patient request.      Vera Granda RN  08/16/21 9545

## 2021-08-16 NOTE — H&P
Hospital Medicine History & Physical      PCP: No primary care provider on file. Date of Admission: 8/15/2021    Date of Service: Pt seen/examined on 8/16/2021 and Admitted to Inpatient with expected LOS greater than two midnights due to medical therapy. Chief Complaint: Asked to come to ED as blood cultures were positive from yesterday    History Of Present Illness:    30 y.o. male who presented to [facility] with above complaints  Patient was evaluated in the ED on 8/14 for generalized fatigue, fever, sore throat, shortness of breath and multiple other complaints. He had gone to urgent care and had a rapid Covid test and was told it was negative. He has been having difficulty eating and drinking because the throat is very sore. So he came into the ED to get evaluated. His rapid strep test was positive. Patient was given IM penicillin after blood cultures were collected. He was prescribed tramadol and discharged home. Patient's blood cultures came back positive and was asked to come to the ED today. Patient reports he continues to have intermittent fevers, sore throat. Patient also reports has been having intermittent shortness of breath, no cough.     Past Medical History:          Diagnosis Date    CVA (cerebral vascular accident) ([address] 2019    no residuals EXCEPT PERIPHERAL RT SIDE VISION    Hernia     History of bone graft     PFO (patent foramen ovale) 05/09/2019    PFO closure with 25 mm PFO occluder device    TMJ (dislocation of temporomandibular joint)        Past Surgical History:          Procedure Laterality Date    ANTERIOR CRUCIATE LIGAMENT REPAIR Left 9/10/14    BONE GRAFT      left wrist    CARDIAC SURGERY  2019    PFO  plACED AFTER STROKE    HAND SURGERY      right thumb pins    PRE-MALIGNANT / BENIGN SKIN LESION EXCISION N/A 9/18/2020    SEBACEOUS CYST EXCISION, POSTERIOR SCALP performed by Harvinder Chung MD at [address] [facility] Prior to Admission: Prior to Admission medications    Medication Sig Start Date End Date Taking? Authorizing Provider   traMADol (ULTRAM) 50 MG tablet Take 1 tablet by mouth every 6 hours as needed for Pain for up to 3 days. Intended supply: 7 days. Take lowest dose possible to manage pain 8/14/21 8/17/21 Yes Francis Hedrick MD   aspirin EC 81 MG EC tablet Take 1 tablet by mouth daily 11/3/20  Yes Chai Cohn MD   atorvastatin (LIPITOR) 40 MG tablet Take 1 tablet by mouth nightly 11/6/19  Yes Chai Cohn MD       Allergies:  Patient has no known allergies. Social History:      The patient currently lives at home    TOBACCO:   reports that he quit smoking about 2 years ago. His smoking use included cigarettes. He smoked 0.33 packs per day. He has quit using smokeless tobacco.  ETOH:   reports current alcohol use of about 6.0 standard drinks of alcohol per week. E-Cigarettes/Vaping Use     Questions Responses    E-Cigarette/Vaping Use Never User    Start Date     Passive Exposure     Quit Date     Counseling Given     Comments             Family History:  Positive as follows:        Problem Relation Age of Onset    Cancer Maternal Grandmother     Cancer Maternal Grandfather     Heart Failure Maternal Grandfather     Cancer Paternal Grandmother     High Blood Pressure Paternal Grandmother         MI    Cancer Paternal Grandfather     Other Paternal Grandfather        REVIEW OF SYSTEMS COMPLETED:   Pertinent positives as noted in the HPI. All other systems reviewed and negative. PHYSICAL EXAM PERFORMED:    /88   Pulse 83   Temp 98.5 °F (36.9 °C) (Oral)   Resp 25   Ht 6' (1.829 m)   Wt 170 lb (77.1 kg)   SpO2 98%   BMI 23.06 kg/m²     General appearance:  No apparent distress, appears stated age and cooperative. HEENT:    Posterior pharynx is erythematous with bilateral exudate and tonsillar enlargement  Normal cephalic, atraumatic without obvious deformity.  Pupils equal, round, and reactive to light.  Extra ocular muscles intact. Conjunctivae/corneas clear. Neck: Supple, with full range of motion. No jugular venous distention. Trachea midline. Respiratory:  Normal respiratory effort. Clear to auscultation, bilaterally without Rales/Wheezes/Rhonchi. Cardiovascular:  Regular rate and rhythm with normal S1/S2 without murmurs, rubs or gallops. Abdomen: Soft, non-tender, non-distended with normal bowel sounds. Musculoskeletal:  No clubbing, cyanosis or edema bilaterally. Full range of motion without deformity. Skin: Skin color, texture, turgor normal.  No rashes or lesions. Neurologic:  Neurovascularly intact without any focal sensory/motor deficits. Cranial nerves: II-XII intact, grossly non-focal.  Psychiatric:  Alert and oriented, thought content appropriate, normal insight  Capillary Refill: Brisk,3 seconds, normal  Peripheral Pulses: +2 palpable, equal bilaterally       Labs:     Recent Labs     08/14/21  1746 08/15/21  2058   WBC 11.9* 6.8   HGB 15.0 14.1   HCT 44.0 40.4*    228     Recent Labs     08/14/21  1746 08/15/21  2058   * 133*   K 4.2 3.7   CL 96* 97*   CO2 23 23   BUN 17 11   CREATININE 1.1 0.9   CALCIUM 9.4 9.3     Recent Labs     08/14/21  1746 08/15/21  2058   AST 19 62*   ALT 21 50*   BILITOT 0.5 0.8   ALKPHOS 103 136*     No results for input(s): INR in the last 72 hours. Recent Labs     08/14/21 1746   TROPONINI <0.01       Urinalysis:      Lab Results   Component Value Date    NITRU Negative 08/14/2021    WBCUA 6-9 08/14/2021    BACTERIA 3+ 08/14/2021    RBCUA 11-20 08/14/2021    BLOODU MODERATE 08/14/2021    SPECGRAV 1.025 08/14/2021    GLUCOSEU Negative 08/14/2021       Radiology:     I personally reviewed the CT chest pulmonary, multifocal airspace opacities with groundglass appearance evident      CT CHEST PULMONARY EMBOLISM W CONTRAST   Final Result   1. Multifocal airspace disease, including scattered nodular airspace disease. Correlate with presentation. Follow-up to resolution recommended. 2. Suboptimal opacification of the pulmonary arteries. No acute pulmonary   embolism to the lobar arteries given limitation. 3. Other findings as described. 8/15/2021 11:46 AM - Alistair Alexander Incoming Lab Results From Soft (Epic Adt)    Specimen Information: Blood        Component Collected Lab   Blood Culture, Routine Abnormal  08/14/2021  6:39 PM  - [facility]   Gram stain Anaerobic bottle:   Gram positive cocci in chains and/or pairs   resembling Streptococcus   Information to follow   Gram stain Aerobic bottle:   Gram positive cocci in chains and/or pairs   resembling Streptococcus   Information to follow    Organism Abnormal  08/14/2021  6:39 PM [facility]   Streptococcus pyogenes ([facility] Gr A) DNA Detected        ASSESSMENT:PLAN:    Streptococcal pharyngitis with bacteremia  Blood cultures from 8/14 1 of 2 sets positive for group A strep. Rapid strep throat was positive on 8/14. Start IV vancomycin, pharmacy dosing  Supportive care/adjunctive treatment with NSAID, soft diet  Consult ID  SARS-CoV-2 NAAT pending (was negative from 8/14)     Suspected COVID-19  Abnormal CT chest  Ground-glass the confluent airspace disease noted. Multifocal scattered nodular airspace disease noted     -Highly suspicious for COVID-19 given lymphopenia, elevated AST/ALT. may have concomitant streptococcal pneumonia  -We will repeat COVID-19 NAAT, if negative will need PCR, continue droplet plus isolation  -Currently not hypoxemic    Hx of CVA  S/p PFO closure  Continue aspirin and statin      DVT Prophylaxis: Lovenox  Diet: ADULT DIET; Regular  Code Status: Full Code    PT/OT Eval Status: Ambulatory    Dispo -IP stay       Deanna Hart MD    Thank you No primary care provider on file. for the opportunity to be involved in this patient's care. If you have any questions or concerns please feel free to contact me at [phone].

## 2021-08-17 ENCOUNTER — APPOINTMENT (OUTPATIENT)
Dept: GENERAL RADIOLOGY | Age: 36
DRG: 853 | End: 2021-08-17
Payer: COMMERCIAL

## 2021-08-17 LAB
ALBUMIN SERPL-MCNC: 3.3 G/DL (ref 3.4–5)
ALP BLD-CCNC: 198 U/L (ref 40–129)
ALT SERPL-CCNC: 71 U/L (ref 10–40)
AST SERPL-CCNC: 59 U/L (ref 15–37)
BILIRUB SERPL-MCNC: 0.8 MG/DL (ref 0–1)
BILIRUBIN DIRECT: 0.5 MG/DL (ref 0–0.3)
BILIRUBIN, INDIRECT: 0.3 MG/DL (ref 0–1)
BLOOD CULTURE, ROUTINE: ABNORMAL
BLOOD CULTURE, ROUTINE: ABNORMAL
ORGANISM: ABNORMAL
ORGANISM: ABNORMAL
TOTAL PROTEIN: 7.1 G/DL (ref 6.4–8.2)

## 2021-08-17 PROCEDURE — 6370000000 HC RX 637 (ALT 250 FOR IP): Performed by: NURSE PRACTITIONER

## 2021-08-17 PROCEDURE — 71046 X-RAY EXAM CHEST 2 VIEWS: CPT

## 2021-08-17 PROCEDURE — 87040 BLOOD CULTURE FOR BACTERIA: CPT

## 2021-08-17 PROCEDURE — 36415 COLL VENOUS BLD VENIPUNCTURE: CPT

## 2021-08-17 PROCEDURE — 2580000003 HC RX 258: Performed by: INTERNAL MEDICINE

## 2021-08-17 PROCEDURE — 80076 HEPATIC FUNCTION PANEL: CPT

## 2021-08-17 PROCEDURE — 6370000000 HC RX 637 (ALT 250 FOR IP): Performed by: INTERNAL MEDICINE

## 2021-08-17 PROCEDURE — 99232 SBSQ HOSP IP/OBS MODERATE 35: CPT | Performed by: INTERNAL MEDICINE

## 2021-08-17 PROCEDURE — 1200000000 HC SEMI PRIVATE

## 2021-08-17 PROCEDURE — 02HV33Z INSERTION OF INFUSION DEVICE INTO SUPERIOR VENA CAVA, PERCUTANEOUS APPROACH: ICD-10-PCS | Performed by: RADIOLOGY

## 2021-08-17 PROCEDURE — 6360000002 HC RX W HCPCS: Performed by: INTERNAL MEDICINE

## 2021-08-17 PROCEDURE — 6360000002 HC RX W HCPCS: Performed by: NURSE PRACTITIONER

## 2021-08-17 RX ORDER — HYDROMORPHONE HCL 110MG/55ML
2 PATIENT CONTROLLED ANALGESIA SYRINGE INTRAVENOUS
Status: DISCONTINUED | OUTPATIENT
Start: 2021-08-17 | End: 2021-08-19

## 2021-08-17 RX ORDER — KETOROLAC TROMETHAMINE 30 MG/ML
30 INJECTION, SOLUTION INTRAMUSCULAR; INTRAVENOUS EVERY 6 HOURS PRN
Status: DISPENSED | OUTPATIENT
Start: 2021-08-17 | End: 2021-08-22

## 2021-08-17 RX ORDER — OXYCODONE HYDROCHLORIDE AND ACETAMINOPHEN 5; 325 MG/1; MG/1
1 TABLET ORAL EVERY 4 HOURS PRN
Status: DISCONTINUED | OUTPATIENT
Start: 2021-08-17 | End: 2021-08-22

## 2021-08-17 RX ORDER — OXYCODONE HYDROCHLORIDE AND ACETAMINOPHEN 5; 325 MG/1; MG/1
2 TABLET ORAL EVERY 4 HOURS PRN
Status: COMPLETED | OUTPATIENT
Start: 2021-08-17 | End: 2021-08-18

## 2021-08-17 RX ADMIN — MORPHINE SULFATE 4 MG: 4 INJECTION, SOLUTION INTRAMUSCULAR; INTRAVENOUS at 06:00

## 2021-08-17 RX ADMIN — OXYCODONE HYDROCHLORIDE AND ACETAMINOPHEN 1 TABLET: 5; 325 TABLET ORAL at 15:59

## 2021-08-17 RX ADMIN — OXYCODONE HYDROCHLORIDE AND ACETAMINOPHEN 2 TABLET: 5; 325 TABLET ORAL at 21:05

## 2021-08-17 RX ADMIN — DEXTROSE MONOHYDRATE 4 MILLION UNITS: 5 INJECTION INTRAVENOUS at 10:54

## 2021-08-17 RX ADMIN — DEXTROSE MONOHYDRATE 4 MILLION UNITS: 5 INJECTION INTRAVENOUS at 14:38

## 2021-08-17 RX ADMIN — SODIUM CHLORIDE, PRESERVATIVE FREE 10 ML: 5 INJECTION INTRAVENOUS at 08:27

## 2021-08-17 RX ADMIN — DEXTROSE MONOHYDRATE 4 MILLION UNITS: 5 INJECTION INTRAVENOUS at 18:53

## 2021-08-17 RX ADMIN — DEXTROSE MONOHYDRATE 4 MILLION UNITS: 5 INJECTION INTRAVENOUS at 02:04

## 2021-08-17 RX ADMIN — DEXTROSE MONOHYDRATE 4 MILLION UNITS: 5 INJECTION INTRAVENOUS at 06:02

## 2021-08-17 RX ADMIN — KETOROLAC TROMETHAMINE 30 MG: 30 INJECTION, SOLUTION INTRAMUSCULAR; INTRAVENOUS at 14:36

## 2021-08-17 RX ADMIN — ENOXAPARIN SODIUM 40 MG: 40 INJECTION SUBCUTANEOUS at 08:27

## 2021-08-17 RX ADMIN — DEXTROSE MONOHYDRATE 4 MILLION UNITS: 5 INJECTION INTRAVENOUS at 23:33

## 2021-08-17 RX ADMIN — Medication 1 LOZENGE: at 06:08

## 2021-08-17 RX ADMIN — KETOROLAC TROMETHAMINE 30 MG: 30 INJECTION, SOLUTION INTRAMUSCULAR; INTRAVENOUS at 02:02

## 2021-08-17 RX ADMIN — KETOROLAC TROMETHAMINE 30 MG: 30 INJECTION, SOLUTION INTRAMUSCULAR; INTRAVENOUS at 20:23

## 2021-08-17 RX ADMIN — HYDROMORPHONE HYDROCHLORIDE 1 MG: 1 INJECTION, SOLUTION INTRAMUSCULAR; INTRAVENOUS; SUBCUTANEOUS at 13:49

## 2021-08-17 RX ADMIN — SODIUM CHLORIDE, PRESERVATIVE FREE 10 ML: 5 INJECTION INTRAVENOUS at 21:08

## 2021-08-17 RX ADMIN — HYDROMORPHONE HYDROCHLORIDE 1 MG: 2 INJECTION, SOLUTION INTRAMUSCULAR; INTRAVENOUS; SUBCUTANEOUS at 23:31

## 2021-08-17 RX ADMIN — KETOROLAC TROMETHAMINE 30 MG: 30 INJECTION, SOLUTION INTRAMUSCULAR; INTRAVENOUS at 08:26

## 2021-08-17 RX ADMIN — ASPIRIN 81 MG: 81 TABLET, COATED ORAL at 08:25

## 2021-08-17 RX ADMIN — HYDROMORPHONE HYDROCHLORIDE 2 MG: 2 INJECTION, SOLUTION INTRAMUSCULAR; INTRAVENOUS; SUBCUTANEOUS at 17:51

## 2021-08-17 RX ADMIN — ATORVASTATIN CALCIUM 40 MG: 10 TABLET, FILM COATED ORAL at 21:06

## 2021-08-17 RX ADMIN — SODIUM CHLORIDE: 9 INJECTION, SOLUTION INTRAVENOUS at 22:20

## 2021-08-17 RX ADMIN — HYDROMORPHONE HYDROCHLORIDE 1 MG: 1 INJECTION, SOLUTION INTRAMUSCULAR; INTRAVENOUS; SUBCUTANEOUS at 09:47

## 2021-08-17 ASSESSMENT — PAIN DESCRIPTION - LOCATION
LOCATION: RIB CAGE
LOCATION: RIB CAGE

## 2021-08-17 ASSESSMENT — PAIN SCALES - GENERAL
PAINLEVEL_OUTOF10: 7
PAINLEVEL_OUTOF10: 8
PAINLEVEL_OUTOF10: 6
PAINLEVEL_OUTOF10: 7
PAINLEVEL_OUTOF10: 7
PAINLEVEL_OUTOF10: 10
PAINLEVEL_OUTOF10: 7
PAINLEVEL_OUTOF10: 7
PAINLEVEL_OUTOF10: 8
PAINLEVEL_OUTOF10: 5
PAINLEVEL_OUTOF10: 10

## 2021-08-17 ASSESSMENT — PAIN DESCRIPTION - ORIENTATION
ORIENTATION: RIGHT;LEFT
ORIENTATION: RIGHT;LEFT

## 2021-08-17 ASSESSMENT — PAIN DESCRIPTION - DESCRIPTORS: DESCRIPTORS: SHARP

## 2021-08-17 ASSESSMENT — PAIN DESCRIPTION - PAIN TYPE
TYPE: ACUTE PAIN
TYPE: ACUTE PAIN

## 2021-08-17 NOTE — PROGRESS NOTES
Hospitalist Progress Note      PCP: Teto Moya MD    Date of Admission: 8/15/2021    Chief Complaint: positive blood cx    Subjective: no new c/o. Severe Bilateral Pleuritic Chest pain w/ inspiration requiring IV Narcotic analgesia. Medications:  Reviewed    Infusion Medications    sodium chloride      sodium chloride 75 mL/hr at 08/16/21 0210     Scheduled Medications    atorvastatin  40 mg Oral Nightly    aspirin EC  81 mg Oral Daily    sodium chloride flush  5-40 mL Intravenous 2 times per day    enoxaparin  40 mg Subcutaneous Daily    penicillin G  4 Million Units Intravenous Q4H     PRN Meds: HYDROmorphone, sodium chloride flush, sodium chloride, ondansetron **OR** ondansetron, polyethylene glycol, acetaminophen **OR** acetaminophen, benzocaine-menthol, ketorolac, perflutren lipid microspheres    No intake or output data in the 24 hours ending 08/17/21 0937    Physical Exam Performed:    BP (!) 152/98   Pulse 91   Temp 98.4 °F (36.9 °C) (Oral)   Resp 16   Ht 6' (1.829 m)   Wt 178 lb (80.7 kg)   SpO2 95%   BMI 24.14 kg/m²     General appearance: No apparent distress, appears stated age and cooperative. HEENT: Pupils equal, round, and reactive to light. Conjunctivae/corneas clear. Neck: Supple, with full range of motion. No jugular venous distention. Trachea midline. Respiratory:  Normal respiratory effort. Clear to auscultation, bilaterally without Rales/Wheezes/Rhonchi. Cardiovascular: Regular rate and rhythm with normal S1/S2 without murmurs, rubs or gallops. Abdomen: Soft, non-tender, non-distended with normal bowel sounds. Musculoskeletal: No clubbing, cyanosis or edema bilaterally. Full range of motion without deformity. Skin: Skin color, texture, turgor normal.  No rashes or lesions. Neurologic:  Neurovascularly intact without any focal sensory/motor deficits.  Cranial nerves: II-XII intact, grossly non-focal.  Psychiatric: Alert and oriented, thought content appropriate, normal insight  Capillary Refill: Brisk,< 3 seconds   Peripheral Pulses: +2 palpable, equal bilaterally       Labs:   Recent Labs     08/14/21  1746 08/15/21  2058 08/16/21  0616   WBC 11.9* 6.8 7.5   HGB 15.0 14.1 12.4*   HCT 44.0 40.4* 35.6*    228 194     Recent Labs     08/14/21  1746 08/15/21  2058 08/16/21  0616   * 133* 138   K 4.2 3.7 3.6   CL 96* 97* 104   CO2 23 23 25   BUN 17 11 7   CREATININE 1.1 0.9 0.9   CALCIUM 9.4 9.3 8.5     Recent Labs     08/14/21  1746 08/15/21  2058   AST 19 62*   ALT 21 50*   BILITOT 0.5 0.8   ALKPHOS 103 136*     No results for input(s): INR in the last 72 hours. Recent Labs     08/14/21 1746   TROPONINI <0.01       Urinalysis:      Lab Results   Component Value Date    NITRU Negative 08/14/2021    WBCUA 6-9 08/14/2021    BACTERIA 3+ 08/14/2021    RBCUA 11-20 08/14/2021    BLOODU MODERATE 08/14/2021    SPECGRAV 1.025 08/14/2021    GLUCOSEU Negative 08/14/2021       Consults:    IP CONSULT TO HOSPITALIST  IP CONSULT TO PHARMACY  IP CONSULT TO INFECTIOUS DISEASES      Assessment/Plan:    Active Hospital Problems    Diagnosis     Pharyngitis due to Streptococcus species [J02.0]     S/P patent foramen ovale closure [Z87.74]     Streptococcal bacteremia [R78.81, B95.5]     C[address]brovascular accident ([address]A) ([facility] [address] [I63.9]        Strep Bacteremia - w/ pharygitis. Infectious Disease consulted and appreciated. Currently on PCN started 16 August - continued. Sepsis - w/ Leukocytosis/Tachycardia/Fever POArrival 2nd to above infection. Continue IVF as appropriate and monitor clinical response w/ ABX as written. COVID 19 - initially suspected, ruled out w/ both NEGATIVE NAAT/PCR. Hx CVA - s/p PFO closure controlled on ASA/Stating for 2nd risk reduction. DVT Prophylaxis: [facility]    Recent Labs     08/14/21  1746 08/15/21  2058 08/16/21  0616    228 194     Diet: ADULT DIET;  Regular  Code Status: Full Code      PT/OT Eval Status: Not yet ordered. Dispo - Possibly Wed/Thurs 18/19 August pending clinical course and subspecialty recs.      Daniel Arora MD

## 2021-08-17 NOTE — PROGRESS NOTES
Infectious Disease Follow up Notes    CC :  Streptococcal bacteremia      Antibiotics:   pcnG 4mu q4    Admit Date:   8/15/2021  Hospital Day: 3    Subjective:   AF >24 hours, on RA   Increased pleuritic pain allison L base. Relived with dilaudid, toradol. No cough. Does not feel SOB. No GI/ complaints. Throat/neck pain largely resolved    Objective:     Patient Vitals for the past 8 hrs:   BP Temp Temp src Pulse Resp SpO2   08/17/21 0810 (!) 152/98 98.4 °F (36.9 °C) Oral 91 16 95 %       EXAM:  General:  Alert, ambulatory in room. Appears uncomfortable, non-toxic    HEENT:  NCAT, PERRL, sclera anicteric   NECK:   Supple, symmetric   LUNGS:   Splinting.   Decreased breath sounds L base with few rales at kamar bases  CV:   RRR without murmur   ABD:  Soft, flat  EXT:  No focal rash, no LE edema          LINE: PIV site ok         Scheduled Meds:   atorvastatin  40 mg Oral Nightly    aspirin EC  81 mg Oral Daily    sodium chloride flush  5-40 mL Intravenous 2 times per day    enoxaparin  40 mg Subcutaneous Daily    penicillin G  4 Million Units Intravenous Q4H       Continuous Infusions:   sodium chloride      sodium chloride 75 mL/hr at 08/16/21 0210          Data Review:    Lab Results   Component Value Date    WBC 7.5 08/16/2021    HGB 12.4 (L) 08/16/2021    HCT 35.6 (L) 08/16/2021    MCV 86.8 08/16/2021     08/16/2021     Lab Results   Component Value Date    CREATININE 0.9 08/16/2021    BUN 7 08/16/2021     08/16/2021    K 3.6 08/16/2021     08/16/2021    CO2 25 08/16/2021       Hepatic Function Panel:   Lab Results   Component Value Date    ALKPHOS 136 08/15/2021    ALT 50 08/15/2021    AST 62 08/15/2021    PROT 7.5 08/15/2021    PROT 7.0 03/07/2010    BILITOT 0.8 08/15/2021    LABALBU 3.8 08/15/2021         MICRO:  8/14     BC #1  S pyogenes              BC #2 NGTD              Throat GABHS screen + COVID NAAT neg   8/16     COVID NAAT neg                     COVID PCR neg       IMAGING:  CT chest 8/15/21  Impression   1. Multifocal airspace disease, including scattered nodular airspace disease. Correlate with presentation.  Follow-up to resolution recommended. 2. Suboptimal opacification of the pulmonary arteries.  No acute pulmonary   embolism to the lobar arteries given limitation. 3. Other findings as described.      CT neck 8/16/21 negative     CT L spine negative      Assessment:     Patient Active Problem List    Diagnosis Date Noted    Pharyngitis due to Streptococcus species 08/16/2021    S/P patent foramen ovale closure 08/16/2021    Streptococcal bacteremia 08/15/2021    Sebaceous cyst     Closed displaced fracture of base of fourth metacarpal bone of right hand 06/27/2019    Closed displaced fracture of body of hamate of right wrist 06/27/2019    PFO (patent foramen ovale)     C[address]brovascular accident (CVA) [address] [address] 03/11/2019    Leukocytosis 03/11/2019    Head ache 03/11/2019    Acute medial meniscal tear 07/29/2014    ACL tear 07/29/2014       S pyogenes bacteremia   Acute throat/neck pain with +throat GABHS test    Pleuritic chest pain with evolving, nodular multifocal opacities   Multiple COVID tests negative     Elevated LFTs     History of PFO s/p repair       NKDA     Plan:   His pleuritic pain is more pronounced today yet no cough or fever.   Will get CXR to see if infiltrate is worse or if he has developed effusion   Repeat BC were ordered  LFTs pending  Echo ordered - ?R side endocarditis    Continue PCN as ordered     IS to bedside     If LFTs are still elevated, will get US       Discussed with patient/family, all questions answered  D/w DENTON Cuba MD  Phone: [phone]   Fax : [phone]

## 2021-08-17 NOTE — CARE COORDINATION
Chart reviewed for possible needs at DC. Pt admitted 8/15/21 for Streptococcal bacteremia being followed by IM and ID. Per chart, pt from home and IPTA with no indication for PT/OT eval. Pt has PCP and current insurance listed. No immediate DC needs identified at this time. Please consult CM should needs arise.     Kalina Kaye RN

## 2021-08-17 NOTE — PLAN OF CARE
Problem: Pain:  Goal: Pain level will decrease  Description: Pain level will decrease  8/17/2021 1318 by Johana Ross RN  Outcome: Ongoing  8/17/2021 0244 by Kemi Joshi RN  Outcome: Ongoing  Goal: Control of acute pain  Description: Control of acute pain  8/17/2021 1318 by Johana Ross RN  Outcome: Ongoing  8/17/2021 0244 by Kemi Joshi RN  Outcome: Ongoing  Goal: Control of chronic pain  Description: Control of chronic pain  Outcome: Ongoing

## 2021-08-18 ENCOUNTER — APPOINTMENT (OUTPATIENT)
Dept: INTERVENTIONAL RADIOLOGY/VASCULAR | Age: 36
DRG: 853 | End: 2021-08-18
Payer: COMMERCIAL

## 2021-08-18 ENCOUNTER — APPOINTMENT (OUTPATIENT)
Dept: CT IMAGING | Age: 36
DRG: 853 | End: 2021-08-18
Payer: COMMERCIAL

## 2021-08-18 LAB
A/G RATIO: 0.9 (ref 1.1–2.2)
ALBUMIN SERPL-MCNC: 3.2 G/DL (ref 3.4–5)
ALP BLD-CCNC: 202 U/L (ref 40–129)
ALT SERPL-CCNC: 77 U/L (ref 10–40)
ANION GAP SERPL CALCULATED.3IONS-SCNC: 9 MMOL/L (ref 3–16)
AST SERPL-CCNC: 54 U/L (ref 15–37)
BILIRUB SERPL-MCNC: 0.9 MG/DL (ref 0–1)
BUN BLDV-MCNC: 6 MG/DL (ref 7–20)
CALCIUM SERPL-MCNC: 8.8 MG/DL (ref 8.3–10.6)
CHLORIDE BLD-SCNC: 100 MMOL/L (ref 99–110)
CO2: 28 MMOL/L (ref 21–32)
CREAT SERPL-MCNC: 0.8 MG/DL (ref 0.9–1.3)
CULTURE, BLOOD 2: NORMAL
GFR AFRICAN AMERICAN: >60
GFR NON-AFRICAN AMERICAN: >60
GLOBULIN: 3.6 G/DL
GLUCOSE BLD-MCNC: 114 MG/DL (ref 70–99)
HCT VFR BLD CALC: 35.1 % (ref 40.5–52.5)
HEMOGLOBIN: 12.1 G/DL (ref 13.5–17.5)
INR BLD: 1.22 (ref 0.88–1.12)
LV EF: 58 %
LVEF MODALITY: NORMAL
MCH RBC QN AUTO: 30.4 PG (ref 26–34)
MCHC RBC AUTO-ENTMCNC: 34.4 G/DL (ref 31–36)
MCV RBC AUTO: 88.4 FL (ref 80–100)
PDW BLD-RTO: 14.2 % (ref 12.4–15.4)
PHOSPHORUS: 4.7 MG/DL (ref 2.5–4.9)
PLATELET # BLD: 249 K/UL (ref 135–450)
PMV BLD AUTO: 7.7 FL (ref 5–10.5)
POTASSIUM SERPL-SCNC: 3.9 MMOL/L (ref 3.5–5.1)
PROTHROMBIN TIME: 13.9 SEC (ref 9.9–12.7)
RBC # BLD: 3.97 M/UL (ref 4.2–5.9)
SODIUM BLD-SCNC: 137 MMOL/L (ref 136–145)
TOTAL PROTEIN: 6.8 G/DL (ref 6.4–8.2)
WBC # BLD: 12 K/UL (ref 4–11)

## 2021-08-18 PROCEDURE — 85610 PROTHROMBIN TIME: CPT

## 2021-08-18 PROCEDURE — 1200000000 HC SEMI PRIVATE

## 2021-08-18 PROCEDURE — 99254 IP/OBS CNSLTJ NEW/EST MOD 60: CPT | Performed by: INTERNAL MEDICINE

## 2021-08-18 PROCEDURE — 85027 COMPLETE CBC AUTOMATED: CPT

## 2021-08-18 PROCEDURE — 36415 COLL VENOUS BLD VENIPUNCTURE: CPT

## 2021-08-18 PROCEDURE — 6370000000 HC RX 637 (ALT 250 FOR IP): Performed by: INTERNAL MEDICINE

## 2021-08-18 PROCEDURE — C1751 CATH, INF, PER/CENT/MIDLINE: HCPCS

## 2021-08-18 PROCEDURE — 99232 SBSQ HOSP IP/OBS MODERATE 35: CPT | Performed by: INTERNAL MEDICINE

## 2021-08-18 PROCEDURE — 80053 COMPREHEN METABOLIC PANEL: CPT

## 2021-08-18 PROCEDURE — 6360000002 HC RX W HCPCS: Performed by: INTERNAL MEDICINE

## 2021-08-18 PROCEDURE — 93306 TTE W/DOPPLER COMPLETE: CPT

## 2021-08-18 PROCEDURE — 6370000000 HC RX 637 (ALT 250 FOR IP): Performed by: NURSE PRACTITIONER

## 2021-08-18 PROCEDURE — 84100 ASSAY OF PHOSPHORUS: CPT

## 2021-08-18 PROCEDURE — 6360000004 HC RX CONTRAST MEDICATION: Performed by: FAMILY MEDICINE

## 2021-08-18 PROCEDURE — 36573 INSJ PICC RS&I 5 YR+: CPT

## 2021-08-18 PROCEDURE — 2580000003 HC RX 258: Performed by: INTERNAL MEDICINE

## 2021-08-18 PROCEDURE — 71260 CT THORAX DX C+: CPT

## 2021-08-18 RX ORDER — DOCUSATE SODIUM 100 MG/1
100 CAPSULE, LIQUID FILLED ORAL DAILY
Status: DISCONTINUED | OUTPATIENT
Start: 2021-08-18 | End: 2021-08-24

## 2021-08-18 RX ADMIN — OXYCODONE HYDROCHLORIDE AND ACETAMINOPHEN 2 TABLET: 5; 325 TABLET ORAL at 03:24

## 2021-08-18 RX ADMIN — ENOXAPARIN SODIUM 40 MG: 40 INJECTION SUBCUTANEOUS at 11:59

## 2021-08-18 RX ADMIN — DEXTROSE MONOHYDRATE 4 MILLION UNITS: 5 INJECTION INTRAVENOUS at 06:57

## 2021-08-18 RX ADMIN — ATORVASTATIN CALCIUM 40 MG: 10 TABLET, FILM COATED ORAL at 20:18

## 2021-08-18 RX ADMIN — DOCUSATE SODIUM 100 MG: 100 CAPSULE, LIQUID FILLED ORAL at 16:20

## 2021-08-18 RX ADMIN — OXYCODONE HYDROCHLORIDE AND ACETAMINOPHEN 2 TABLET: 5; 325 TABLET ORAL at 16:20

## 2021-08-18 RX ADMIN — DEXTROSE MONOHYDRATE 4 MILLION UNITS: 5 INJECTION INTRAVENOUS at 22:38

## 2021-08-18 RX ADMIN — HYDROMORPHONE HYDROCHLORIDE 2 MG: 2 INJECTION, SOLUTION INTRAMUSCULAR; INTRAVENOUS; SUBCUTANEOUS at 05:14

## 2021-08-18 RX ADMIN — DEXTROSE MONOHYDRATE 4 MILLION UNITS: 5 INJECTION INTRAVENOUS at 20:15

## 2021-08-18 RX ADMIN — IOPAMIDOL 75 ML: 755 INJECTION, SOLUTION INTRAVENOUS at 11:14

## 2021-08-18 RX ADMIN — OXYCODONE HYDROCHLORIDE AND ACETAMINOPHEN 2 TABLET: 5; 325 TABLET ORAL at 08:12

## 2021-08-18 RX ADMIN — KETOROLAC TROMETHAMINE 30 MG: 30 INJECTION, SOLUTION INTRAMUSCULAR; INTRAVENOUS at 21:34

## 2021-08-18 RX ADMIN — DEXTROSE MONOHYDRATE 4 MILLION UNITS: 5 INJECTION INTRAVENOUS at 16:20

## 2021-08-18 RX ADMIN — KETOROLAC TROMETHAMINE 30 MG: 30 INJECTION, SOLUTION INTRAMUSCULAR; INTRAVENOUS at 11:59

## 2021-08-18 RX ADMIN — DEXTROSE MONOHYDRATE 4 MILLION UNITS: 5 INJECTION INTRAVENOUS at 12:05

## 2021-08-18 RX ADMIN — DEXTROSE MONOHYDRATE 4 MILLION UNITS: 5 INJECTION INTRAVENOUS at 02:23

## 2021-08-18 RX ADMIN — HYDROMORPHONE HYDROCHLORIDE 1 MG: 2 INJECTION, SOLUTION INTRAMUSCULAR; INTRAVENOUS; SUBCUTANEOUS at 20:13

## 2021-08-18 RX ADMIN — ASPIRIN 81 MG: 81 TABLET, COATED ORAL at 11:59

## 2021-08-18 RX ADMIN — HYDROMORPHONE HYDROCHLORIDE 1 MG: 2 INJECTION, SOLUTION INTRAMUSCULAR; INTRAVENOUS; SUBCUTANEOUS at 14:48

## 2021-08-18 RX ADMIN — KETOROLAC TROMETHAMINE 30 MG: 30 INJECTION, SOLUTION INTRAMUSCULAR; INTRAVENOUS at 02:23

## 2021-08-18 ASSESSMENT — PAIN SCALES - GENERAL
PAINLEVEL_OUTOF10: 5
PAINLEVEL_OUTOF10: 8
PAINLEVEL_OUTOF10: 7
PAINLEVEL_OUTOF10: 10
PAINLEVEL_OUTOF10: 8
PAINLEVEL_OUTOF10: 8
PAINLEVEL_OUTOF10: 5
PAINLEVEL_OUTOF10: 8
PAINLEVEL_OUTOF10: 7
PAINLEVEL_OUTOF10: 10

## 2021-08-18 ASSESSMENT — PAIN DESCRIPTION - LOCATION: LOCATION: RIB CAGE

## 2021-08-18 ASSESSMENT — PAIN DESCRIPTION - PAIN TYPE: TYPE: ACUTE PAIN

## 2021-08-18 NOTE — PROGRESS NOTES
Infectious Disease Follow up Notes    CC :  Streptococcal bacteremia      Antibiotics:   pcnG 4mu q4    Admit Date:   8/15/2021  Hospital Day: 4    Subjective:   He remains AF. Has had 4 doses of percocet in the last 24 hours. Overall feeling better today, pleuritic pain is less pronounced. No cough. No GI/ complaints. No new focal complaints of pain. He seems to be tolerating the pcn well so far. Objective:     Patient Vitals for the past 8 hrs:   BP Temp Temp src Pulse Resp SpO2   08/18/21 0915 (!) 143/99 98 °F (36.7 °C) Oral 99 16 92 %       EXAM:  General:  Alert, oriented, appears more comfortable    HEENT:  NCAT, PERRL, sclera anicteric   NECK:   Supple, symmetric   LUNGS:   Non-labored breathing. Decreased breath sounds L base. Rales R base.   No wheezing   CV:   RRR without murmur   ABD:  Soft, flat  EXT:  No focal rash, no LE edema          LINE: PIV site ok         Scheduled Meds:   atorvastatin  40 mg Oral Nightly    aspirin EC  81 mg Oral Daily    sodium chloride flush  5-40 mL Intravenous 2 times per day    penicillin G  4 Million Units Intravenous Q4H       Continuous Infusions:   sodium chloride      sodium chloride 75 mL/hr at 08/17/21 2220          Data Review:    Lab Results   Component Value Date    WBC 12.0 (H) 08/18/2021    HGB 12.1 (L) 08/18/2021    HCT 35.1 (L) 08/18/2021    MCV 88.4 08/18/2021     08/18/2021     Lab Results   Component Value Date    CREATININE 0.8 (L) 08/18/2021    BUN 6 (L) 08/18/2021     08/18/2021    K 3.9 08/18/2021     08/18/2021    CO2 28 08/18/2021       Hepatic Function Panel:   Lab Results   Component Value Date    ALKPHOS 202 08/18/2021    ALT 77 08/18/2021    AST 54 08/18/2021    PROT 6.8 08/18/2021    PROT 7.0 03/07/2010    BILITOT 0.9 08/18/2021    BILIDIR 0.5 08/17/2021    IBILI 0.3 08/17/2021    LABALBU 3.2 08/18/2021         MICRO:  8/14     BC

## 2021-08-18 NOTE — PROGRESS NOTES
Hospitalist Progress Note      PCP: Parth Mckeon MD    Date of Admission: 8/15/2021    Chief Complaint: positive blood cx    Subjective: no new c/o. Severe Bilateral Pleuritic Chest pain w/ inspiration requiring IV Narcotic analgesia. Medications:  Reviewed    Infusion Medications    sodium chloride      sodium chloride 75 mL/hr at 08/17/21 2220     Scheduled Medications    atorvastatin  40 mg Oral Nightly    aspirin EC  81 mg Oral Daily    sodium chloride flush  5-40 mL Intravenous 2 times per day    enoxaparin  40 mg Subcutaneous Daily    penicillin G  4 Million Units Intravenous Q4H     PRN Meds: perflutren lipid microspheres, HYDROmorphone, oxyCODONE-acetaminophen, ketorolac, oxyCODONE-acetaminophen, sodium chloride flush, sodium chloride, ondansetron **OR** ondansetron, polyethylene glycol, acetaminophen **OR** acetaminophen, benzocaine-menthol, perflutren lipid microspheres    No intake or output data in the 24 hours ending 08/18/21 0910    Physical Exam Performed:    BP (!) 138/90   Pulse 108   Temp 98.3 °F (36.8 °C) (Oral)   Resp 16   Ht 6' (1.829 m)   Wt 178 lb (80.7 kg)   SpO2 93%   BMI 24.14 kg/m²     General appearance: No apparent distress, appears stated age and cooperative. HEENT: Pupils equal, round, and reactive to light. Conjunctivae/corneas clear. Neck: Supple, with full range of motion. No jugular venous distention. Trachea midline. Respiratory:  Normal respiratory effort. Clear to auscultation, bilaterally without Rales/Wheezes/Rhonchi. Cardiovascular: Regular rate and rhythm with normal S1/S2 without murmurs, rubs or gallops. Abdomen: Soft, non-tender, non-distended with normal bowel sounds. Musculoskeletal: No clubbing, cyanosis or edema bilaterally. Full range of motion without deformity. Skin: Skin color, texture, turgor normal.  No rashes or lesions. Neurologic:  Neurovascularly intact without any focal sensory/motor deficits.  Cranial nerves: II-XII intact, grossly non-focal.  Psychiatric: Alert and oriented, thought content appropriate, normal insight  Capillary Refill: Brisk,< 3 seconds   Peripheral Pulses: +2 palpable, equal bilaterally       Labs:   Recent Labs     08/15/21  2058 08/16/21  0616 08/18/21  0653   WBC 6.8 7.5 12.0*   HGB 14.1 12.4* 12.1*   HCT 40.4* 35.6* 35.1*    194 249     Recent Labs     08/15/21  2058 08/16/21  0616 08/18/21  0653   * 138 137   K 3.7 3.6 3.9   CL 97* 104 100   CO2 23 25 28   BUN 11 7 6*   CREATININE 0.9 0.9 0.8*   CALCIUM 9.3 8.5 8.8   PHOS  --   --  4.7     Recent Labs     08/15/21  2058 08/17/21  1449 08/18/21  0653   AST 62* 59* 54*   ALT 50* 71* 77*   BILIDIR  --  0.5*  --    BILITOT 0.8 0.8 0.9   ALKPHOS 136* 198* 202*     No results for input(s): INR in the last 72 hours. No results for input(s): Latrice Pace in the last 72 hours. Urinalysis:      Lab Results   Component Value Date    NITRU Negative 08/14/2021    WBCUA 6-9 08/14/2021    BACTERIA 3+ 08/14/2021    RBCUA 11-20 08/14/2021    BLOODU MODERATE 08/14/2021    SPECGRAV 1.025 08/14/2021    GLUCOSEU Negative 08/14/2021       Consults:    IP CONSULT TO HOSPITALIST  IP CONSULT TO PHARMACY  IP CONSULT TO INFECTIOUS DISEASES      Assessment/Plan:    Active Hospital Problems    Diagnosis     Pharyngitis due to Streptococcus species [J02.0]     S/P patent foramen ovale closure [Z87.74]     Streptococcal bacteremia [R78.81, B95.5]     Cerebrovascular accident (CVA) ([facility] [address] [I63.9]        Strep Bacteremia - w/ pharygitis. Infectious Disease consulted and appreciated. Currently on PCN started 16 August - continued. Chest CT Chest/Abd/Pelvis w/ pleural effusion - Pulmonolgy consulted and Drainage/CT pending per IR consult. Possible GARDENIA for Valvular vegetations. Sepsis - w/ Leukocytosis/Tachycardia/Fever POArrival 2nd to above infection. Continue IVF as appropriate and monitor clinical response w/ ABX as written.      COVID 19 - initially suspected, ruled out w/ both NEGATIVE NAAT/PCR. Hx CVA - s/p PFO closure controlled on ASA/Stating for 2nd risk reduction. Transaminitis - of unclear etiology but stable. Further w/up pending CT results to start. Will continue to follow serial labs. Reviewed and documented as above. DVT Prophylaxis: [facility]    Recent Labs     08/15/21  2058 08/16/21  0616 08/18/21  0653    194 249     Diet: ADULT DIET; Regular  Code Status: Full Code      PT/OT Eval Status: Not yet ordered. Dispo - Possibly Friday 20 August at the earliest - likely longer - pending clinical course and subspecialty recs.  Discussed w/ elisha Vincent and Father present at bedside 18 August.     Jolie Lombard, MD

## 2021-08-18 NOTE — ADT AUTH CERT
8/16    remains on m/s unit    mt yesterday 103.1    mt today 99.1 16 91 141/90  95% ra       bs 130   wbc 7.5   h&h 12.4/35.6       ID A/P:   HPI:     Sonam Merida is a 31yoM with history of CVA, s/p PFO closure 5/2019    ED 8/14/21 - 3d history of sore throat, headache, myalgia.  Pain near L SI joint. WBC was 11.9. CXR was clear. XR and CT of L spine negative. COVID NAAT was negative. Throat GABHS screen was positive and he was given IM bicillin.     Fever persisted.        He was called back to the ED the next day when [facility] turned positive for GABHS.     WBC is wnl. LFTs are slightly elevated. Today having pain in the chest w inspiration.     L back/hip pain is now absent.         S pyogenes bacteremia    Acute throat/neck pain with +throat GABHS test   Pleuritic chest pain with evolving, nodular multifocal opacities    Elevated LFTs   L hip/low back pain at onset of fever, now resolved.  No focal tenderness        History of PFO s/p repair         Medical decision making -    Streptococcal bacteremia as a complication of pharyngitis is very rare.     The evolving nodular lung infiltrates could be septic emboli.     COVID could be culprit though he has had 3 negative tests.  PCR is pending    Will need to rule out deep space neck infection and endovascular infection as cause or complication of streptococcal infection        -DC vanc and give IV pcn    -repeat BC    -get echo and CT neck   -trend LFTs   -isolation pending COVID PCR result    -if LFTs remain elevated with negative COVID test, he will need US or CT of liver          bld c/s x2 pend. COVID-19 not detected . . remains in droplet plus isolation (isolation d/c 8/17 am)       general diet    tylenol 650mg q6h prn . Celso Rathke x1    cepacol 1 q2h prn . Celso Rathke x1   iv toradol 30mg q6h prn pain . Celso Rathke x2    iv ms 4mg q4h prn pain . Celso Rathke  x3    cont home asa 81mg qd   cont home lipitor 40mg qhs   sq lovenox 40mg qd   d/c iv vanco    iv PCN G 4 million units q4h ivfs ns 75cc/hr       ct neck:   No acute abnormality of the soft tissue structures of the neck      D/c plan: home when medically stable

## 2021-08-18 NOTE — PROGRESS NOTES
PICC/CVC insertion Procedure Note    Haylee Seed   Admitted- 8/15/2021  8:05 PM  Admission diagnosis- Streptococcal bacteremia [R78.81, B95.5]      Attending Physician- Teresa Shoemaker MD  Ordering Physician- Dr. Eber Koehler  Indication for Insertion: Home Antibiotics    Lab Results   Component Value Date    INR 1.22 (H) 08/18/2021    PROTIME 13.9 (H) 08/18/2021     Lab Results   Component Value Date    WBC 12.0 (H) 08/18/2021    HGB 12.1 (L) 08/18/2021    HCT 35.1 (L) 08/18/2021     08/18/2021     Lab Results   Component Value Date    CREATININE 0.8 (L) 08/18/2021    BUN 6 (L) 08/18/2021    GFR >60 03/07/2010       Catheter Insertion Date- 8/18/2021   Catheter Brand- Arrow Antimicrobial/Antithrombogenic   Lot Number- [id number]  Lumen-Dual    Insertion Site- Left Basilic  Vein Diameter- .51 cm  Uzbek Size- 5.5  Catheter Length- 46 cm  Exposed Catheter Length- 0cm   PICC Tip Terminates in the Cavo Atrial Junction- Yes  Easy insertion- Yes  Able to Aspirate Blood- Yes  Easy Flush- Yes    PICC Placement Confirmation- xray  PICC insertion successful- Yes  Ultrasound- yes    Okay To Use PICC- \"Yes    NURSES:  *please replace all existing IV tubing with new IV tubing prior to using the PICC for current IV infusions. *please remove any PIVs from left arm. *please refrain from obtaining BPs in the left arm. All of the above may be sources of infection or damage to the PICC line/site.     Electronically signed by Radha Garcia, RN, RN on 8/18/2021 at 3:36 PM

## 2021-08-18 NOTE — PROGRESS NOTES
Pt has c/o of pain when coming down for PICC line. Pt given 1mg dilaudid. Order is for 2mg pt requested only 1mg. Jonas Jack RN made aware    Please make patient NPO at midnight for procedure tomorrow 8/19/21. Also hold thinners tomorrow morning.

## 2021-08-18 NOTE — CONSULTS
INPATIENT PULMONARY CRITICAL CARE CONSULT NOTE      Chief Complaint/Referring Provider:  Patient is being seen at the request of Dr. Florencio Enamorado for a consultation for loculated pleural effusion     Presenting HPI: Vinayak Babcock is a pleasant 67-year-old male living with his girlfriend [place], [place]. His father Olive Rubio was also at bedside. The patient is usually healthy, has had a finger injury and surgery, had a CVA in 2019 with loss of vision in his right eye. He has not recovered lateral vision. He was found to have a PFO, underwent closure at [facility] in 2019. According to his father, he had \"spinal meningitis\" when he was a baby. The patient has had no pulmonary issues such as asthma or pneumonia. He did smoke up to 1 pack/day for 10 to 15 years, quit in 2019 when he developed a stroke. He drinks about 6 beers in a week, does smoke marijuana he denies IV drug use or other recreational drug use. The patient felt unwell Wednesday/Thursday of last week with high fever up to 103.8 °F, sore throat. He was seen at an urgent care, was not prescribed an antibiotic as it was felt this was a viral episode. He did not feel any better, came to the ER at [facility]. He underwent a throat swab and blood cultures, was sent home. He was called back to get admitted as blood cultures were positive for group B streptococci. The patient has been hospitalized and receiving high-dose IV penicillin, Dr. Mabel Zarate from [facility] has been following. The patient began to developed predominantly left pleuritic chest pain, requiring narcotics. Repeat CT chest was done, CT abdomen and pelvis was included due to abnormal LFT. His fever has resolved. His appetite is fair. He has no GI or  complaints. We are consulted based on CT chest findings.        Patient Active Problem List    Diagnosis Date Noted    Pharyngitis due to Streptococcus species 08/16/2021    S/P patent foramen ovale closure 08/16/2021    Streptococcal bacteremia 08/15/2021    Sebaceous cyst     Closed displaced fracture of base of fourth metacarpal bone of right hand 2019    Closed displaced fracture of body of hamate of right wrist 2019    PFO (patent foramen ovale)     Cerebrovascular accident (CVA) ([facility] [address] 2019    Leukocytosis 2019    Head ache 2019    Acute medial meniscal tear 2014    ACL tear 2014       Past Medical History:   Diagnosis Date    CVA (cerebral vascular accident) ([facility] [address]     no residuals EXCEPT PERIPHERAL RT SIDE VISION    Hernia     History of bone graft     PFO (patent foramen ovale) 2019    PFO closure with 25 mm PFO occluder device    TMJ (dislocation of temporomandibular joint)         Past Surgical History:   Procedure Laterality Date    ANTERIOR CRUCIATE LIGAMENT REPAIR Left 9/10/14    BONE GRAFT      left wrist    CARDIAC SURGERY  2019    PFO  plACED AFTER STROKE    HAND SURGERY      right thumb pins    PRE-MALIGNANT / BENIGN SKIN LESION EXCISION N/A 2020    SEBACEOUS CYST EXCISION, POSTERIOR SCALP performed by Yisel Woodruff MD at [facility]        Family History   Problem Relation Age of Onset    Cancer Maternal Grandmother     Cancer Maternal Grandfather     Heart Failure Maternal Grandfather     Cancer Paternal Grandmother     High Blood Pressure Paternal Grandmother         MI    Cancer Paternal Grandfather     Other Paternal Grandfather         Social History     Tobacco Use    Smoking status: Former Smoker     Packs/day: 0.33     Types: Cigarettes     Quit date: 3/11/2019     Years since quittin.4    Smokeless tobacco: Former User     Types: Snuff    Tobacco comment: quit 3/2019   Substance Use Topics    Alcohol use:  Yes     Alcohol/week: 6.0 standard drinks     Types: 6 Cans of beer per week     Comment: 6 drinks per week        No Known Allergies      Physical Exam:  Blood pressure (!) 143/99, pulse 99, temperature 98 °F (36.7 °C), temperature source Oral, resp. rate 16, height 6' (1.829 m), weight 178 lb (80.7 kg), SpO2 92 %.'   Constitutional: Pleasant, averagely built, not ill-appearing. In no acute distress. HENT:  Oropharynx is clear and moist.  Class II airway, no dental lesions. Eyes:  Conjunctivae are normal. Pupils equal, round, and reactive to light. No scleral icterus. Neck: No JVD. No tracheal deviation present. No obvious thyroid mass. Cardiovascular: Normal rate, regular rhythm, normal heart sounds. No right ventricular heave. No lower extremity edema. Pulmonary/Chest: No wheezes. No rales. Chest wall is dull to percussion left infrascapular region. No accessory muscle usage or stridor. Abdominal: Deferred. Musculoskeletal: No cyanosis. No clubbing. No obvious joint deformity. Lymphadenopathy: No cervical or supraclavicular adenopathy. Skin: Skin is warm and dry. No rash or nodules on the exposed extremities. Psychiatric: Normal mood and affect. Behavior is normal.  No anxiety. Neurologic: Alert, awake and oriented. PERRL. Speech fluent. No obvious neurologic deficit, detailed exam not performed      Results:  CBC:   Recent Labs     08/15/21  2058 08/16/21  0616 08/18/21  0653   WBC 6.8 7.5 12.0*   HGB 14.1 12.4* 12.1*   HCT 40.4* 35.6* 35.1*   MCV 86.7 86.8 88.4    194 249     BMP:   Recent Labs     08/15/21  2058 08/16/21  0616 08/18/21  0653   * 138 137   K 3.7 3.6 3.9   CL 97* 104 100   CO2 23 25 28   PHOS  --   --  4.7   BUN 11 7 6*   CREATININE 0.9 0.9 0.8*     LIVER PROFILE:   Recent Labs     08/15/21  2058 08/17/21  1449 08/18/21  0653   AST 62* 59* 54*   ALT 50* 71* 77*   BILIDIR  --  0.5*  --    BILITOT 0.8 0.8 0.9   ALKPHOS 136* 198* 202*       Imaging:  I have reviewed radiology images personally. CT CHEST ABDOMEN PELVIS W CONTRAST   Final Result   1. Significant worsening of left lower lobe pneumonia with developing   loculated parapneumonic effusion.    2. Several rapidly enlarging right-sided subpleural pulmonary nodules. Septic emboli are a diagnostic consideration. 3. No acute abdominopelvic abnormality. XR CHEST (2 VW)   Final Result   Increased bibasilar airspace disease with small left-sided pleural effusion         CT SOFT TISSUE NECK WO CONTRAST   Final Result   No acute abnormality of the soft tissue structures of the neck. CT CHEST PULMONARY EMBOLISM W CONTRAST   Final Result   1. Multifocal airspace disease, including scattered nodular airspace disease. Correlate with presentation. Follow-up to resolution recommended. 2. Suboptimal opacification of the pulmonary arteries. No acute pulmonary   embolism to the lobar arteries given limitation. 3. Other findings as described. IR PICC WO SQ PORT/PUMP > 5 YEARS    (Results Pending)     Echo Complete    Result Date: 8/18/2021  Transthoracic Echocardiography Report (TTE)  Demographics   Patient Name       Paulette Corcoran   Date of Study      08/18/2021         Gender              Male   Patient Number     [id number]         Date of Birth       1985   Visit Number       [id number]          Age                 30 year(s)   Accession Number   [id number]         Room Number         0522   Corporate ID       [id number]           Sonographer         Alexus Garcia [facility]   Ordering Physician Nitza Jeffers MD       Physician           Catrina Rivas MD, [facility]  Procedure Type of Study   TTE procedure:ECHOCARDIOGRAM COMPLETE 2D W DOPPLER W COLOR. Procedure Date Date: 08/18/2021 Start: 08:19 AM Study Location: [address] [place] [address] - Echo Lab Technical Quality: Adequate visualization Additional Indications:Bacteremia.  Patient Status: Routine Height: 72 inches Weight: 178 pounds BSA: 2.03 m2 BMI: 24.14 kg/m2 BP: 138/90 mmHg  Conclusions   Summary  Normal LV systolic function Pulmonic Valve  The pulmonic valve is normal in structure and function. No evidence of pulmonic regurgitation. Pericardial Effusion  No pericardial effusion noted. Pleural Effusion  No pleural effusion noted. Miscellaneous  A 25 mm Amplatzer closure device is visualized and appears well seated. No  obvious shunt from color flow. The IVC is dilated in size (>2.1 cm) and collapses <50% with respiration  consistent with markedly elevated right atrial pressures (15 mmHg) . No obvious masses, thrombi, or vegetations are noted. M-Mode/2D Measurements (cm)   LV Diastolic Dimension: 6.42 cm LV Systolic Dimension: 8.52 cm  LV Septum Diastolic: 6.66 cm  LV PW Diastolic: 0.9 cm         AO Root Dimension: 3.5 cm                                  LA Dimension: 3.7 cm                                  LA Area: 22 cm2  LVOT: 2 cm                      LA volume/Index: 68.85 ml /34 ml/m2  Doppler Measurements   AV Peak Velocity: 136 cm/s     MV Peak E-Wave: 87.4 cm/s  AV Peak Gradient: 7.4 mmHg     MV Peak A-Wave: 70.3 cm/s  LVOT Peak Velocity: 124 cm/s   MV E/A Ratio: 1.24   TR Velocity:337 cm/s  TR Gradient:45.43 mmHg  Estimated RAP:15 mmHg  Estimated RVSP: 60 mmHg  E' Septal Velocity: 11.1 cm/s  E' Lateral Velocity: 16.6 cm/s  E/E' ratio: 6.6   Aortic Valve   Peak Velocity: 136 cm/s  Peak Gradient: 7.4 mmHg  Aorta   Aortic Root: 3.5 cm  LVOT Diameter: 2 cm      XR CHEST (2 VW)    Result Date: 8/17/2021  EXAMINATION: TWO XRAY VIEWS OF THE CHEST 8/17/2021 2:58 pm COMPARISON: 08/14/2020 HISTORY: ORDERING SYSTEM PROVIDED HISTORY: pleuritic pain TECHNOLOGIST PROVIDED HISTORY: Reason for exam:->pleuritic pain Reason for Exam: chest pain Acuity: Acute Type of Exam: Subsequent/Follow-up FINDINGS: Lung volumes are low accentuating heart size and bronchovascular markings at the lung bases. Small left-sided pleural effusion is seen.   There is adjacent left basilar consolidation There is hazy right basilar opacity Amplatzer device is seen.     Increased bibasilar airspace disease with small left-sided pleural effusion     XR CHEST (2 VW)    Result Date: 8/14/2021  EXAMINATION: TWO XRAY VIEWS OF THE CHEST 8/14/2021 5:46 pm COMPARISON: Chest radiograph performed 10/23/2020. HISTORY: ORDERING SYSTEM PROVIDED HISTORY: SOB TECHNOLOGIST PROVIDED HISTORY: Reason for exam:->SOB Reason for Exam: cp, sob, tachycardia started 2-3 days ago Acuity: Acute Type of Exam: Initial FINDINGS: There is no acute consolidation or effusion. There is no pneumothorax. The mediastinal structures are unremarkable. The upper abdomen is unremarkable. The extrathoracic soft tissues are unremarkable. There is no acute osseous abnormality. No acute cardiopulmonary process. XR LUMBAR SPINE (2-3 VIEWS)    Result Date: 8/14/2021  EXAMINATION: THREE XRAY VIEWS OF THE LUMBAR SPINE 8/14/2021 6:18 pm COMPARISON: None. HISTORY: ORDERING SYSTEM PROVIDED HISTORY: low back pain TECHNOLOGIST PROVIDED HISTORY: Reason for exam:->low back pain Reason for Exam: low back for a few days; no injury Acuity: Acute Type of Exam: Initial FINDINGS: The vertebral body heights are maintained. The disc spaces are maintained. There is no spondylolisthesis. The visualized bony pelvis is intact. The surrounding soft tissues are unremarkable. No acute vertebral body or disc space abnormality. CT SOFT TISSUE NECK WO CONTRAST    Result Date: 8/16/2021  EXAMINATION: CT OF THE NECK WITHOUT CONTRAST  8/16/2021 TECHNIQUE: CT of the neck was performed without the administration of intravenous contrast. Multiplanar reformatted images are provided for review. Dose modulation, iterative reconstruction, and/or weight based adjustment of the mA/kV was utilized to reduce the radiation dose to as low as reasonably achievable. COMPARISON: None.  HISTORY: ORDERING SYSTEM PROVIDED HISTORY: evaluate for suppurative complication of pharyngitis TECHNOLOGIST PROVIDED HISTORY: Reason for exam:->evaluate for suppurative complication of pharyngitis Reason for Exam: sore throat, swollen rt node, difficulty swallowing x 6 days Acuity: Acute Type of Exam: Initial FINDINGS: PHARYNX/LARYNX:  The palatine tonsils are normal in appearance. The tongue is normal in appearance. The valleculae, epiglottis, aryepiglottic folds and pyriform sinuses appear unremarkable. The true and false vocal cords are normal in appearance. No mass or abscess is seen. SALIVARY GLANDS/THYROID:  The parotid and submandibular glands appear unremarkable. The thyroid gland appears unremarkable. LYMPH NODES:  No cervical or supraclavicular lymphadenopathy is seen. SOFT TISSUES:  No appreciable soft tissue swelling or mass is seen. BRAIN/ORBITS/SINUSES:  The visualized portion of the intracranial contents appear unremarkable. The visualized portion of the orbits, paranasal sinuses and mastoid air cells demonstrate no acute abnormality. LUNG APICES/SUPERIOR MEDIASTINUM:  No focal consolidation is seen within the visualized lung apices. No superior mediastinal lymphadenopathy or mass. The visualized portion of the trachea appears unremarkable. BONES:  No aggressive appearing lytic or blastic bony lesion. No acute abnormality of the soft tissue structures of the neck. CT LUMBAR SPINE W CONTRAST    Result Date: 8/14/2021  EXAMINATION: CT OF THE LUMBAR SPINE WITH CONTRAST  8/14/2021 9:12 pm TECHNIQUE: CT of the lumbar spine was performed with the administration of intravenous contrast. Multiplanar reformatted images are provided for review. Dose modulation, iterative reconstruction, and/or weight based adjustment of the mA/kV was utilized to reduce the radiation dose to as low as reasonably achievable.  COMPARISON: None HISTORY: ORDERING SYSTEM PROVIDED HISTORY: low back pain, fever TECHNOLOGIST PROVIDED HISTORY: Reason for exam:->low back pain, fever Decision Support Exception - unselect if not a suspected or confirmed emergency medical condition->Emergency Medical Condition (MA) Reason for Exam: severe b ack pain x few days. more on the left side. fever, sore throath Acuity: Acute Type of Exam: Initial FINDINGS: BONES/ALIGNMENT:  There is normal alignment of the spine. The vertebral body heights are maintained. No osseous destructive lesion is seen. SOFT TISSUES: No acute paraspinal soft tissue abnormality. No abnormal fluid collection or enhancement. Incidental 8 mm left renal cortical cyst appears benign. L1-L2: There is no significant disc protrusion, central spinal canal stenosis or neural foraminal narrowing. L2-L3: There is no significant disc protrusion, central spinal canal stenosis or neural foraminal narrowing. L3-L4: There is no significant disc protrusion, central spinal canal stenosis or neural foraminal narrowing. L4-L5: There is no significant disc protrusion, central spinal canal stenosis or neural foraminal narrowing. L5-S1: There is no significant disc protrusion, central spinal canal stenosis or neural foraminal narrowing. Unremarkable contrast enhanced CT of the lumbosacral spine. CT CHEST PULMONARY EMBOLISM W CONTRAST    Result Date: 8/15/2021  EXAMINATION: CTA OF THE CHEST 8/15/2021 9:56 pm TECHNIQUE: CTA of the chest was performed after the administration of intravenous contrast.  Multiplanar reformatted images are provided for review. MIP images are provided for review. Dose modulation, iterative reconstruction, and/or weight based adjustment of the mA/kV was utilized to reduce the radiation dose to as low as reasonably achievable. COMPARISON: None.  HISTORY: ORDERING SYSTEM PROVIDED HISTORY: pleuritic chest pain , left TECHNOLOGIST PROVIDED HISTORY: Reason for exam:->pleuritic chest pain , left Decision Support Exception - unselect if not a suspected or confirmed emergency medical condition->Emergency Medical Condition (MA) Reason for Exam: left sided back and chest pain Acuity: Acute Type of Exam: Initial FINDINGS: Pulmonary Arteries: Pulmonary arteries are within normal limits in size. . Suboptimal opacification of the pulmonary arteries. No filling defect is identified in the pulmonary arteries to the level of thelobar arteries. Mediastinum: Heart is borderline enlarged. No pericardial effusion. Motion degradation precludes evaluation of the aortic root and ascending aorta. The descending thoracic aorta is within normal limits in size. No luminal defect is appreciated in the descending thoracic aorta. Inter atrial closure device noted. Lungs/pleura: Central airways are patent. Ground-glass the confluent airspace disease noted. Scattered nodular airspace disease noted. No pleural effusion. No pneumothorax. Soft Tissues/Bones: No adenopathy. Calcified lymph nodes in left hilum. Upper Abdomen: Limited images of the upper abdomen are unremarkable. 1. Multifocal airspace disease, including scattered nodular airspace disease. Correlate with presentation. Follow-up to resolution recommended. 2. Suboptimal opacification of the pulmonary arteries. No acute pulmonary embolism to the lobar arteries given limitation. 3. Other findings as described. CT CHEST ABDOMEN PELVIS W CONTRAST    Result Date: 8/18/2021  EXAMINATION: CT OF THE CHEST, ABDOMEN, AND PELVIS WITH CONTRAST 8/18/2021 11:04 am TECHNIQUE: CT of the chest, abdomen and pelvis was performed with the administration of intravenous contrast. Multiplanar reformatted images are provided for review. Dose modulation, iterative reconstruction, and/or weight based adjustment of the mA/kV was utilized to reduce the radiation dose to as low as reasonably achievable. COMPARISON: 08/15/2021 HISTORY: ORDERING SYSTEM PROVIDED HISTORY: elevated LFTs, pna w effusion TECHNOLOGIST PROVIDED HISTORY: Reason for exam:->elevated LFTs, pna w effusion Additional Contrast?->Oral FINDINGS: Chest: Mediastinum: The central airways are patent.   There is no hilar or mediastinal adenopathy. Lungs/pleura: Dense consolidation has significantly progressed throughout the left lower lobe with central air bronchograms. A small to moderate-sized multiloculated left pleural effusion is now noted. There is patchy consolidation within the right middle lobe and right posterior costophrenic angle. Several small noncalcified right-sided pleural based soft tissue nodules are have doubled in size over the interval with the largest now measuring 11 mm. Soft Tissues/Bones: There is no acute fracture or aggressive osseous lesion. Abdomen/Pelvis: Organs: The liver, pancreas, spleen, kidneys and adrenals are unremarkable. GI/Bowel: There is no bowel dilatation, wall thickening or obstruction. Pelvis: The bladder and prostate are unremarkable. Peritoneum/Retroperitoneum: There is no free air, free fluid or intraperitoneal inflammatory change. There is no adenopathy. Bones/Soft Tissues: There is no acute fracture or aggressive osseous lesion. 1. Significant worsening of left lower lobe pneumonia with developing loculated parapneumonic effusion. 2. Several rapidly enlarging right-sided subpleural pulmonary nodules. Septic emboli are a diagnostic consideration. 3. No acute abdominopelvic abnormality. Echocardiogram 5/11/21:  A 25 mm Amplatzer closure device is visualized and appears well seated with no evidence of shunting noted by color flow doppler of  the interatrial septum. An agitated saline study was performed and showed a delayed (>5 beats) positive result suggestive of an intrapulmonary shunt. Normal left ventricle size, wall thickness, and systolic function with a visually estimated ejection fraction of 55%. No regional wall motion abnormalities are seen. Normal left ventricular diastolic filling pressure. Inadequate tricuspid valve regurgitation to estimate systolic pulmonary artery pressure. PFT:  None in EMR      Assessment and plan:  Left empyema. Needs drainage by IR.   Procedure discussed with the patient and his family. INR being checked. Studies ordered. May need fibrinolytic treatment. ?  Septic emboli. Classic appearance on CT, source unclear. Possibly associated with bacteremia/infective endocarditis. Will discuss with cardiology about the role of G[place]EN[place] with vegetation on the ASD closure device  Group B streptococcus bacteremia. On high-dose penicillin G, Dr. Gonzales Else following. Leukocytosis. Mild. Anemia. Normochromic, normocytic. Mild. ?  Microscopic hematuria. The patient denies any symptoms. Can be seen with endocarditis   CVA, ASD closure. Had no shunt on echocardiogram in May 2021. Abnormal LFT. Unclear etiology  DVT prophylaxis. Hold Lovenox until chest tube completed      I have examined the patient, reviewed labs, images and medical records. CT images shown to the patient and his family. My impression and recommendations are as above. We will follow with you, thank you for the consultation.       Electronically signed by:  Dariel Foss MD    8/18/2021    12:46 PM.

## 2021-08-18 NOTE — PROGRESS NOTES
1mg of dilaudid wasted with Dearl DENTON Andrade. 2mg ordered however per pt request he only wanted to try 1mg.

## 2021-08-19 ENCOUNTER — ANESTHESIA EVENT (OUTPATIENT)
Dept: CARDIAC CATH/INVASIVE PROCEDURES | Age: 36
DRG: 853 | End: 2021-08-19
Payer: COMMERCIAL

## 2021-08-19 ENCOUNTER — APPOINTMENT (OUTPATIENT)
Dept: CT IMAGING | Age: 36
DRG: 853 | End: 2021-08-19
Payer: COMMERCIAL

## 2021-08-19 ENCOUNTER — APPOINTMENT (OUTPATIENT)
Dept: CARDIAC CATH/INVASIVE PROCEDURES | Age: 36
DRG: 853 | End: 2021-08-19
Payer: COMMERCIAL

## 2021-08-19 ENCOUNTER — ANESTHESIA (OUTPATIENT)
Dept: CARDIAC CATH/INVASIVE PROCEDURES | Age: 36
DRG: 853 | End: 2021-08-19
Payer: COMMERCIAL

## 2021-08-19 VITALS — DIASTOLIC BLOOD PRESSURE: 71 MMHG | SYSTOLIC BLOOD PRESSURE: 114 MMHG | OXYGEN SATURATION: 98 %

## 2021-08-19 LAB
A/G RATIO: 0.8 (ref 1.1–2.2)
ALBUMIN SERPL-MCNC: 2.8 G/DL (ref 3.4–5)
ALP BLD-CCNC: 176 U/L (ref 40–129)
ALT SERPL-CCNC: 62 U/L (ref 10–40)
ANION GAP SERPL CALCULATED.3IONS-SCNC: 9 MMOL/L (ref 3–16)
AST SERPL-CCNC: 37 U/L (ref 15–37)
BANDED NEUTROPHILS RELATIVE PERCENT: 9 % (ref 0–7)
BASOPHILS ABSOLUTE: 0 K/UL (ref 0–0.2)
BASOPHILS RELATIVE PERCENT: 0 %
BILIRUB SERPL-MCNC: 0.9 MG/DL (ref 0–1)
BUN BLDV-MCNC: 5 MG/DL (ref 7–20)
CALCIUM SERPL-MCNC: 8.2 MG/DL (ref 8.3–10.6)
CHLORIDE BLD-SCNC: 102 MMOL/L (ref 99–110)
CO2: 26 MMOL/L (ref 21–32)
CREAT SERPL-MCNC: 0.8 MG/DL (ref 0.9–1.3)
EOSINOPHILS ABSOLUTE: 0.3 K/UL (ref 0–0.6)
EOSINOPHILS RELATIVE PERCENT: 2 %
GFR AFRICAN AMERICAN: >60
GFR NON-AFRICAN AMERICAN: >60
GLOBULIN: 3.6 G/DL
GLUCOSE BLD-MCNC: 133 MG/DL (ref 70–99)
HCT VFR BLD CALC: 34.1 % (ref 40.5–52.5)
HEMOGLOBIN: 11.5 G/DL (ref 13.5–17.5)
LV EF: 55 %
LVEF MODALITY: NORMAL
LYMPHOCYTES ABSOLUTE: 2.8 K/UL (ref 1–5.1)
LYMPHOCYTES RELATIVE PERCENT: 17 %
MCH RBC QN AUTO: 29.9 PG (ref 26–34)
MCHC RBC AUTO-ENTMCNC: 33.8 G/DL (ref 31–36)
MCV RBC AUTO: 88.7 FL (ref 80–100)
MONOCYTES ABSOLUTE: 1.6 K/UL (ref 0–1.3)
MONOCYTES RELATIVE PERCENT: 10 %
NEUTROPHILS ABSOLUTE: 11.6 K/UL (ref 1.7–7.7)
NEUTROPHILS RELATIVE PERCENT: 62 %
PDW BLD-RTO: 14 % (ref 12.4–15.4)
PLATELET # BLD: 314 K/UL (ref 135–450)
PLATELET SLIDE REVIEW: ADEQUATE
PMV BLD AUTO: 7 FL (ref 5–10.5)
POTASSIUM SERPL-SCNC: 4.2 MMOL/L (ref 3.5–5.1)
RBC # BLD: 3.85 M/UL (ref 4.2–5.9)
RBC # BLD: NORMAL 10*6/UL
SLIDE REVIEW: ABNORMAL
SODIUM BLD-SCNC: 137 MMOL/L (ref 136–145)
TOTAL PROTEIN: 6.4 G/DL (ref 6.4–8.2)
TOXIC GRANULATION: PRESENT
WBC # BLD: 16.4 K/UL (ref 4–11)

## 2021-08-19 PROCEDURE — 94761 N-INVAS EAR/PLS OXIMETRY MLT: CPT

## 2021-08-19 PROCEDURE — 2580000003 HC RX 258: Performed by: INTERNAL MEDICINE

## 2021-08-19 PROCEDURE — 85025 COMPLETE CBC W/AUTO DIFF WBC: CPT

## 2021-08-19 PROCEDURE — 6360000002 HC RX W HCPCS: Performed by: INTERNAL MEDICINE

## 2021-08-19 PROCEDURE — 93315 ECHO TRANSESOPHAGEAL: CPT

## 2021-08-19 PROCEDURE — 93320 DOPPLER ECHO COMPLETE: CPT

## 2021-08-19 PROCEDURE — 32557 INSERT CATH PLEURA W/ IMAGE: CPT

## 2021-08-19 PROCEDURE — 2500000003 HC RX 250 WO HCPCS: Performed by: NURSE ANESTHETIST, CERTIFIED REGISTERED

## 2021-08-19 PROCEDURE — 87075 CULTR BACTERIA EXCEPT BLOOD: CPT

## 2021-08-19 PROCEDURE — 93325 DOPPLER ECHO COLOR FLOW MAPG: CPT

## 2021-08-19 PROCEDURE — 6370000000 HC RX 637 (ALT 250 FOR IP): Performed by: INTERNAL MEDICINE

## 2021-08-19 PROCEDURE — 89051 BODY FLUID CELL COUNT: CPT

## 2021-08-19 PROCEDURE — 3700000000 HC ANESTHESIA ATTENDED CARE

## 2021-08-19 PROCEDURE — 87205 SMEAR GRAM STAIN: CPT

## 2021-08-19 PROCEDURE — 83615 LACTATE (LD) (LDH) ENZYME: CPT

## 2021-08-19 PROCEDURE — 6360000002 HC RX W HCPCS: Performed by: NURSE ANESTHETIST, CERTIFIED REGISTERED

## 2021-08-19 PROCEDURE — 84157 ASSAY OF PROTEIN OTHER: CPT

## 2021-08-19 PROCEDURE — 80053 COMPREHEN METABOLIC PANEL: CPT

## 2021-08-19 PROCEDURE — 87070 CULTURE OTHR SPECIMN AEROBIC: CPT

## 2021-08-19 PROCEDURE — 2700000000 HC OXYGEN THERAPY PER DAY

## 2021-08-19 PROCEDURE — 3700000001 HC ADD 15 MINUTES (ANESTHESIA)

## 2021-08-19 PROCEDURE — 99232 SBSQ HOSP IP/OBS MODERATE 35: CPT | Performed by: INTERNAL MEDICINE

## 2021-08-19 PROCEDURE — 0W9B30Z DRAINAGE OF LEFT PLEURAL CAVITY WITH DRAINAGE DEVICE, PERCUTANEOUS APPROACH: ICD-10-PCS | Performed by: RADIOLOGY

## 2021-08-19 PROCEDURE — 32551 INSERTION OF CHEST TUBE: CPT

## 2021-08-19 PROCEDURE — 6370000000 HC RX 637 (ALT 250 FOR IP): Performed by: NURSE PRACTITIONER

## 2021-08-19 PROCEDURE — 6360000002 HC RX W HCPCS: Performed by: RADIOLOGY

## 2021-08-19 PROCEDURE — 36592 COLLECT BLOOD FROM PICC: CPT

## 2021-08-19 PROCEDURE — 1200000000 HC SEMI PRIVATE

## 2021-08-19 PROCEDURE — 82945 GLUCOSE OTHER FLUID: CPT

## 2021-08-19 RX ORDER — PROPOFOL 10 MG/ML
INJECTION, EMULSION INTRAVENOUS PRN
Status: DISCONTINUED | OUTPATIENT
Start: 2021-08-19 | End: 2021-08-19 | Stop reason: SDUPTHER

## 2021-08-19 RX ORDER — OXYCODONE HYDROCHLORIDE AND ACETAMINOPHEN 5; 325 MG/1; MG/1
2 TABLET ORAL EVERY 4 HOURS PRN
Status: COMPLETED | OUTPATIENT
Start: 2021-08-19 | End: 2021-08-19

## 2021-08-19 RX ORDER — LIDOCAINE HYDROCHLORIDE 20 MG/ML
INJECTION, SOLUTION INFILTRATION; PERINEURAL PRN
Status: DISCONTINUED | OUTPATIENT
Start: 2021-08-19 | End: 2021-08-19 | Stop reason: SDUPTHER

## 2021-08-19 RX ORDER — OXYCODONE HYDROCHLORIDE AND ACETAMINOPHEN 5; 325 MG/1; MG/1
2 TABLET ORAL EVERY 4 HOURS PRN
Status: COMPLETED | OUTPATIENT
Start: 2021-08-19 | End: 2021-08-20

## 2021-08-19 RX ORDER — DIPHENHYDRAMINE HYDROCHLORIDE 50 MG/ML
INJECTION INTRAMUSCULAR; INTRAVENOUS
Status: COMPLETED | OUTPATIENT
Start: 2021-08-19 | End: 2021-08-19

## 2021-08-19 RX ORDER — HYDRALAZINE HYDROCHLORIDE 20 MG/ML
10 INJECTION INTRAMUSCULAR; INTRAVENOUS EVERY 4 HOURS PRN
Status: DISCONTINUED | OUTPATIENT
Start: 2021-08-19 | End: 2021-08-24

## 2021-08-19 RX ORDER — FENTANYL CITRATE 50 UG/ML
INJECTION, SOLUTION INTRAMUSCULAR; INTRAVENOUS
Status: COMPLETED | OUTPATIENT
Start: 2021-08-19 | End: 2021-08-19

## 2021-08-19 RX ORDER — MIDAZOLAM HYDROCHLORIDE 5 MG/ML
INJECTION INTRAMUSCULAR; INTRAVENOUS
Status: COMPLETED | OUTPATIENT
Start: 2021-08-19 | End: 2021-08-19

## 2021-08-19 RX ADMIN — HYDROMORPHONE HYDROCHLORIDE 1 MG: 1 INJECTION, SOLUTION INTRAMUSCULAR; INTRAVENOUS; SUBCUTANEOUS at 11:37

## 2021-08-19 RX ADMIN — FENTANYL CITRATE 100 MCG: 50 INJECTION INTRAMUSCULAR; INTRAVENOUS at 12:23

## 2021-08-19 RX ADMIN — PROPOFOL 50 MG: 10 INJECTION, EMULSION INTRAVENOUS at 09:27

## 2021-08-19 RX ADMIN — DEXTROSE MONOHYDRATE 4 MILLION UNITS: 5 INJECTION INTRAVENOUS at 14:54

## 2021-08-19 RX ADMIN — HYDROMORPHONE HYDROCHLORIDE 1 MG: 2 INJECTION, SOLUTION INTRAMUSCULAR; INTRAVENOUS; SUBCUTANEOUS at 03:38

## 2021-08-19 RX ADMIN — PROPOFOL 150 MG: 10 INJECTION, EMULSION INTRAVENOUS at 09:24

## 2021-08-19 RX ADMIN — MIDAZOLAM HYDROCHLORIDE 2 MG: 5 INJECTION, SOLUTION INTRAMUSCULAR; INTRAVENOUS at 12:23

## 2021-08-19 RX ADMIN — ONDANSETRON 4 MG: 2 INJECTION INTRAMUSCULAR; INTRAVENOUS at 11:39

## 2021-08-19 RX ADMIN — KETOROLAC TROMETHAMINE 30 MG: 30 INJECTION, SOLUTION INTRAMUSCULAR; INTRAVENOUS at 05:28

## 2021-08-19 RX ADMIN — DEXTROSE MONOHYDRATE 4 MILLION UNITS: 5 INJECTION INTRAVENOUS at 06:45

## 2021-08-19 RX ADMIN — PROPOFOL 50 MG: 10 INJECTION, EMULSION INTRAVENOUS at 09:32

## 2021-08-19 RX ADMIN — METOPROLOL TARTRATE 25 MG: 25 TABLET, FILM COATED ORAL at 16:03

## 2021-08-19 RX ADMIN — OXYCODONE HYDROCHLORIDE AND ACETAMINOPHEN 2 TABLET: 5; 325 TABLET ORAL at 06:44

## 2021-08-19 RX ADMIN — OXYCODONE HYDROCHLORIDE AND ACETAMINOPHEN 1 TABLET: 5; 325 TABLET ORAL at 00:38

## 2021-08-19 RX ADMIN — PROPOFOL 50 MG: 10 INJECTION, EMULSION INTRAVENOUS at 09:36

## 2021-08-19 RX ADMIN — HYDROMORPHONE HYDROCHLORIDE 1 MG: 1 INJECTION, SOLUTION INTRAMUSCULAR; INTRAVENOUS; SUBCUTANEOUS at 14:50

## 2021-08-19 RX ADMIN — SODIUM CHLORIDE: 9 INJECTION, SOLUTION INTRAVENOUS at 05:30

## 2021-08-19 RX ADMIN — DEXTROSE MONOHYDRATE 4 MILLION UNITS: 5 INJECTION INTRAVENOUS at 18:59

## 2021-08-19 RX ADMIN — LIDOCAINE HYDROCHLORIDE 60 MG: 20 INJECTION, SOLUTION INFILTRATION; PERINEURAL at 09:24

## 2021-08-19 RX ADMIN — HYDROMORPHONE HYDROCHLORIDE 1 MG: 1 INJECTION, SOLUTION INTRAMUSCULAR; INTRAVENOUS; SUBCUTANEOUS at 23:21

## 2021-08-19 RX ADMIN — OXYCODONE HYDROCHLORIDE AND ACETAMINOPHEN 1 TABLET: 5; 325 TABLET ORAL at 19:39

## 2021-08-19 RX ADMIN — DEXTROSE MONOHYDRATE 4 MILLION UNITS: 5 INJECTION INTRAVENOUS at 11:22

## 2021-08-19 RX ADMIN — ENOXAPARIN SODIUM 40 MG: 40 INJECTION SUBCUTANEOUS at 17:09

## 2021-08-19 RX ADMIN — SODIUM CHLORIDE: 9 INJECTION, SOLUTION INTRAVENOUS at 23:20

## 2021-08-19 RX ADMIN — DIPHENHYDRAMINE HYDROCHLORIDE 50 MG: 50 INJECTION, SOLUTION INTRAMUSCULAR; INTRAVENOUS at 12:23

## 2021-08-19 RX ADMIN — DEXTROSE MONOHYDRATE 4 MILLION UNITS: 5 INJECTION INTRAVENOUS at 03:01

## 2021-08-19 RX ADMIN — ATORVASTATIN CALCIUM 40 MG: 10 TABLET, FILM COATED ORAL at 20:28

## 2021-08-19 RX ADMIN — KETOROLAC TROMETHAMINE 30 MG: 30 INJECTION, SOLUTION INTRAMUSCULAR; INTRAVENOUS at 19:40

## 2021-08-19 RX ADMIN — OXYCODONE HYDROCHLORIDE AND ACETAMINOPHEN 2 TABLET: 5; 325 TABLET ORAL at 16:14

## 2021-08-19 ASSESSMENT — PAIN SCALES - GENERAL
PAINLEVEL_OUTOF10: 8
PAINLEVEL_OUTOF10: 7
PAINLEVEL_OUTOF10: 8
PAINLEVEL_OUTOF10: 8
PAINLEVEL_OUTOF10: 7
PAINLEVEL_OUTOF10: 5
PAINLEVEL_OUTOF10: 6
PAINLEVEL_OUTOF10: 6

## 2021-08-19 ASSESSMENT — ENCOUNTER SYMPTOMS: TACHYPNEA: 1

## 2021-08-19 ASSESSMENT — PAIN DESCRIPTION - PAIN TYPE: TYPE: ACUTE PAIN

## 2021-08-19 ASSESSMENT — PAIN DESCRIPTION - ORIENTATION: ORIENTATION: RIGHT;LEFT

## 2021-08-19 ASSESSMENT — PAIN DESCRIPTION - LOCATION: LOCATION: RIB CAGE

## 2021-08-19 NOTE — SEDATION DOCUMENTATION
Image guided CHEST TUBE insertion left completed. Dr. Michael Jeffery placed 10 Greenlandic 25 cm EXODUS ARRAY MULTIPURPOSE DRAINAGE CATH WITH TIGHT PIGTAIL LOT # [id number] EXP 11/30/2023 in the left. Drain/tube secured at the 25.5 cm line with SUTURES. 100 milliliters of YELLOW colored withdrawn immediately. Specimen sent to lab for culture. Drain/tube dressing clean, dry, and intact. Pt tolerated procedure without any signs or symptoms of distress. Vital signs stable. Report called to  RN. Pt transported back to [facility] in stable condition via bed by transport.      Medications  Versed: 2 mg  Fentanyl: 100 mcg  BENADRYL: 50MG    Vital Signs  Vitals:    08/19/21 1242   BP: (!) 146/113   Pulse: 113   Resp: 21   Temp:    SpO2: 100%    (vital signs in table format)    Post Ismael  2 - Able to move 4 extremities voluntarily on command  2 - BP+/- 20mmHg of normal  2 - Fully awake  2 - Able to maintain oxygen saturation >92% on room air  2 - Able to breathe deeply and cough freely

## 2021-08-19 NOTE — ANESTHESIA PRE PROCEDURE
Department of Anesthesiology  Preprocedure Note       Name:  Lefty Horton   Age:  30 y.o.  :  1985                                          MRN:  [id number]         Date:  2021      Surgeon: * No surgeons listed *    Procedure: * No procedures listed *    Medications prior to admission:   Prior to Admission medications    Medication Sig Start Date End Date Taking?  Authorizing Provider   aspirin EC 81 MG EC tablet Take 1 tablet by mouth daily 11/3/20  Yes Nalini Carrillo MD   atorvastatin (LIPITOR) 40 MG tablet Take 1 tablet by mouth nightly 19  Yes Nalini Carrillo MD       Current medications:    Current Facility-Administered Medications   Medication Dose Route Frequency Provider Last Rate Last Admin    oxyCODONE-acetaminophen (PERCOCET) 5-325 MG per tablet 2 tablet  2 tablet Oral Q4H PRN JAMES Street - NP   2 tablet at 21 0644    docusate sodium (COLACE) capsule 100 mg  100 mg Oral Daily Joel Velasquez MD   100 mg at 21 1620    perflutren lipid microspheres (DEFINITY) injection 1.65 mg  1.5 mL Intravenous ONCE PRN Shagufta Kay MD        HYDROmorphone (DILAUDID) injection 2 mg  2 mg Intravenous Q3H PRN Joel Velasquez MD   1 mg at 21 0338    oxyCODONE-acetaminophen (PERCOCET) 5-325 MG per tablet 1 tablet  1 tablet Oral Q4H PRN Joel Velasquez MD   1 tablet at 21 0038    ketorolac (TORADOL) injection 30 mg  30 mg Intravenous Q6H PRN Joel Velasquez MD   30 mg at 21 0528    atorvastatin (LIPITOR) tablet 40 mg  40 mg Oral Nightly Davis Contreras MD   40 mg at 21    aspirin EC tablet 81 mg  81 mg Oral Daily Davis Conrteras MD   81 mg at 21 1159    sodium chloride flush 0.9 % injection 5-40 mL  5-40 mL Intravenous 2 times per day Davis Contreras MD   10 mL at 21    sodium chloride flush 0.9 % injection 5-40 mL  5-40 mL Intravenous PRN Davis Contreras MD        0.9 % sodium chloride infusion  25 mL Intravenous PRN Wilberto Anglin MD        ondansetron (ZOFRAN-ODT) disintegrating tablet 4 mg  4 mg Oral Q8H PRN Wilberto Anglin MD        Or    ondansetron (ZOFRAN) injection 4 mg  4 mg Intravenous Q6H PRN Wilberto Anglin MD        polyethylene glycol (GLYCOLAX) packet 17 g  17 g Oral Daily PRN Wilberto Anglin MD        acetaminophen (TYLENOL) tablet 650 mg  650 mg Oral Q6H PRN Wilberto Anglin MD   650 mg at 08/16/21 2031    Or    acetaminophen (TYLENOL) suppository 650 mg  650 mg Rectal Q6H PRN Wilberto Anglin MD        0.9 % sodium chloride infusion   Intravenous Continuous Wilberto Anglin MD 75 mL/hr at 08/19/21 0530 New Bag at 08/19/21 0530    benzocaine-menthol (CEPACOL SORE THROAT) lozenge 1 lozenge  1 lozenge Oral Q2H PRN JAMES Allen - CNP   1 lozenge at 08/17/21 8356    penicillin G potassium 4 Million Units in dextrose 5 % 100 mL IVPB  4 Million Units Intravenous Q4H Marlo Aldridge MD   Stopped at 08/19/21 0715    perflutren lipid microspheres (DEFINITY) injection 1.65 mg  1.5 mL Intravenous ONCE PRN Marlo Aldridge MD           Allergies:  No Known Allergies    Problem List:    Patient Active Problem List   Diagnosis Code    Acute medial meniscal tear S83.249A    ACL tear S83.519A    Cerebrovascular accident (CVA) ([facility] [address] I63.9    Leukocytosis D72.829    Head ache R51.9    PFO (patent foramen ovale) Q21.1    Closed displaced fracture of base of fourth metacarpal bone of right hand S62.314A    Closed displaced fracture of body of hamate of right wrist S62.141A    Sebaceous cyst L72.3    Streptococcal bacteremia R78.81, B95.5    Pharyngitis due to Streptococcus species J02.0    S/P patent foramen ovale closure Z87.74       Past Medical History:        Diagnosis Date    CVA (cerebral vascular accident) ([facility] [address] 2019    no residuals EXCEPT PERIPHERAL RT SIDE VISION    Hernia     History of bone graft     PFO (patent foramen ovale) 05/09/2019 PFO closure with 25 mm PFO occluder device    TMJ (dislocation of temporomandibular joint)        Past Surgical History:        Procedure Laterality Date    ANTERIOR CRUCIATE LIGAMENT REPAIR Left 9/10/14    BONE GRAFT      left wrist    CARDIAC SURGERY  2019    PFO  plACED AFTER STROKE    HAND SURGERY      right thumb pins    PRE-MALIGNANT / BENIGN SKIN LESION EXCISION N/A 2020    SEBACEOUS CYST EXCISION, POSTERIOR SCALP performed by Amado Valdovinos MD at [place] [address] History:    Social History     Tobacco Use    Smoking status: Former Smoker     Packs/day: 0.33     Types: Cigarettes     Quit date: 3/11/2019     Years since quittin.4    Smokeless tobacco: Former User     Types: Snuff    Tobacco comment: quit 3/2019   Substance Use Topics    Alcohol use: Yes     Alcohol/week: 6.0 standard drinks     Types: 6 Cans of beer per week     Comment: 6 drinks per week                                Counseling given: Not Answered  Comment: quit 3/2019      Vital Signs (Current):   Vitals:    21 1446 21 2002 21 2243 21 0730   BP: (!) 150/95 (!) 155/96 (!) 146/93 (!) 152/97   Pulse: 82 109 108 105   Resp:    Temp: 97.9 °F (36.6 °C) 98 °F (36.7 °C) 98 °F (36.7 °C) 98 °F (36.7 °C)   TempSrc: Oral Oral Oral Oral   SpO2: 96% 94% 92% 93%   Weight:       Height:                                                  BP Readings from Last 3 Encounters:   21 (!) 152/97   21 116/76   10/23/20 126/85       NPO Status:                                                                                 BMI:   Wt Readings from Last 3 Encounters:   21 178 lb (80.7 kg)   21 170 lb (77.1 kg)   10/23/20 175 lb (79.4 kg)     Body mass index is 24.14 kg/m².     CBC:   Lab Results   Component Value Date    WBC 16.4 2021    RBC 3.85 2021    HGB 11.5 2021    HCT 34.1 2021    MCV 88.7 2021    RDW 14.0 2021     2021 CMP:   Lab Results   Component Value Date     08/19/2021    K 4.2 08/19/2021    K 3.6 08/16/2021     08/19/2021    CO2 26 08/19/2021    BUN 5 08/19/2021    CREATININE 0.8 08/19/2021    GFRAA >60 08/19/2021    GFRAA >60 03/07/2010    AGRATIO 0.8 08/19/2021    LABGLOM >60 08/19/2021    GLUCOSE 133 08/19/2021    PROT 6.4 08/19/2021    PROT 7.0 03/07/2010    CALCIUM 8.2 08/19/2021    BILITOT 0.9 08/19/2021    ALKPHOS 176 08/19/2021    AST 37 08/19/2021    ALT 62 08/19/2021       POC Tests: No results for input(s): POCGLU, POCNA, POCK, POCCL, POCBUN, POCHEMO, POCHCT in the last 72 hours. Coags:   Lab Results   Component Value Date    PROTIME 13.9 08/18/2021    INR 1.22 08/18/2021       HCG (If Applicable): No results found for: PREGTESTUR, PREGSERUM, HCG, HCGQUANT     ABGs: No results found for: PHART, PO2ART, MMW6CVQ, ZDD8DBT, BEART, O8FWONAH     Type & Screen (If Applicable):  No results found for: LABABO, LABRH    Drug/Infectious Status (If Applicable):  No results found for: HIV, HEPCAB    COVID-19 Screening (If Applicable):   Lab Results   Component Value Date    COVID19 Not Detected 08/16/2021    COVID19 Not Detected 08/16/2021    COVID19 NOT DETECTED 01/22/2021           Anesthesia Evaluation  Patient summary reviewed and Nursing notes reviewed  Airway: Mallampati: II  TM distance: >3 FB   Neck ROM: full  Mouth opening: > = 3 FB Dental: normal exam         Pulmonary:Negative Pulmonary ROS and normal exam  breath sounds clear to auscultation                             Cardiovascular:    (+) murmur,         Rhythm: regular  Rate: normal                 ROS comment: PFO     Neuro/Psych:   (+) CVA:, headaches:,             GI/Hepatic/Renal: Neg GI/Hepatic/Renal ROS            Endo/Other: Negative Endo/Other ROS                    Abdominal:             Vascular: negative vascular ROS.          Other Findings:             Anesthesia Plan      MAC     ASA 2       Induction: intravenous. Anesthetic plan and risks discussed with patient. Plan discussed with CRNA.                   Zahra Robb MD   8/19/2021

## 2021-08-19 NOTE — PROGRESS NOTES
Patient requested dilaudid for pain, but does not tolerate the 2 mg ordered. He asked to only receive one. DENTON Sims witnessed the waste of 1 mg of dilaudid by [employer].

## 2021-08-19 NOTE — SEDATION DOCUMENTATION
Pt arrived for image guided CHEST TUBE insertion left . Procedure explained including the risk and benefits of the procedure. All questions answered. Pt verbalizes understanding of the procedure and states no more questions. Consent confirmed. Vital signs stable. Labs, allergies, medications, and code status reviewed. No contraindications noted. Time out completed prior to procedure. Pt was place supine on the CT table. Pt was placed on all cardiac monitoring. Pt placed on 2 L NC for sedation. Vital Signs  Vitals:    08/19/21 1235   BP: (!) 152/109   Pulse: 105   Resp: 24   Temp:    SpO2: 99%    (vital signs in table format)    Pre Ismael Score  2 - Able to move 4 extremities voluntarily on command  2 - BP+/- 20mmHg of normal  2 - Fully awake  2 - Able to maintain oxygen saturation >92% on room air  2 - Able to breathe deeply and cough freely    Allergies  Patient has no known allergies.  (allergies)    Labs  Lab Results   Component Value Date    INR 1.22 (H) 08/18/2021    PROTIME 13.9 (H) 08/18/2021     Lab Results   Component Value Date    CREATININE 0.8 (L) 08/19/2021    BUN 5 (L) 08/19/2021     08/19/2021    GFR >60 03/07/2010    K 4.2 08/19/2021     08/19/2021    CO2 26 08/19/2021     Lab Results   Component Value Date    WBC 16.4 (H) 08/19/2021    HGB 11.5 (L) 08/19/2021    HCT 34.1 (L) 08/19/2021    MCV 88.7 08/19/2021     08/19/2021

## 2021-08-19 NOTE — ANESTHESIA POSTPROCEDURE EVALUATION
Department of Anesthesiology  Postprocedure Note    Patient: Luis Huggins  MRN: [id number]  YOB: 1985  Date of evaluation: 8/19/2021  Time:  2:19 PM     Procedure Summary     Date: 08/19/21 Room / Location: [facility] Cardiac Cath Lab    Anesthesia Start: 0910 Anesthesia Stop: 7715    Procedure: TRANSESOPHAGEAL ECHO Diagnosis:     Scheduled Providers:  Responsible Provider: Kathy Guzman MD    Anesthesia Type: MAC ASA Status: 2          Anesthesia Type: MAC    Ismael Phase I:      Ismael Phase II:      Last vitals: Reviewed and per EMR flowsheets.        Anesthesia Post Evaluation    Patient location during evaluation: PACU  Patient participation: complete - patient participated  Level of consciousness: awake and alert  Pain score: 0  Airway patency: patent  Nausea & Vomiting: no nausea and no vomiting  Complications: no  Cardiovascular status: blood pressure returned to baseline  Respiratory status: acceptable  Hydration status: stable

## 2021-08-19 NOTE — PROGRESS NOTES
INPATIENT PULMONARY CRITICAL CARE PROGRESS NOTE      Reason for visit: Group B streptococcal bacteremia, strep throat, left loculated effusion/empyema, findings suggestive of septic emboli prior PFO closure. SUBJECTIVE: The patient juan afebrile and hemodynamically stable. This morning was on oxygen at 2 LPM.  He underwent left chest tube placement by IR. About 100 mL drained, the chest tube was fixed to Pleur-evac, presently not on suction. He is in considerable pain in the intrascapular region. He is attempting to eat. He also underwent GARDENIA after completing TTE yesterday. Significant other and father at bedside. Physical Exam:  Blood pressure (!) 165/109, pulse 115, temperature 98 °F (36.7 °C), temperature source Oral, resp. rate 19, height 6' (1.829 m), weight 178 lb (80.7 kg), SpO2 97 %.'   Constitutional: Pleasant, averagely built, not ill-appearing. In mild pain due to chest tube  HENT: Oropharynx is clear and moist.  Class II airway, no dental lesions. Eyes:  Conjunctivae are normal.  No scleral icterus. Neck: No JVD. Cardiovascular: Normal rate, regular rhythm, normal heart sounds. Pulmonary/Chest: No wheezes. No rales. Air entry was reduced due to hypoventilation from pain   Abdominal: Deferred. Musculoskeletal: No cyanosis. No clubbing. No obvious joint deformity. Lymphadenopathy: No cervical or supraclavicular adenopathy. Skin: Skin is warm and dry. No rash or nodules on the exposed extremities. Psychiatric: Normal mood and affect. Behavior is normal.  No anxiety. Neurologic: Alert, awake and oriented. PERRL. Speech fluent.   No obvious neurologic deficit, detailed exam not performed      Results:  CBC:   Recent Labs     08/18/21  0653 08/19/21  0540   WBC 12.0* 16.4*   HGB 12.1* 11.5*   HCT 35.1* 34.1*   MCV 88.4 88.7    314     BMP:   Recent Labs     08/18/21  0653 08/19/21  0540    137   K 3.9 4.2    102   CO2 28 26   PHOS 4.7  --    BUN 6* 5* Sonographer         Esvin Hernandez RDCS   Ordering Physician Neelima Joseph MD       Physician           Abhinav Briceño MD, [facility]  Procedure Type of Study   TTE procedure:ECHOCARDIOGRAM COMPLETE 2D W DOPPLER W COLOR. Procedure Date Date: 08/18/2021 Start: 08:19 AM Study Location: [address] [place] [address] - Echo Lab Technical Quality: Adequate visualization Additional Indications:Bacteremia. Patient Status: Routine Height: 72 inches Weight: 178 pounds BSA: 2.03 m2 BMI: 24.14 kg/m2 BP: 138/90 mmHg  Conclusions   Summary  Normal LV systolic function with a visually estimated ejection fraction of  55-60%. No regional wall motion abnormalities are seen. EF calculated by 3D at 59%. A 25 mm Amplatzer closure device is visualized and appears well seated. No  obvious shunt from color flow. Normal left ventricular diastolic filling pressure. Mild eccentric tricuspid regurgitation. Systolic pulmonary artery pressure (SPAP) is elevated and estimated at 60  mmHg (RAP 15 mmHg) c/w mod-severe pulmonary hypertension. No obvious masses, thrombi, or vegetations are noted. Signature   ------------------------------------------------------------------  Electronically signed by Abhinav Briceño MD, [facility]  (Interpreting physician) on 08/18/2021 at 10:17 AM  ------------------------------------------------------------------   Findings   Left Ventricle  Normal left ventricle size, wall thickness, and systolic function with a  visually estimated ejection fraction of 55-60%. EF calculated by 3D at 59%. No regional wall motion abnormalities are seen. Normal left ventricular diastolic filling pressure. Mitral Valve  The mitral valve is mildly calcified with no restriction of motion. Trace mitral regurgitation. Left Atrium  The left atrium is at the upper limits of normal in size.    Aortic Valve The aortic valve appears normal in structure and function. There is no evidence of aortic regurgitation. Aorta  The aortic root is normal in size. Right Ventricle  The right ventricle is normal in size and function. TAPSE is measured at 27 mm. S velocity is measured at 17.5 cm/s. Tricuspid Valve  The tricuspid valve is normal in structure. Mild eccentric tricuspid regurgitation. Systolic pulmonary artery pressure (SPAP) is elevated and estimated at 60  mmHg (right atrial pressure 15 mmHg) consistent with moderate to severe  pulmonary hypertension. Right Atrium  The right atrium is normal in size. Right atrial area is 19 cm2. Right atrial volume is 22.7 ml/m2. Pulmonic Valve  The pulmonic valve is normal in structure and function. No evidence of pulmonic regurgitation. Pericardial Effusion  No pericardial effusion noted. Pleural Effusion  No pleural effusion noted. Miscellaneous  A 25 mm Amplatzer closure device is visualized and appears well seated. No  obvious shunt from color flow. The IVC is dilated in size (>2.1 cm) and collapses <50% with respiration  consistent with markedly elevated right atrial pressures (15 mmHg) . No obvious masses, thrombi, or vegetations are noted.   M-Mode/2D Measurements (cm)   LV Diastolic Dimension: 5.86 cm LV Systolic Dimension: 0.78 cm  LV Septum Diastolic: 9.70 cm  LV PW Diastolic: 0.9 cm         AO Root Dimension: 3.5 cm                                  LA Dimension: 3.7 cm                                  LA Area: 22 cm2  LVOT: 2 cm                      LA volume/Index: 68.85 ml /34 ml/m2  Doppler Measurements   AV Peak Velocity: 136 cm/s     MV Peak E-Wave: 87.4 cm/s  AV Peak Gradient: 7.4 mmHg     MV Peak A-Wave: 70.3 cm/s  LVOT Peak Velocity: 124 cm/s   MV E/A Ratio: 1.24   TR Velocity:337 cm/s  TR Gradient:45.43 mmHg  Estimated RAP:15 mmHg  Estimated RVSP: 60 mmHg  E' Septal Velocity: 11.1 cm/s  E' Lateral Velocity: 16.6 cm/s  E/E' ratio: 6.6 Aortic Valve   Peak Velocity: 136 cm/s  Peak Gradient: 7.4 mmHg  Aorta   Aortic Root: 3.5 cm  LVOT Diameter: 2 cm      XR CHEST (2 VW)    Result Date: 8/17/2021  EXAMINATION: TWO XRAY VIEWS OF THE CHEST 8/17/2021 2:58 pm COMPARISON: 08/14/2020 HISTORY: ORDERING SYSTEM PROVIDED HISTORY: pleuritic pain TECHNOLOGIST PROVIDED HISTORY: Reason for exam:->pleuritic pain Reason for Exam: chest pain Acuity: Acute Type of Exam: Subsequent/Follow-up FINDINGS: Lung volumes are low accentuating heart size and bronchovascular markings at the lung bases. Small left-sided pleural effusion is seen. There is adjacent left basilar consolidation There is hazy right basilar opacity Amplatzer device is seen. Increased bibasilar airspace disease with small left-sided pleural effusion     XR CHEST (2 VW)    Result Date: 8/14/2021  EXAMINATION: TWO XRAY VIEWS OF THE CHEST 8/14/2021 5:46 pm COMPARISON: Chest radiograph performed 10/23/2020. HISTORY: ORDERING SYSTEM PROVIDED HISTORY: SOB TECHNOLOGIST PROVIDED HISTORY: Reason for exam:->SOB Reason for Exam: cp, sob, tachycardia started 2-3 days ago Acuity: Acute Type of Exam: Initial FINDINGS: There is no acute consolidation or effusion. There is no pneumothorax. The mediastinal structures are unremarkable. The upper abdomen is unremarkable. The extrathoracic soft tissues are unremarkable. There is no acute osseous abnormality. No acute cardiopulmonary process. XR LUMBAR SPINE (2-3 VIEWS)    Result Date: 8/14/2021  EXAMINATION: THREE XRAY VIEWS OF THE LUMBAR SPINE 8/14/2021 6:18 pm COMPARISON: None. HISTORY: ORDERING SYSTEM PROVIDED HISTORY: low back pain TECHNOLOGIST PROVIDED HISTORY: Reason for exam:->low back pain Reason for Exam: low back for a few days; no injury Acuity: Acute Type of Exam: Initial FINDINGS: The vertebral body heights are maintained. The disc spaces are maintained. There is no spondylolisthesis. The visualized bony pelvis is intact.   The surrounding soft tissues are unremarkable. No acute vertebral body or disc space abnormality. CT SOFT TISSUE NECK WO CONTRAST    Result Date: 8/16/2021  EXAMINATION: CT OF THE NECK WITHOUT CONTRAST  8/16/2021 TECHNIQUE: CT of the neck was performed without the administration of intravenous contrast. Multiplanar reformatted images are provided for review. Dose modulation, iterative reconstruction, and/or weight based adjustment of the mA/kV was utilized to reduce the radiation dose to as low as reasonably achievable. COMPARISON: None. HISTORY: ORDERING SYSTEM PROVIDED HISTORY: evaluate for suppurative complication of pharyngitis TECHNOLOGIST PROVIDED HISTORY: Reason for exam:->evaluate for suppurative complication of pharyngitis Reason for Exam: sore throat, swollen rt node, difficulty swallowing x 6 days Acuity: Acute Type of Exam: Initial FINDINGS: PHARYNX/LARYNX:  The palatine tonsils are normal in appearance. The tongue is normal in appearance. The valleculae, epiglottis, aryepiglottic folds and pyriform sinuses appear unremarkable. The true and false vocal cords are normal in appearance. No mass or abscess is seen. SALIVARY GLANDS/THYROID:  The parotid and submandibular glands appear unremarkable. The thyroid gland appears unremarkable. LYMPH NODES:  No cervical or supraclavicular lymphadenopathy is seen. SOFT TISSUES:  No appreciable soft tissue swelling or mass is seen. BRAIN/ORBITS/SINUSES:  The visualized portion of the intracranial contents appear unremarkable. The visualized portion of the orbits, paranasal sinuses and mastoid air cells demonstrate no acute abnormality. LUNG APICES/SUPERIOR MEDIASTINUM:  No focal consolidation is seen within the visualized lung apices. No superior mediastinal lymphadenopathy or mass. The visualized portion of the trachea appears unremarkable. BONES:  No aggressive appearing lytic or blastic bony lesion. No acute abnormality of the soft tissue structures of the neck. CT LUMBAR SPINE W CONTRAST    Result Date: 8/14/2021  EXAMINATION: CT OF THE LUMBAR SPINE WITH CONTRAST  8/14/2021 9:12 pm TECHNIQUE: CT of the lumbar spine was performed with the administration of intravenous contrast. Multiplanar reformatted images are provided for review. Dose modulation, iterative reconstruction, and/or weight based adjustment of the mA/kV was utilized to reduce the radiation dose to as low as reasonably achievable. COMPARISON: None HISTORY: ORDERING SYSTEM PROVIDED HISTORY: low back pain, fever TECHNOLOGIST PROVIDED HISTORY: Reason for exam:->low back pain, fever Decision Support Exception - unselect if not a suspected or confirmed emergency medical condition->Emergency Medical Condition (MA) Reason for Exam: severe b ack pain x few days. more on the left side. fever, sore throath Acuity: Acute Type of Exam: Initial FINDINGS: BONES/ALIGNMENT:  There is normal alignment of the spine. The vertebral body heights are maintained. No osseous destructive lesion is seen. SOFT TISSUES: No acute paraspinal soft tissue abnormality. No abnormal fluid collection or enhancement. Incidental 8 mm left renal cortical cyst appears benign. L1-L2: There is no significant disc protrusion, central spinal canal stenosis or neural foraminal narrowing. L2-L3: There is no significant disc protrusion, central spinal canal stenosis or neural foraminal narrowing. L3-L4: There is no significant disc protrusion, central spinal canal stenosis or neural foraminal narrowing. L4-L5: There is no significant disc protrusion, central spinal canal stenosis or neural foraminal narrowing. L5-S1: There is no significant disc protrusion, central spinal canal stenosis or neural foraminal narrowing. Unremarkable contrast enhanced CT of the lumbosacral spine.      CT CHEST PULMONARY EMBOLISM W CONTRAST    Result Date: 8/15/2021  EXAMINATION: CTA OF THE CHEST 8/15/2021 9:56 pm TECHNIQUE: CTA of the chest was performed after the administration of intravenous contrast.  Multiplanar reformatted images are provided for review. MIP images are provided for review. Dose modulation, iterative reconstruction, and/or weight based adjustment of the mA/kV was utilized to reduce the radiation dose to as low as reasonably achievable. COMPARISON: None. HISTORY: ORDERING SYSTEM PROVIDED HISTORY: pleuritic chest pain , left TECHNOLOGIST PROVIDED HISTORY: Reason for exam:->pleuritic chest pain , left Decision Support Exception - unselect if not a suspected or confirmed emergency medical condition->Emergency Medical Condition (MA) Reason for Exam: left sided back and chest pain Acuity: Acute Type of Exam: Initial FINDINGS: Pulmonary Arteries: Pulmonary arteries are within normal limits in size. . Suboptimal opacification of the pulmonary arteries. No filling defect is identified in the pulmonary arteries to the level of thelobar arteries. Mediastinum: Heart is borderline enlarged. No pericardial effusion. Motion degradation precludes evaluation of the aortic root and ascending aorta. The descending thoracic aorta is within normal limits in size. No luminal defect is appreciated in the descending thoracic aorta. Inter atrial closure device noted. Lungs/pleura: Central airways are patent. Ground-glass the confluent airspace disease noted. Scattered nodular airspace disease noted. No pleural effusion. No pneumothorax. Soft Tissues/Bones: No adenopathy. Calcified lymph nodes in left hilum. Upper Abdomen: Limited images of the upper abdomen are unremarkable. 1. Multifocal airspace disease, including scattered nodular airspace disease. Correlate with presentation. Follow-up to resolution recommended. 2. Suboptimal opacification of the pulmonary arteries. No acute pulmonary embolism to the lobar arteries given limitation. 3. Other findings as described.      IR PICC WO SQ PORT/PUMP > 5 YEARS    Result Date: 8/18/2021  EXAMINATION: LIMITED ULTRASOUND OF THE ARM FOR PICC ACCESS, 8/18/2021 TECHNIQUE: The PICC team used ultrasound guidance to place a PICC line. COMPARISON: 08/17/2021. HISTORY: ORDERING SYSTEM PROVIDED HISTORY: iv abx TECHNOLOGIST PROVIDED HISTORY: Reason for exam:->iv abx How many lumens are being requested?->2 What site is the preferred site? ->No preference What side should this line be placed? ->Either FLUOROSCOPY DOSE AND TYPE OR TIME AND EXPOSURES: None. FINDINGS: Ultrasound images demonstrate patency of the left basilic vein which was used for access for placement of a PICC by the PICC team, without a radiologist present. Left basilic vein measures 2.61 cm in transverse diameter in the region of the access site. AP view of the chest was obtained following placement of the left upper extremity PICC line which demonstrates distal tip of the PICC line in the region of junction of superior vena cava and right atrium. There is faint visualization of device overlying the rightward aspect of the cardiopericardial silhouette related to PFO closure device. There is increasing left basilar pleuroparenchymal disease representing combination of pleural fluid and parenchymal disease/atelectasis. There is persistent right basilar parenchymal disease/atelectasis which is generally similar in appearance when compared to the study of 08/17/2021. Faint fibronodular densities again identified in the lateral aspect of the right mid lung zone. By report, a 46 cm 5.5 Tajik dual lumen PICC line was placed. Successful placement of PICC line. CT CHEST ABDOMEN PELVIS W CONTRAST    Result Date: 8/18/2021  EXAMINATION: CT OF THE CHEST, ABDOMEN, AND PELVIS WITH CONTRAST 8/18/2021 11:04 am TECHNIQUE: CT of the chest, abdomen and pelvis was performed with the administration of intravenous contrast. Multiplanar reformatted images are provided for review.  Dose modulation, iterative reconstruction, and/or weight based adjustment of the mA/kV was utilized to reduce the radiation dose to as low as reasonably achievable. COMPARISON: 08/15/2021 HISTORY: ORDERING SYSTEM PROVIDED HISTORY: elevated LFTs, pna w effusion TECHNOLOGIST PROVIDED HISTORY: Reason for exam:->elevated LFTs, pna w effusion Additional Contrast?->Oral FINDINGS: Chest: Mediastinum: The central airways are patent. There is no hilar or mediastinal adenopathy. Lungs/pleura: Dense consolidation has significantly progressed throughout the left lower lobe with central air bronchograms. A small to moderate-sized multiloculated left pleural effusion is now noted. There is patchy consolidation within the right middle lobe and right posterior costophrenic angle. Several small noncalcified right-sided pleural based soft tissue nodules are have doubled in size over the interval with the largest now measuring 11 mm. Soft Tissues/Bones: There is no acute fracture or aggressive osseous lesion. Abdomen/Pelvis: Organs: The liver, pancreas, spleen, kidneys and adrenals are unremarkable. GI/Bowel: There is no bowel dilatation, wall thickening or obstruction. Pelvis: The bladder and prostate are unremarkable. Peritoneum/Retroperitoneum: There is no free air, free fluid or intraperitoneal inflammatory change. There is no adenopathy. Bones/Soft Tissues: There is no acute fracture or aggressive osseous lesion. 1. Significant worsening of left lower lobe pneumonia with developing loculated parapneumonic effusion. 2. Several rapidly enlarging right-sided subpleural pulmonary nodules. Septic emboli are a diagnostic consideration. 3. No acute abdominopelvic abnormality.      CT GUIDED PLEURAL DRAINAGE W CATH PERC    Result Date: 8/19/2021  PROCEDURE: CT PERC CATH PLEURAL DRAIN W IMAGIN MODERATE CONSCIOUS SEDATION 8/19/2021 HISTORY: ORDERING SYSTEM PROVIDED HISTORY: Loculated left effusion, picture of septic emboli TECHNOLOGIST PROVIDED HISTORY: Reason for exam:->Loculated left effusion, picture of septic emboli Which side should the procedure be performed? ->Left Reason for Exam: left lung drainage and tube placement Relevant Medical/Surgical History: no fluoro used TECHNIQUE: Dose modulation, iterative reconstruction, and/or weight based adjustment of the mA/kV was utilized to reduce the radiation dose to as low as reasonably achievable. CONTRAST: None. SEDATION: Moderate conscious sedation with versed and fentanyl, was intravenously administered by a nurse under controlled circumstances. Duration of sedation was approximately 15 minutes. ESTIMATED BLOOD LOSS: Minimal. FLUOROSCOPY DOSE AND TYPE OR TIME AND EXPOSURES: None. DESCRIPTION OF PROCEDURE: Informed consent was obtained after a detailed explanation of the procedure including risks, benefits, and alternatives. Universal protocol was observed. Initial CT evaluation demonstrated a moderate to large left-sided pleural effusion. An appropriate skin entry site was chosen. The skin was sterilely prepped and draped. Local anesthesia achieved with 1% lidocaine. A 10 [place]bourgish locking pigtail drainage catheter was advanced into the inferior portion of the pleural effusion using Trocar technique. The pigtail was formed and locked. There was rapid return of cloudy yellow fluid. The catheter was secured to the skin and placed to Pleur-vac drainage. Samples were sent for culture and sensitivity. Successful CT-guided chest tube placement. Assessment and plan:  Left empyema. CT-guided chest tube placed by IR, small amount of fluid. Lab studies pending. Will place fibrinolytic tomorrow. Informed the patient and his family that the chest tube may need to stay in for a few days. ?  Septic emboli. Classic appearance on CT, source unclear. Possibly associated with bacteremia/infective endocarditis. TTE and GARDENIA there was a small globular echodensity suggestive of vegetation on the left atrial side of the occluder. There was no shunt except for delayed intrapulmonary shunt.   Per cardiology and ID  Group B streptococcus bacteremia. On high-dose penicillin G, Dr. Ceci Joness following. Leukocytosis. Mild. Anemia. Normochromic, normocytic. Mild. ?  Microscopic hematuria. The patient denies any symptoms. Can be seen with endocarditis   CVA, ASD closure. Had no shunt on echocardiogram in May 2021. Abnormal LFT. Unclear etiology  DVT prophylaxis. Hold Lovenox until chest tube completed    Discussed with patient, his family and nursing.       Electronically signed by:  Wayne Banda MD    8/19/2021    3:00 PM.

## 2021-08-19 NOTE — PROCEDURES
H&P Update    I have reviewed the history and physical and examined the patient and find no relevant changes. I have reviewed with the patient and/or family the risks, benefits, and alternatives to the procedure. Pre-sedation Assessment    Patient:  Arpan Krishna   :   1985  Intended Procedure: GARDENIA due to group B streptococcus bacteremia with hx Amplatz closure device for PFO and hx CVA in 2019      [facility] nurses notes reviewed and agreed.   Medications reviewed  Allergies: No Known Allergies      Pre-Procedure Assessment/Plan: per anesthesia    Level of Sedation Plan: Per anesthesia    Post Procedure plan: Return to same level of care    Prelim GARDENIA findings:  -mild MR and TR  -LA appendage without evidence of thrombus  -no evidence for valvular vegetations  -small echodensity attached to Amplatz closure device with some mobility; need to d/w cardiologist who performed closure device  -bubble study negative  -Eustachian valve and PICC line visualized in RA    No complications    Formal report to follow

## 2021-08-19 NOTE — PROGRESS NOTES
Infectious Disease Follow up Notes    CC :  Streptococcal bacteremia      Antibiotics:   pcnG 4mu q4    Admit Date:   8/15/2021  Hospital Day: 5    Subjective:   He remains AF. On RA. About the same today. Continued pleuritic chest pain. Objective:     Patient Vitals for the past 8 hrs:   BP Temp Temp src Pulse Resp SpO2   08/19/21 1015 (!) 146/93 97.6 °F (36.4 °C) Oral 94 14 94 %   08/19/21 0730 (!) 152/97 98 °F (36.7 °C) Oral 105 16 93 %       EXAM:  General:  A&O, NAD     HEENT:  NCAT, PERRL, sclera anicteric   NECK:   Supple, symmetric   LUNGS:   Non-labored breathing.     EXT:  No focal rash, no LE edema          LINE: PICC LUE in place, site ok          Scheduled Meds:   docusate sodium  100 mg Oral Daily    atorvastatin  40 mg Oral Nightly    aspirin EC  81 mg Oral Daily    sodium chloride flush  5-40 mL Intravenous 2 times per day    penicillin G  4 Million Units Intravenous Q4H       Continuous Infusions:   sodium chloride      sodium chloride 75 mL/hr at 08/19/21 0530          Data Review:    Lab Results   Component Value Date    WBC 16.4 (H) 08/19/2021    HGB 11.5 (L) 08/19/2021    HCT 34.1 (L) 08/19/2021    MCV 88.7 08/19/2021     08/19/2021     Lab Results   Component Value Date    CREATININE 0.8 (L) 08/19/2021    BUN 5 (L) 08/19/2021     08/19/2021    K 4.2 08/19/2021     08/19/2021    CO2 26 08/19/2021       Hepatic Function Panel:   Lab Results   Component Value Date    ALKPHOS 176 08/19/2021    ALT 62 08/19/2021    AST 37 08/19/2021    PROT 6.4 08/19/2021    PROT 7.0 03/07/2010    BILITOT 0.9 08/19/2021    BILIDIR 0.5 08/17/2021    IBILI 0.3 08/17/2021    LABALBU 2.8 08/19/2021         MICRO:  8/14     BC #1  S pyogenes              BC #2 neg              Throat GABHS screen +               COVID NAAT neg   8/16     COVID NAAT neg                     COVID PCR neg   8/17 BC x2 NGTD IMAGING:  CT chest 8/15/21  Impression   1. Multifocal airspace disease, including scattered nodular airspace disease. Correlate with presentation.  Follow-up to resolution recommended. 2. Suboptimal opacification of the pulmonary arteries.  No acute pulmonary   embolism to the lobar arteries given limitation. 3. Other findings as described.      CT neck 8/16/21 negative     CT L spine negative    CT CAP 8/18/21  Impression   1. Significant worsening of left lower lobe pneumonia with developing   loculated parapneumonic effusion. 2. Several rapidly enlarging right-sided subpleural pulmonary nodules. Septic emboli are a diagnostic consideration. 3. No acute abdominopelvic abnormality. Assessment:     Patient Active Problem List    Diagnosis Date Noted    Pharyngitis due to Streptococcus species 08/16/2021    S/P patent foramen ovale closure 08/16/2021    Streptococcal bacteremia 08/15/2021    Sebaceous cyst     Closed displaced fracture of base of fourth metacarpal bone of right hand 06/27/2019    Closed displaced fracture of body of hamate of right wrist 06/27/2019    PFO (patent foramen ovale)     Cerebrovascular accident (CVA) ([address] 03/11/2019    Leukocytosis 03/11/2019    Head ache 03/11/2019    Acute medial meniscal tear 07/29/2014    ACL tear 07/29/2014       S pyogenes bacteremia, +BC on 8/14/21  Acute throat/neck pain with +throat GABHS test  Still unclear if this is disseminated disease from strep pharyngitis or a primary endovascular infection     Diffuse nodular infiltrate suggestive of septic pulmonary emboli   Evolving, now loculated, pleural effusion, possibly empyema     Elevated LFTs  Liver appears normal on CT      History of PFO s/p repair    GARDENIA discussed with Cardiology today. ? Lesion on the closure device.   No valvular vegetation.       NKDA     Plan:   Continue IV pcn  Note rising WBC which I think more likely reflective of infection in pleural space;

## 2021-08-19 NOTE — PROGRESS NOTES
Hospitalist Progress Note      PCP: Mahsa Cedillo MD    Date of Admission: 8/15/2021    Chief Complaint: positive blood cx    Subjective: no new c/o. Improved pain w/ current regimen. Medications:  Reviewed    Infusion Medications    sodium chloride      sodium chloride 75 mL/hr at 08/19/21 0530     Scheduled Medications    docusate sodium  100 mg Oral Daily    atorvastatin  40 mg Oral Nightly    aspirin EC  81 mg Oral Daily    sodium chloride flush  5-40 mL Intravenous 2 times per day    penicillin G  4 Million Units Intravenous Q4H     PRN Meds: oxyCODONE-acetaminophen, HYDROmorphone, perflutren lipid microspheres, oxyCODONE-acetaminophen, ketorolac, sodium chloride flush, sodium chloride, ondansetron **OR** ondansetron, polyethylene glycol, acetaminophen **OR** acetaminophen, benzocaine-menthol, perflutren lipid microspheres      Intake/Output Summary (Last 24 hours) at 8/19/2021 0909  Last data filed at 8/19/2021 0341  Gross per 24 hour   Intake    Output 750 ml   Net -750 ml       Physical Exam Performed:    BP (!) 152/97   Pulse 105   Temp 98 °F (36.7 °C) (Oral)   Resp 16   Ht 6' (1.829 m)   Wt 178 lb (80.7 kg)   SpO2 93%   BMI 24.14 kg/m²     General appearance: No apparent distress, appears stated age and cooperative. HEENT: Pupils equal, round, and reactive to light. Conjunctivae/corneas clear. Neck: Supple, with full range of motion. No jugular venous distention. Trachea midline. Respiratory:  Normal respiratory effort. Clear to auscultation, bilaterally without Rales/Wheezes/Rhonchi. Cardiovascular: Regular rate and rhythm with normal S1/S2 without murmurs, rubs or gallops. Abdomen: Soft, non-tender, non-distended with normal bowel sounds. Musculoskeletal: No clubbing, cyanosis or edema bilaterally. Full range of motion without deformity. Skin: Skin color, texture, turgor normal.  No rashes or lesions.   Neurologic:  Neurovascularly intact without any focal Leukocytosis/Tachycardia/Fever POArrival 2nd to above infection. Continue IVF as appropriate and monitor clinical response w/ ABX as written. COVID 19 - initially suspected, ruled out w/ both NEGATIVE NAAT/PCR. Hx CVA - s/p PFO closure controlled on ASA/Stating for 2nd risk reduction. Transaminitis - of unclear etiology but stable. Further w/up pending CT results to start. Will continue to follow serial labs. Reviewed and documented as above. DVT Prophylaxis: [facility]    Recent Labs     08/18/21  0653 08/19/21  0540    314     Diet: Diet NPO  Code Status: Full Code      PT/OT Eval Status: Not yet ordered. Dispo - Likely here over the weekend w/ d/c Monday 23 August at the earliest pending clinical course and subspecialty recs.  Discussed w/ elisha Vincent and Father present at bedside 18 August. Father, Joseph Berman, present 19 August.     Alissa Castillo MD

## 2021-08-20 ENCOUNTER — APPOINTMENT (OUTPATIENT)
Dept: GENERAL RADIOLOGY | Age: 36
DRG: 853 | End: 2021-08-20
Payer: COMMERCIAL

## 2021-08-20 LAB
A/G RATIO: 0.7 (ref 1.1–2.2)
ALBUMIN SERPL-MCNC: 3 G/DL (ref 3.4–5)
ALP BLD-CCNC: 222 U/L (ref 40–129)
ALT SERPL-CCNC: 55 U/L (ref 10–40)
ANION GAP SERPL CALCULATED.3IONS-SCNC: 12 MMOL/L (ref 3–16)
APPEARANCE FLUID: CLEAR
AST SERPL-CCNC: 30 U/L (ref 15–37)
BILIRUB SERPL-MCNC: 0.9 MG/DL (ref 0–1)
BUN BLDV-MCNC: 8 MG/DL (ref 7–20)
CALCIUM SERPL-MCNC: 9 MG/DL (ref 8.3–10.6)
CELL COUNT FLUID TYPE: NORMAL
CHLORIDE BLD-SCNC: 97 MMOL/L (ref 99–110)
CLOT EVALUATION: NORMAL
CO2: 25 MMOL/L (ref 21–32)
COLOR FLUID: YELLOW
CREAT SERPL-MCNC: 0.9 MG/DL (ref 0.9–1.3)
FLUID TYPE: NORMAL
GFR AFRICAN AMERICAN: >60
GFR NON-AFRICAN AMERICAN: >60
GLOBULIN: 4.1 G/DL
GLUCOSE BLD-MCNC: 121 MG/DL (ref 70–99)
GLUCOSE, FLUID: 37 MG/DL
HCT VFR BLD CALC: 35.6 % (ref 40.5–52.5)
HEMOGLOBIN: 12.3 G/DL (ref 13.5–17.5)
LD, FLUID: 878 U/L
LYMPHOCYTES, BODY FLUID: 14 %
MACROPHAGE FLUID: 4 %
MCH RBC QN AUTO: 30.2 PG (ref 26–34)
MCHC RBC AUTO-ENTMCNC: 34.7 G/DL (ref 31–36)
MCV RBC AUTO: 87.2 FL (ref 80–100)
NEUTROPHIL, FLUID: 82 %
NUCLEATED CELLS FLUID: 1033 /CUMM
NUMBER OF CELLS COUNTED FLUID: 100
PDW BLD-RTO: 14.3 % (ref 12.4–15.4)
PHOSPHORUS: 3.8 MG/DL (ref 2.5–4.9)
PLATELET # BLD: 435 K/UL (ref 135–450)
PMV BLD AUTO: 7.2 FL (ref 5–10.5)
POTASSIUM SERPL-SCNC: 4.1 MMOL/L (ref 3.5–5.1)
PROTEIN FLUID: 4.5 G/DL
RBC # BLD: 4.09 M/UL (ref 4.2–5.9)
RBC FLUID: 3000 /CUMM
SODIUM BLD-SCNC: 134 MMOL/L (ref 136–145)
TOTAL PROTEIN: 7.1 G/DL (ref 6.4–8.2)
WBC # BLD: 18.2 K/UL (ref 4–11)

## 2021-08-20 PROCEDURE — 2580000003 HC RX 258: Performed by: INTERNAL MEDICINE

## 2021-08-20 PROCEDURE — 6360000002 HC RX W HCPCS: Performed by: INTERNAL MEDICINE

## 2021-08-20 PROCEDURE — 6370000000 HC RX 637 (ALT 250 FOR IP): Performed by: INTERNAL MEDICINE

## 2021-08-20 PROCEDURE — 94761 N-INVAS EAR/PLS OXIMETRY MLT: CPT

## 2021-08-20 PROCEDURE — 84100 ASSAY OF PHOSPHORUS: CPT

## 2021-08-20 PROCEDURE — 6370000000 HC RX 637 (ALT 250 FOR IP): Performed by: NURSE PRACTITIONER

## 2021-08-20 PROCEDURE — 2700000000 HC OXYGEN THERAPY PER DAY

## 2021-08-20 PROCEDURE — 80053 COMPREHEN METABOLIC PANEL: CPT

## 2021-08-20 PROCEDURE — 99232 SBSQ HOSP IP/OBS MODERATE 35: CPT | Performed by: INTERNAL MEDICINE

## 2021-08-20 PROCEDURE — 85027 COMPLETE CBC AUTOMATED: CPT

## 2021-08-20 PROCEDURE — 32561 LYSE CHEST FIBRIN INIT DAY: CPT | Performed by: INTERNAL MEDICINE

## 2021-08-20 PROCEDURE — 2060000000 HC ICU INTERMEDIATE R&B

## 2021-08-20 PROCEDURE — 32551 INSERTION OF CHEST TUBE: CPT

## 2021-08-20 PROCEDURE — 99255 IP/OBS CONSLTJ NEW/EST HI 80: CPT | Performed by: THORACIC SURGERY (CARDIOTHORACIC VASCULAR SURGERY)

## 2021-08-20 PROCEDURE — 71045 X-RAY EXAM CHEST 1 VIEW: CPT

## 2021-08-20 RX ORDER — HYDROMORPHONE HCL 110MG/55ML
1 PATIENT CONTROLLED ANALGESIA SYRINGE INTRAVENOUS
Status: DISCONTINUED | OUTPATIENT
Start: 2021-08-20 | End: 2021-08-24

## 2021-08-20 RX ADMIN — OXYCODONE HYDROCHLORIDE AND ACETAMINOPHEN 2 TABLET: 5; 325 TABLET ORAL at 07:27

## 2021-08-20 RX ADMIN — HYDROMORPHONE HYDROCHLORIDE 1 MG: 1 INJECTION, SOLUTION INTRAMUSCULAR; INTRAVENOUS; SUBCUTANEOUS at 03:22

## 2021-08-20 RX ADMIN — KETOROLAC TROMETHAMINE 30 MG: 30 INJECTION, SOLUTION INTRAMUSCULAR; INTRAVENOUS at 18:45

## 2021-08-20 RX ADMIN — METOPROLOL TARTRATE 25 MG: 25 TABLET, FILM COATED ORAL at 09:30

## 2021-08-20 RX ADMIN — Medication 10 ML: at 21:50

## 2021-08-20 RX ADMIN — DEXTROSE MONOHYDRATE 4 MILLION UNITS: 5 INJECTION INTRAVENOUS at 12:28

## 2021-08-20 RX ADMIN — KETOROLAC TROMETHAMINE 30 MG: 30 INJECTION, SOLUTION INTRAMUSCULAR; INTRAVENOUS at 12:28

## 2021-08-20 RX ADMIN — HYDROMORPHONE HYDROCHLORIDE 1 MG: 1 INJECTION, SOLUTION INTRAMUSCULAR; INTRAVENOUS; SUBCUTANEOUS at 10:38

## 2021-08-20 RX ADMIN — METOPROLOL TARTRATE 25 MG: 25 TABLET, FILM COATED ORAL at 21:40

## 2021-08-20 RX ADMIN — DEXTROSE MONOHYDRATE 4 MILLION UNITS: 5 INJECTION INTRAVENOUS at 00:02

## 2021-08-20 RX ADMIN — HYDROMORPHONE HYDROCHLORIDE 1 MG: 1 INJECTION, SOLUTION INTRAMUSCULAR; INTRAVENOUS; SUBCUTANEOUS at 15:40

## 2021-08-20 RX ADMIN — OXYCODONE HYDROCHLORIDE AND ACETAMINOPHEN 2 TABLET: 5; 325 TABLET ORAL at 14:16

## 2021-08-20 RX ADMIN — KETOROLAC TROMETHAMINE 30 MG: 30 INJECTION, SOLUTION INTRAMUSCULAR; INTRAVENOUS at 03:46

## 2021-08-20 RX ADMIN — DORNASE ALFA 5 MG: 1 SOLUTION RESPIRATORY (INHALATION) at 11:15

## 2021-08-20 RX ADMIN — ALTEPLASE 10 MG: 2.2 INJECTION, POWDER, LYOPHILIZED, FOR SOLUTION INTRAVENOUS at 12:10

## 2021-08-20 RX ADMIN — DEXTROSE MONOHYDRATE 4 MILLION UNITS: 5 INJECTION INTRAVENOUS at 03:44

## 2021-08-20 RX ADMIN — ACETAMINOPHEN 650 MG: 325 TABLET ORAL at 17:21

## 2021-08-20 RX ADMIN — HYDROMORPHONE HYDROCHLORIDE 1 MG: 1 INJECTION, SOLUTION INTRAMUSCULAR; INTRAVENOUS; SUBCUTANEOUS at 17:36

## 2021-08-20 RX ADMIN — ATORVASTATIN CALCIUM 40 MG: 10 TABLET, FILM COATED ORAL at 21:40

## 2021-08-20 RX ADMIN — DEXTROSE MONOHYDRATE 4 MILLION UNITS: 5 INJECTION INTRAVENOUS at 09:31

## 2021-08-20 RX ADMIN — HYDROMORPHONE HYDROCHLORIDE 1 MG: 2 INJECTION, SOLUTION INTRAMUSCULAR; INTRAVENOUS; SUBCUTANEOUS at 21:50

## 2021-08-20 RX ADMIN — KETOROLAC TROMETHAMINE 30 MG: 30 INJECTION, SOLUTION INTRAMUSCULAR; INTRAVENOUS at 23:42

## 2021-08-20 RX ADMIN — SODIUM CHLORIDE, PRESERVATIVE FREE 10 ML: 5 INJECTION INTRAVENOUS at 21:41

## 2021-08-20 RX ADMIN — ASPIRIN 81 MG: 81 TABLET, COATED ORAL at 09:30

## 2021-08-20 RX ADMIN — DEXTROSE MONOHYDRATE 4 MILLION UNITS: 5 INJECTION INTRAVENOUS at 16:31

## 2021-08-20 RX ADMIN — DEXTROSE MONOHYDRATE 4 MILLION UNITS: 5 INJECTION INTRAVENOUS at 22:02

## 2021-08-20 RX ADMIN — DOCUSATE SODIUM 100 MG: 100 CAPSULE, LIQUID FILLED ORAL at 09:30

## 2021-08-20 RX ADMIN — Medication 10 ML: at 23:42

## 2021-08-20 RX ADMIN — ENOXAPARIN SODIUM 40 MG: 40 INJECTION SUBCUTANEOUS at 09:30

## 2021-08-20 ASSESSMENT — PAIN SCALES - GENERAL
PAINLEVEL_OUTOF10: 6
PAINLEVEL_OUTOF10: 5
PAINLEVEL_OUTOF10: 8
PAINLEVEL_OUTOF10: 7
PAINLEVEL_OUTOF10: 7
PAINLEVEL_OUTOF10: 10
PAINLEVEL_OUTOF10: 7
PAINLEVEL_OUTOF10: 8
PAINLEVEL_OUTOF10: 5
PAINLEVEL_OUTOF10: 7
PAINLEVEL_OUTOF10: 8
PAINLEVEL_OUTOF10: 7
PAINLEVEL_OUTOF10: 6
PAINLEVEL_OUTOF10: 7
PAINLEVEL_OUTOF10: 0
PAINLEVEL_OUTOF10: 8
PAINLEVEL_OUTOF10: 10
PAINLEVEL_OUTOF10: 10
PAINLEVEL_OUTOF10: 7
PAINLEVEL_OUTOF10: 0
PAINLEVEL_OUTOF10: 10
PAINLEVEL_OUTOF10: 5

## 2021-08-20 ASSESSMENT — PAIN DESCRIPTION - LOCATION
LOCATION: CHEST
LOCATION: CHEST
LOCATION: RIB CAGE
LOCATION: CHEST

## 2021-08-20 ASSESSMENT — PAIN DESCRIPTION - ORIENTATION
ORIENTATION: LEFT

## 2021-08-20 ASSESSMENT — PAIN DESCRIPTION - PAIN TYPE
TYPE: ACUTE PAIN
TYPE: ACUTE PAIN;SURGICAL PAIN
TYPE: ACUTE PAIN
TYPE: ACUTE PAIN;SURGICAL PAIN

## 2021-08-20 ASSESSMENT — PAIN DESCRIPTION - DESCRIPTORS
DESCRIPTORS: ACHING;CONSTANT;SHARP
DESCRIPTORS: ACHING;CONSTANT;SORE
DESCRIPTORS: SHARP

## 2021-08-20 ASSESSMENT — PAIN DESCRIPTION - FREQUENCY
FREQUENCY: CONTINUOUS
FREQUENCY: CONTINUOUS

## 2021-08-20 ASSESSMENT — PAIN DESCRIPTION - ONSET
ONSET: ON-GOING
ONSET: ON-GOING

## 2021-08-20 NOTE — PROGRESS NOTES
Infectious Disease Follow up Notes    CC :  Streptococcal bacteremia      Antibiotics:   pcnG 4mu q4    Admit Date:   8/15/2021  Hospital Day: 6    Subjective:   Tm 99.9 at 3am, now AF. Currently on RA   Total output from CT to date 240 - cloudy, yellow fluid   BM x2 yesterday   Dilaudid 1mg x5 doses, toradol x3 Percocet x3 in 24 hours  No new focal complaints. Mild discomfort from the L CT well controlled. No coughing. Objective:     Patient Vitals for the past 8 hrs:   BP Temp Temp src Pulse Resp SpO2   08/20/21 0800 (!) 139/93 99.1 °F (37.3 °C) Axillary 115 16 91 %   08/20/21 0413 (!) 142/94   126  93 %   08/20/21 0403 (!) 147/91   133  92 %   08/20/21 0340 (!) 157/110   125     08/20/21 0332 (!) 150/97 99.9 °F (37.7 °C)  120 19 92 %   08/20/21 0330 (!) 150/97 99.8 °F (37.7 °C)  128  (!) 82 %       EXAM:  General:  A&O, NAD     HEENT:  NCAT, PERRL, sclera anicteric   NECK:   Supple, symmetric   LUNGS:   Non-labored breathing. Decreased breath sounds posteriorly on L.  R clear.    CV:   RRR without murmur  ABD: soft, flat, NT   EXT:  No focal rash, no LE edema          LINE: PICC LUE in place, site ok          Scheduled Meds:   alteplase (ACTIVASE) syringe  10 mg Intrapleural Once    And    dornase (PULMOZYME) syringe  5 mg Intrapleural Once    metoprolol tartrate  25 mg Oral BID    enoxaparin  40 mg Subcutaneous Daily    docusate sodium  100 mg Oral Daily    atorvastatin  40 mg Oral Nightly    aspirin EC  81 mg Oral Daily    sodium chloride flush  5-40 mL Intravenous 2 times per day    penicillin G  4 Million Units Intravenous Q4H       Continuous Infusions:   sodium chloride      sodium chloride 75 mL/hr at 08/19/21 2320          Data Review:    Lab Results   Component Value Date    WBC 18.2 (H) 08/20/2021    HGB 12.3 (L) 08/20/2021    HCT 35.6 (L) 08/20/2021    MCV 87.2 08/20/2021     08/20/2021 Lab Results   Component Value Date    CREATININE 0.9 08/20/2021    BUN 8 08/20/2021     (L) 08/20/2021    K 4.1 08/20/2021    CL 97 (L) 08/20/2021    CO2 25 08/20/2021       Hepatic Function Panel:   Lab Results   Component Value Date    ALKPHOS 222 08/20/2021    ALT 55 08/20/2021    AST 30 08/20/2021    PROT 7.1 08/20/2021    PROT 7.0 03/07/2010    BILITOT 0.9 08/20/2021    BILIDIR 0.5 08/17/2021    IBILI 0.3 08/17/2021    LABALBU 3.0 08/20/2021         MICRO:  8/14     BC #1  S pyogenes              BC #2 neg              Throat GABHS screen +               COVID NAAT neg   8/16     COVID NAAT neg                     COVID PCR neg   8/17 BC x2 NGTD   8/19 Pleural fluid cultures NGTD, GS 3+wbc,no organisms seen        IMAGING:  CT chest 8/15/21  Impression   1. Multifocal airspace disease, including scattered nodular airspace disease. Correlate with presentation.  Follow-up to resolution recommended. 2. Suboptimal opacification of the pulmonary arteries.  No acute pulmonary   embolism to the lobar arteries given limitation. 3. Other findings as described.      CT neck 8/16/21 negative     CT L spine negative    CT CAP 8/18/21  Impression   1. Significant worsening of left lower lobe pneumonia with developing   loculated parapneumonic effusion. 2. Several rapidly enlarging right-sided subpleural pulmonary nodules. Septic emboli are a diagnostic consideration. 3. No acute abdominopelvic abnormality.        Assessment:     Patient Active Problem List    Diagnosis Date Noted    Loculated pleural effusion     Septic embolism (HCC)     Atelectasis of left lung     Abnormal LFTs     Former smoker     Marijuana smoker     Pharyngitis due to Streptococcus species 08/16/2021    S/P patent foramen ovale closure 08/16/2021    Streptococcal bacteremia 08/15/2021    Sebaceous cyst     Closed displaced fracture of base of fourth metacarpal bone of right hand 06/27/2019    Closed displaced fracture of body of hamate of right wrist 06/27/2019    PFO (patent foramen ovale)     Cerebrovascular accident (CVA) [address] [address] 03/11/2019    Leukocytosis 03/11/2019    Head ache 03/11/2019    Acute medial meniscal tear 07/29/2014    ACL tear 07/29/2014        S pyogenes bacteremia, +BC on 8/14/21  Acute throat/neck pain with +throat GABHS test  Septic pulmonary emboli and empyema   S/p CT placement 8/19/21. Pleural fluid studies pending. GS no organisms seen. Pleural effusion increased today on CXR - d/w Pulm. CTS was consulted by Pulm   Recent BC are negative to date  He is afebrile though has received some acetaminophen. Lacks evolving localizing complaints to suggest other sites of metastatic infection   -continue IV pcn as ordered     Elevated LFTs  Liver appears normal on CT      History of PFO s/p repair    GARDENIA discussed with Cardiology yesterday. Lesion on the closure device in L atrium most likely the post of the device   No valvular vegetations seen.       Rising WBC despite culture directed abx   No new localizing complaints. Recent BC are negative.    -Continue same abx and monitor, hope to see decline once the pleural space is better drained.   If not, will probably need to broaden abx therapy     NKDA       Discussed with patient/family, all questions answered  D/w RN, Dr. Geno Coates MD  Phone: [phone]   Fax : [phone]

## 2021-08-20 NOTE — PROGRESS NOTES
Hospitalist Progress Note      PCP: Parth Mckeon MD    Date of Admission: 8/15/2021    Chief Complaint: positive blood cx    Subjective: no new c/o. Medications:  Reviewed    Infusion Medications    sodium chloride      sodium chloride 75 mL/hr at 08/19/21 2320     Scheduled Medications    alteplase (ACTIVASE) syringe  10 mg Intrapleural Once    And    dornase (PULMOZYME) syringe  5 mg Intrapleural Once    metoprolol tartrate  25 mg Oral BID    enoxaparin  40 mg Subcutaneous Daily    docusate sodium  100 mg Oral Daily    atorvastatin  40 mg Oral Nightly    aspirin EC  81 mg Oral Daily    sodium chloride flush  5-40 mL Intravenous 2 times per day    penicillin G  4 Million Units Intravenous Q4H     PRN Meds: HYDROmorphone, hydrALAZINE, oxyCODONE-acetaminophen, perflutren lipid microspheres, oxyCODONE-acetaminophen, ketorolac, sodium chloride flush, sodium chloride, ondansetron **OR** ondansetron, polyethylene glycol, acetaminophen **OR** acetaminophen, benzocaine-menthol, perflutren lipid microspheres      Intake/Output Summary (Last 24 hours) at 8/20/2021 0908  Last data filed at 8/20/2021 1292  Gross per 24 hour   Intake 430 ml   Output 3115 ml   Net -2685 ml       Physical Exam Performed:    BP (!) 139/93   Pulse 115   Temp 99.1 °F (37.3 °C) (Axillary)   Resp 16   Ht 6' (1.829 m)   Wt 178 lb (80.7 kg)   SpO2 91%   BMI 24.14 kg/m²     General appearance: No apparent distress, appears stated age and cooperative. HEENT: Pupils equal, round, and reactive to light. Conjunctivae/corneas clear. Neck: Supple, with full range of motion. No jugular venous distention. Trachea midline. Respiratory:  Normal respiratory effort. Clear to auscultation, bilaterally without Rales/Wheezes/Rhonchi. Cardiovascular: Regular rate and rhythm with normal S1/S2 with 1/6 MAR but no rubs or gallops. Abdomen: Soft, non-tender, non-distended with normal bowel sounds.   Musculoskeletal: No clubbing, cyanosis or edema bilaterally. Full range of motion without deformity. Skin: Skin color, texture, turgor normal.  No rashes or lesions. Neurologic:  Neurovascularly intact without any focal sensory/motor deficits. Cranial nerves: II-XII intact, grossly non-focal.  Psychiatric: Alert and oriented, thought content appropriate, normal insight  Capillary Refill: Brisk,< 3 seconds   Peripheral Pulses: +2 palpable, equal bilaterally       Labs:   Recent Labs     08/18/21  0653 08/19/21  0540 08/20/21  0336   WBC 12.0* 16.4* 18.2*   HGB 12.1* 11.5* 12.3*   HCT 35.1* 34.1* 35.6*    314 435     Recent Labs     08/18/21  0653 08/19/21  0540 08/20/21  0336    137 134*   K 3.9 4.2 4.1    102 97*   CO2 28 26 25   BUN 6* 5* 8   CREATININE 0.8* 0.8* 0.9   CALCIUM 8.8 8.2* 9.0   PHOS 4.7  --  3.8     Recent Labs     08/17/21  1449 08/17/21  1449 08/18/21  0653 08/19/21  0540 08/20/21  0336   AST 59*   < > 54* 37 30   ALT 71*   < > 77* 62* 55*   BILIDIR 0.5*  --   --   --   --    BILITOT 0.8   < > 0.9 0.9 0.9   ALKPHOS 198*   < > 202* 176* 222*    < > = values in this interval not displayed. Recent Labs     08/18/21  1304   INR 1.22*     No results for input(s): Franco Dani in the last 72 hours.     Urinalysis:      Lab Results   Component Value Date    NITRU Negative 08/14/2021    WBCUA 6-9 08/14/2021    BACTERIA 3+ 08/14/2021    RBCUA 11-20 08/14/2021    BLOODU MODERATE 08/14/2021    SPECGRAV 1.025 08/14/2021    GLUCOSEU Negative 08/14/2021       Consults:    IP CONSULT TO HOSPITALIST  IP CONSULT TO PHARMACY  IP CONSULT TO INFECTIOUS DISEASES  IP CONSULT TO PULMONOLOGY  IP CONSULT TO CARDIOLOGY      Assessment/Plan:    Active Hospital Problems    Diagnosis     Loculated pleural effusion [J90]     Septic embolism (HCC) [I76]     Atelectasis of left lung [J98.11]     Abnormal LFTs [R94.5]     Former smoker [Z87.891]     Marijuana smoker [F12.90]     Pharyngitis due to Streptococcus species

## 2021-08-20 NOTE — CONSULTS
Department of Cardiovascular & Thoracic Surgery  History and Physical          DIAGNOSIS: Left empyema, streptococcal bacteremia, possible vegetation of PFO closure device     CHIEF COMPLAINT:    Chief Complaint   Patient presents with    Other     Pt was called for a positive blood culture result and told to come back to the ED. Pt was seen and dx'd with strep yesterday.  Back Pain       History Obtained From:  patient, electronic medical record    HISTORY OF PRESENT ILLNESS:      The patient is a 30 y.o. male, former smoker, with significant past medical history of CVA and PFO s/p closure (5/9/19, Michelle Tinajero) who presented to [facility] ED initially on 8/14 with complaints of fevers, sore throat, headaches and body aches for 3 days. He had been seen at urgent care. A rapid COVID was completed and was negative. ED workup notable for positive strep cultures. He was tachycardic with leukocytosis. He was given IVFs and Bicillin. H was discharged home. His blood cultures were positive for streptococcus. He was called and instructed to return to the ED for further treatment. He had improvement in his sore throat at this time but continued to have back pain that was worsened with inspiration. A CT PE was obtained and was negative for PE but showed scattered nodular disease. Nessa Graven LFTs slightly elevated. He was admitted for continued antibiotics and further evaluation. Repeat CT chest on 8/18 showed left sided parapneumonic effusion. Pulmonology was consulted. A chest tube was placed by IR on 8/19. An echo was obtained due to concerns of septic pulmonary emboli. TTE did not showed any evidence of vegetation. GARDENIA was obtained on 8/19 and showed small echodensity attached to the PFO closure device concerning for possible vegetation. No valvular vegetations were noted. We have been consulted for further evaluation and recommendations.      Past Medical History:        Diagnosis Date    CVA (cerebral vascular accident) ([address] 2019 no residuals EXCEPT PERIPHERAL RT SIDE VISION    Hernia     History of bone graft     PFO (patent foramen ovale) 2019    PFO closure with 25 mm PFO occluder device    TMJ (dislocation of temporomandibular joint)        Past Surgical History:        Procedure Laterality Date    ANTERIOR CRUCIATE LIGAMENT REPAIR Left 09/10/2014    BONE GRAFT      left wrist    CARDIAC SURGERY  2019    PFO  plACED AFTER STROKE    HAND SURGERY      right thumb pins    PRE-MALIGNANT / BENIGN SKIN LESION EXCISION N/A 2020    SEBACEOUS CYST EXCISION, POSTERIOR SCALP performed by Sylvie Sun MD at [address] [facility] TRANSESOPHAGEAL ECHOCARDIOGRAM  2021    GARDENIA with Dr Flores Spine       Medications Prior to Admission:   Medications Prior to Admission: [] traMADol (ULTRAM) 50 MG tablet, Take 1 tablet by mouth every 6 hours as needed for Pain for up to 3 days. Intended supply: 7 days. Take lowest dose possible to manage pain  aspirin EC 81 MG EC tablet, Take 1 tablet by mouth daily  atorvastatin (LIPITOR) 40 MG tablet, Take 1 tablet by mouth nightly    Allergies:  Patient has no known allergies. Social History:    TOBACCO:   reports that he quit smoking about 2 years ago. His smoking use included cigarettes. He smoked 0.33 packs per day. He has quit using smokeless tobacco.  His smokeless tobacco use included snuff. ETOH:   reports current alcohol use of about 6.0 standard drinks of alcohol per week.   CAFFEINE ABUSE:  No  DRUGS:  Smokes marijuana   LIFESTYLE: moderate activity- yard work    MARITAL STATUS:  Engaged   OCCUPATION:  Employed      Family History:        Problem Relation Age of Onset    Cancer Maternal Grandmother     Cancer Maternal Grandfather     Heart Failure Maternal Grandfather     Cancer Paternal Grandmother     High Blood Pressure Paternal Grandmother         MI    Cancer Paternal Grandfather     Other Paternal Grandfather        REVIEW OF SYSTEMS:      Constitutional:  No night sweats, weight loss. + headaches, fevers. Eyes:  No glaucoma, cataracts. + loss of peripheral vision, right eye. ENMT:  No nosebleeds, deviated septum. Cardiac:  No arrhythmias, chest pain. Vascular:  No claudication, varicosities. GI:  No PUD, heartburn. :  No kidney stones, frequent UTIs  Musculoskeletal:  No arthritis, gout. + muscle aches, lower back pain. Respiratory:  No SOB, emphysema, asthma. + former smoker. Integumentary:  No dermatitis, itching, rash. Neurological:  No seizures. + Hx CVA. Psychiatric:  No depression, anxiety. Endocrine: No diabetes, thyroid issues. Hematologic:  No bleeding, easy bruising. Immunologic:  No known cancer, steroid therapies. PHYSICAL EXAM:    VITALS:  BP (!) 139/93   Pulse 115   Temp 99.1 °F (37.3 °C) (Axillary)   Resp 16   Ht 6' (1.829 m)   Wt 178 lb (80.7 kg)   SpO2 91%   BMI 24.14 kg/m²     Constitutional:   Well developed and nourished male. No acute distress. Eyes:  lids and lashes normal, pupils equal, round and reactive to light, extra ocular muscles intact, sclera clear, conjunctiva normal    Head/ENT:   normal teeth, gums, & palate. Moist mucus membranes. No cyanosis or pallor. Neck:  supple, symmetrical, trachea midline, no lymphadenopathy, no jugular venous distension, no carotid bruits and MASSES:  no masses. Lungs:  no increased work of breathing, good air exchange, no retractions and lungs diminished left base. Chest tube intact to left chest wall. Cardiovascular:  regular rate and rhythm, S1, S2 normal, no murmur, click, rub or gallop. Pulses:  Right dorsalis pedis 2, Left dorsalis pedis 2, Right posterior tibial 2, Left Posterior tibial 2, Right radial 2, and Left radial 2. Abdomen:  normal bowel sounds, non-tender. No hepatosplenomegaly or masses. Musculoskeletal:  Back is straight and non-tender, full ROM of upper and lower extremities. No kyphosis or scoliosis.     Extremities:  Warm, pink, no clubbing, cyanosis, petechiae, ischemia, or deformities. No peripheral edema.     Skin: no ecchymoses, no petechiae, no nodules, no jaundice, no purpura, no wounds    Neurological/Psychiatric: oriented, normal mood, grossly non-focal    DATA:  EK/9/19  Normal sinus rhythmVoltage criteria for left ventricular hypertrophyAbnormal ECGConfirmed by Grant VERMA MD ([id number]) on 2019 12:59:46 PM    8/15/21  Sinus tachycardiaBiatrial enlargementLeft axis deviationNonspecific ST abnormalityAbnormal ECGWhen compared with ECG of 14-AUG-2021 18:42,No significant change was foundConfirmed by CARI Lam MD ([id number]) on 2021 7:44:26 AM    CBC:   Lab Results   Component Value Date    WBC 18.2 2021    RBC 4.09 2021    HGB 12.3 2021    HCT 35.6 2021    MCV 87.2 2021    MCH 30.2 2021    MCHC 34.7 2021    RDW 14.3 2021     2021    MPV 7.2 2021     CMP:    Lab Results   Component Value Date     2021    K 4.1 2021    K 3.6 2021    CL 97 2021    CO2 25 2021    BUN 8 2021    CREATININE 0.9 2021    GFRAA >60 2021    GFRAA >60 2010    AGRATIO 0.7 2021    LABGLOM >60 2021    GLUCOSE 121 2021    PROT 7.1 2021    PROT 7.0 2010    LABALBU 3.0 2021    CALCIUM 9.0 2021    BILITOT 0.9 2021    ALKPHOS 222 2021    AST 30 2021    ALT 55 2021     Hepatic Function Panel:    Lab Results   Component Value Date    ALKPHOS 222 2021    ALT 55 2021    AST 30 2021    PROT 7.1 2021    PROT 7.0 2010    BILITOT 0.9 2021    BILIDIR 0.5 2021    IBILI 0.3 2021    LABALBU 3.0 2021     PT/INR:    Lab Results   Component Value Date    PROTIME 13.9 2021    INR 1.22 2021     Troponin:    Lab Results   Component Value Date    TROPONINI <0.01 2021     CXRAY:    21  FINDINGS:   Lung at 60   mmHg (RAP 15 mmHg) c/w mod-severe pulmonary hypertension. No obvious masses, thrombi, or vegetations are noted. GARDENIA:   8/19/21  Summary   Ejection fraction is visually estimated at 55%. A 25mm Amplatzer device appears well seated with no evidence of shunting by   color flow or bubble study. Small echodensity visualized in central part of left atrial side of device   which likely represents normal post structure. Cannot fully r/o infection of   device by this study and if clinical suspicion for infection high with no   other source identified then would treat with antibiotics for full   appropriate time per ID doc recommendations. No evidence of valvular vegetations. Eustachian valve and catheter are visualized in right atrium. CT CAP:  8/18/21  FINDINGS:       Chest:       Mediastinum: The central airways are patent.  There is no hilar or   mediastinal adenopathy.       Lungs/pleura: Dense consolidation has significantly progressed throughout the   left lower lobe with central air bronchograms.  A small to moderate-sized   multiloculated left pleural effusion is now noted.  There is patchy   consolidation within the right middle lobe and right posterior costophrenic   angle.  Several small noncalcified right-sided pleural based soft tissue   nodules are have doubled in size over the interval with the largest now   measuring 11 mm.       Soft Tissues/Bones: There is no acute fracture or aggressive osseous lesion.           Abdomen/Pelvis:       Organs: The liver, pancreas, spleen, kidneys and adrenals are unremarkable.       GI/Bowel: There is no bowel dilatation, wall thickening or obstruction.       Pelvis: The bladder and prostate are unremarkable.       Peritoneum/Retroperitoneum: There is no free air, free fluid or   intraperitoneal inflammatory change.  There is no adenopathy.       Bones/Soft Tissues: There is no acute fracture or aggressive osseous lesion.           Impression   1. Significant worsening of left lower lobe pneumonia with developing   loculated parapneumonic effusion. 2. Several rapidly enlarging right-sided subpleural pulmonary nodules. Septic emboli are a diagnostic consideration. 3. No acute abdominopelvic abnormality. Stress test: 12/27/19    Conclusions      Summary   Normal treadmill exercise test.    [facility]:  No prior study available in EMR for review. ASSESSMENT AND PLAN:    Mr. Melissa Pleitez is a 30 y.o. male, former smoker, with history of CVA and PFO closure (25 mm Amplatzer device, 5/19 with Dr. Kalyan Flannery) admitted with strep bacteremia and found to have a loculated left parapneumonic effusion. Additionally, during the course of his workup a GARDENIA was performed and showed echodensity on the left atrial side of the PFO closure device suggestive of possible vegetation. Pulmonology is following and planning for fibrinolytics. Will plan for re-evaluation with CT on Monday. If no improvement will need VATS with decortication. In regards to the possible vegetation on his occluder device, will plan to reevaluate with echo following completion of antibiotic course. If there is evidence of further showering prior to that time, would likely need to proceed with removal of closure device. Nicolas Vaughan, JAMES - CNP  8/20/2021  10:04 AM  Note reviewed, events of last 24 hours reviewed along with vital signs and I/Os and patient examined. Assessment and plans discussed and are as outlined above.      Daisy Salomon MD, FACS, [facility], [place], [place]

## 2021-08-20 NOTE — PLAN OF CARE
Problem: Pain:  Goal: Pain level will decrease  Description: Pain level will decrease  Outcome: Ongoing     Problem: Infection - Central Venous Catheter-Associated Bloodstream Infection:  Goal: Will show no infection signs and symptoms  Description: Will show no infection signs and symptoms  Outcome: Ongoing

## 2021-08-20 NOTE — PROGRESS NOTES
PT woke up to pain 10/10 left posterior rib cage. PT 's /97 pt tachypneic. PT given Dilaudid. PT became more relaxed respirations slowed. PT O2 82-83 on room air. PT placed on 2-L O2 now 95-98% PT BP now 147/91 pt is calm resting with eyes closed. Breath sounds remain diminished. PT chest tube output was marked 45 ml when this RN assumed care. Chest tube marked 85 at hand off report.  Chest tube Dressing dry clean intact no crepitus felt

## 2021-08-20 NOTE — PLAN OF CARE
I was asked to relay results of GARDENIA from Dr. Dia Bautista to patient and family. The GARDENIA showed an echodensity on left atrial side of closure device. No valvular vegetation seen. Dr. Dia Bautista reports he reviewed images with Dr. Inez Acosta and Dr. Tanya Hull (placed amplatz device 2 years ago) and the echodensity is felt to be part of the device not a true vegetation. He after he completes full treatment of antibiotics per ID would recommend repeating GAREDNIA to relook. Relayed this information to patient and family. For further information, please contact Dr. Arnaud Lion or Dr. Bharti Baca.    Mackenzie Molina, APRN - CNP

## 2021-08-20 NOTE — PROGRESS NOTES
INPATIENT PULMONARY CRITICAL CARE PROGRESS NOTE      Reason for visit: Group B streptococcal bacteremia, strep throat, left loculated effusion/empyema, findings suggestive of septic emboli prior PFO closure. SUBJECTIVE: The patient remains hemodynamically stable, has had low-grade fever. He has low normal oxygen saturation on room air. His chest pain is slightly decreased, he has a good appetite, denies other complaints. Chest tube output 240 mL. Physical Exam:  Blood pressure (!) 139/93, pulse 115, temperature 99.1 °F (37.3 °C), temperature source Axillary, resp. rate 16, height 6' (1.829 m), weight 178 lb (80.7 kg), SpO2 91 %.'   Constitutional: Pleasant, averagely built, not ill-appearing. In minimal pain due to chest tube  HENT: Oropharynx is clear and moist.  Class II airway, no dental lesions. Eyes:  Conjunctivae are normal.  No scleral icterus. Neck: No JVD. Cardiovascular: Normal rate, regular rhythm, normal heart sounds. Pulmonary/Chest: No wheezes. No rales. Air entry significantly reduced left hemithorax   Abdominal: Deferred. Musculoskeletal: No cyanosis. No clubbing. No obvious joint deformity. Lymphadenopathy: No cervical or supraclavicular adenopathy. Skin: Skin is warm and dry. No rash or nodules on the exposed extremities. Psychiatric: Normal mood and affect. Behavior is normal.  No anxiety. Neurologic: Alert, awake and oriented. PERRL. Speech fluent.   No obvious neurologic deficit, detailed exam not performed      Results:  CBC:   Recent Labs     08/18/21  0653 08/19/21  0540 08/20/21  0336   WBC 12.0* 16.4* 18.2*   HGB 12.1* 11.5* 12.3*   HCT 35.1* 34.1* 35.6*   MCV 88.4 88.7 87.2    314 435     BMP:   Recent Labs     08/18/21  0653 08/19/21  0540 08/20/21  0336    137 134*   K 3.9 4.2 4.1    102 97*   CO2 28 26 25   PHOS 4.7  --  3.8   BUN 6* 5* 8   CREATININE 0.8* 0.8* 0.9     LIVER PROFILE:   Recent Labs     08/17/21  1449 08/17/21  1449 08/18/21  0653 08/19/21  0540 08/20/21  0336   AST 59*   < > 54* 37 30   ALT 71*   < > 77* 62* 55*   BILIDIR 0.5*  --   --   --   --    BILITOT 0.8   < > 0.9 0.9 0.9   ALKPHOS 198*   < > 202* 176* 222*    < > = values in this interval not displayed. PT/INR:   Recent Labs     08/18/21  1304   PROTIME 13.9*   INR 1.22*       Imaging:  I have reviewed radiology images personally. XR CHEST PORTABLE   Final Result   Moderate left pleural effusion, increased as compared to prior. Bibasilar atelectasis or airspace disease. CT GUIDED PLEURAL DRAINAGE W [facility]   Preliminary Result   Successful CT-guided chest tube placement. IR PICC WO SQ PORT/PUMP > 5 YEARS   Final Result   Successful placement of PICC line. CT CHEST ABDOMEN PELVIS W CONTRAST   Final Result   1. Significant worsening of left lower lobe pneumonia with developing   loculated parapneumonic effusion. 2. Several rapidly enlarging right-sided subpleural pulmonary nodules. Septic emboli are a diagnostic consideration. 3. No acute abdominopelvic abnormality. XR CHEST (2 VW)   Final Result   Increased bibasilar airspace disease with small left-sided pleural effusion         CT SOFT TISSUE NECK WO CONTRAST   Final Result   No acute abnormality of the soft tissue structures of the neck. CT CHEST PULMONARY EMBOLISM W CONTRAST   Final Result   1. Multifocal airspace disease, including scattered nodular airspace disease. Correlate with presentation. Follow-up to resolution recommended. 2. Suboptimal opacification of the pulmonary arteries. No acute pulmonary   embolism to the lobar arteries given limitation. 3. Other findings as described.            Echo Complete    Result Date: 8/18/2021  Transthoracic Echocardiography Report (TTE)  Demographics   Patient Name       Isaías Locket   Date of Study      08/18/2021         Gender              Male   Patient Number     [id number]         Date of Birth 1985   Visit Number       [id number]          Age                 30 year(s)   Accession Number   [id number]         Room Number         0522   Corporate ID       [id number]           Sonographer         [facility]   Ordering Physician Skylar Castro MD       Physician           Daniella Quispe MD, [facility]  Procedure Type of Study   TTE procedure:ECHOCARDIOGRAM COMPLETE 2D W DOPPLER W COLOR. Procedure Date Date: 08/18/2021 Start: 08:19 AM Study Location: [address] [place] [address] - Echo Lab Technical Quality: Adequate visualization Additional Indications:Bacteremia. Patient Status: Routine Height: 72 inches Weight: 178 pounds BSA: 2.03 m2 BMI: 24.14 kg/m2 BP: 138/90 mmHg  Conclusions   Summary  Normal LV systolic function with a visually estimated ejection fraction of  55-60%. No regional wall motion abnormalities are seen. EF calculated by 3D at 59%. A 25 mm Amplatzer closure device is visualized and appears well seated. No  obvious shunt from color flow. Normal left ventricular diastolic filling pressure. Mild eccentric tricuspid regurgitation. Systolic pulmonary artery pressure (SPAP) is elevated and estimated at 60  mmHg (RAP 15 mmHg) c/w mod-severe pulmonary hypertension. No obvious masses, thrombi, or vegetations are noted. Signature   ------------------------------------------------------------------  Electronically signed by Daniella Quispe MD, [facility]  (Interpreting physician) on 08/18/2021 at 10:17 AM  ------------------------------------------------------------------   Findings   Left Ventricle  Normal left ventricle size, wall thickness, and systolic function with a  visually estimated ejection fraction of 55-60%. EF calculated by 3D at 59%. No regional wall motion abnormalities are seen. Normal left ventricular diastolic filling pressure.    Mitral Valve The mitral valve is mildly calcified with no restriction of motion. Trace mitral regurgitation. Left Atrium  The left atrium is at the upper limits of normal in size. Aortic Valve  The aortic valve appears normal in structure and function. There is no evidence of aortic regurgitation. Aorta  The aortic root is normal in size. Right Ventricle  The right ventricle is normal in size and function. TAPSE is measured at 27 mm. S velocity is measured at 17.5 cm/s. Tricuspid Valve  The tricuspid valve is normal in structure. Mild eccentric tricuspid regurgitation. Systolic pulmonary artery pressure (SPAP) is elevated and estimated at 60  mmHg (right atrial pressure 15 mmHg) consistent with moderate to severe  pulmonary hypertension. Right Atrium  The right atrium is normal in size. Right atrial area is 19 cm2. Right atrial volume is 22.7 ml/m2. Pulmonic Valve  The pulmonic valve is normal in structure and function. No evidence of pulmonic regurgitation. Pericardial Effusion  No pericardial effusion noted. Pleural Effusion  No pleural effusion noted. Miscellaneous  A 25 mm Amplatzer closure device is visualized and appears well seated. No  obvious shunt from color flow. The IVC is dilated in size (>2.1 cm) and collapses <50% with respiration  consistent with markedly elevated right atrial pressures (15 mmHg) . No obvious masses, thrombi, or vegetations are noted.   M-Mode/2D Measurements (cm)   LV Diastolic Dimension: 4.43 cm LV Systolic Dimension: 3.05 cm  LV Septum Diastolic: 0.10 cm  LV PW Diastolic: 0.9 cm         AO Root Dimension: 3.5 cm                                  LA Dimension: 3.7 cm                                  LA Area: 22 cm2  LVOT: 2 cm                      LA volume/Index: 68.85 ml /34 ml/m2  Doppler Measurements   AV Peak Velocity: 136 cm/s     MV Peak E-Wave: 87.4 cm/s  AV Peak Gradient: 7.4 mmHg     MV Peak A-Wave: 70.3 cm/s  LVOT Peak Velocity: 124 cm/s   MV E/A Ratio: Initial FINDINGS: The vertebral body heights are maintained. The disc spaces are maintained. There is no spondylolisthesis. The visualized bony pelvis is intact. The surrounding soft tissues are unremarkable. No acute vertebral body or disc space abnormality. CT SOFT TISSUE NECK WO CONTRAST    Result Date: 8/16/2021  EXAMINATION: CT OF THE NECK WITHOUT CONTRAST  8/16/2021 TECHNIQUE: CT of the neck was performed without the administration of intravenous contrast. Multiplanar reformatted images are provided for review. Dose modulation, iterative reconstruction, and/or weight based adjustment of the mA/kV was utilized to reduce the radiation dose to as low as reasonably achievable. COMPARISON: None. HISTORY: ORDERING SYSTEM PROVIDED HISTORY: evaluate for suppurative complication of pharyngitis TECHNOLOGIST PROVIDED HISTORY: Reason for exam:->evaluate for suppurative complication of pharyngitis Reason for Exam: sore throat, swollen rt node, difficulty swallowing x 6 days Acuity: Acute Type of Exam: Initial FINDINGS: PHARYNX/LARYNX:  The palatine tonsils are normal in appearance. The tongue is normal in appearance. The valleculae, epiglottis, aryepiglottic folds and pyriform sinuses appear unremarkable. The true and false vocal cords are normal in appearance. No mass or abscess is seen. SALIVARY GLANDS/THYROID:  The parotid and submandibular glands appear unremarkable. The thyroid gland appears unremarkable. LYMPH NODES:  No cervical or supraclavicular lymphadenopathy is seen. SOFT TISSUES:  No appreciable soft tissue swelling or mass is seen. BRAIN/ORBITS/SINUSES:  The visualized portion of the intracranial contents appear unremarkable. The visualized portion of the orbits, paranasal sinuses and mastoid air cells demonstrate no acute abnormality. LUNG APICES/SUPERIOR MEDIASTINUM:  No focal consolidation is seen within the visualized lung apices. No superior mediastinal lymphadenopathy or mass.  The visualized portion of the trachea appears unremarkable. BONES:  No aggressive appearing lytic or blastic bony lesion. No acute abnormality of the soft tissue structures of the neck. CT LUMBAR SPINE W CONTRAST    Result Date: 8/14/2021  EXAMINATION: CT OF THE LUMBAR SPINE WITH CONTRAST  8/14/2021 9:12 pm TECHNIQUE: CT of the lumbar spine was performed with the administration of intravenous contrast. Multiplanar reformatted images are provided for review. Dose modulation, iterative reconstruction, and/or weight based adjustment of the mA/kV was utilized to reduce the radiation dose to as low as reasonably achievable. COMPARISON: None HISTORY: ORDERING SYSTEM PROVIDED HISTORY: low back pain, fever TECHNOLOGIST PROVIDED HISTORY: Reason for exam:->low back pain, fever Decision Support Exception - unselect if not a suspected or confirmed emergency medical condition->Emergency Medical Condition (MA) Reason for Exam: severe b ack pain x few days. more on the left side. fever, sore throath Acuity: Acute Type of Exam: Initial FINDINGS: BONES/ALIGNMENT:  There is normal alignment of the spine. The vertebral body heights are maintained. No osseous destructive lesion is seen. SOFT TISSUES: No acute paraspinal soft tissue abnormality. No abnormal fluid collection or enhancement. Incidental 8 mm left renal cortical cyst appears benign. L1-L2: There is no significant disc protrusion, central spinal canal stenosis or neural foraminal narrowing. L2-L3: There is no significant disc protrusion, central spinal canal stenosis or neural foraminal narrowing. L3-L4: There is no significant disc protrusion, central spinal canal stenosis or neural foraminal narrowing. L4-L5: There is no significant disc protrusion, central spinal canal stenosis or neural foraminal narrowing. L5-S1: There is no significant disc protrusion, central spinal canal stenosis or neural foraminal narrowing.      Unremarkable contrast enhanced CT of the intravenous contrast. Multiplanar reformatted images are provided for review. Dose modulation, iterative reconstruction, and/or weight based adjustment of the mA/kV was utilized to reduce the radiation dose to as low as reasonably achievable. COMPARISON: 08/15/2021 HISTORY: ORDERING SYSTEM PROVIDED HISTORY: elevated LFTs, pna w effusion TECHNOLOGIST PROVIDED HISTORY: Reason for exam:->elevated LFTs, pna w effusion Additional Contrast?->Oral FINDINGS: Chest: Mediastinum: The central airways are patent. There is no hilar or mediastinal adenopathy. Lungs/pleura: Dense consolidation has significantly progressed throughout the left lower lobe with central air bronchograms. A small to moderate-sized multiloculated left pleural effusion is now noted. There is patchy consolidation within the right middle lobe and right posterior costophrenic angle. Several small noncalcified right-sided pleural based soft tissue nodules are have doubled in size over the interval with the largest now measuring 11 mm. Soft Tissues/Bones: There is no acute fracture or aggressive osseous lesion. Abdomen/Pelvis: Organs: The liver, pancreas, spleen, kidneys and adrenals are unremarkable. GI/Bowel: There is no bowel dilatation, wall thickening or obstruction. Pelvis: The bladder and prostate are unremarkable. Peritoneum/Retroperitoneum: There is no free air, free fluid or intraperitoneal inflammatory change. There is no adenopathy. Bones/Soft Tissues: There is no acute fracture or aggressive osseous lesion. 1. Significant worsening of left lower lobe pneumonia with developing loculated parapneumonic effusion. 2. Several rapidly enlarging right-sided subpleural pulmonary nodules. Septic emboli are a diagnostic consideration. 3. No acute abdominopelvic abnormality.      CT GUIDED PLEURAL DRAINAGE W CATH PERC    Result Date: 8/19/2021  PROCEDURE: CT PERC CATH PLEURAL DRAIN W IMAGIN MODERATE CONSCIOUS SEDATION 8/19/2021 HISTORY: ORDERING SYSTEM PROVIDED HISTORY: Loculated left effusion, picture of septic emboli TECHNOLOGIST PROVIDED HISTORY: Reason for exam:->Loculated left effusion, picture of septic emboli Which side should the procedure be performed? ->Left Reason for Exam: left lung drainage and tube placement Relevant Medical/Surgical History: no fluoro used TECHNIQUE: Dose modulation, iterative reconstruction, and/or weight based adjustment of the mA/kV was utilized to reduce the radiation dose to as low as reasonably achievable. CONTRAST: None. SEDATION: Moderate conscious sedation with versed and fentanyl, was intravenously administered by a nurse under controlled circumstances. Duration of sedation was approximately 15 minutes. ESTIMATED BLOOD LOSS: Minimal. FLUOROSCOPY DOSE AND TYPE OR TIME AND EXPOSURES: None. DESCRIPTION OF PROCEDURE: Informed consent was obtained after a detailed explanation of the procedure including risks, benefits, and alternatives. Universal protocol was observed. Initial CT evaluation demonstrated a moderate to large left-sided pleural effusion. An appropriate skin entry site was chosen. The skin was sterilely prepped and draped. Local anesthesia achieved with 1% lidocaine. A 10 Icelandic locking pigtail drainage catheter was advanced into the inferior portion of the pleural effusion using Trocar technique. The pigtail was formed and locked. There was rapid return of cloudy yellow fluid. The catheter was secured to the skin and placed to Pleur-vac drainage. Samples were sent for culture and sensitivity. Successful CT-guided chest tube placement. Assessment and plan:  Left empyema. CT-guided chest tube placed by IR, small amount of fluid. Lab studies pending. Attempted to place fibrinolytic today. Was able to place dornase, the second syringe of fluid with TPA could not be instilled. Will contact IR to try and unclog the chest tube. Doubt he will need a new chest tube.   Discussed with cardiothoracic surgery, consult placed. ?  Septic emboli. Classic appearance on CT, source unclear. Possibly associated with bacteremia/infective endocarditis. TTE and [place] suggested a small globular echodensity suggestive of vegetation on the left atrial side of the occluder. There was no shunt except for delayed intrapulmonary shunt. Per cardiology and ID  Group B streptococcus bacteremia. On high-dose penicillin G, Dr. Hina Gomez following. Leukocytosis. Mild. Anemia. Normochromic, normocytic. Mild. ?  Microscopic hematuria. The patient denies any symptoms. Can be seen with endocarditis   CVA, ASD closure. Had no shunt on echocardiogram in May 2021. Abnormal LFT. Unclear etiology  DVT prophylaxis. Hold Lovenox until chest tube completed    Discussed with patient, his family, Dr. Mitul Damon and nursing.       Electronically signed by:  Merry Velarde MD    8/20/2021    11:24 AM.

## 2021-08-21 ENCOUNTER — APPOINTMENT (OUTPATIENT)
Dept: GENERAL RADIOLOGY | Age: 36
DRG: 853 | End: 2021-08-21
Payer: COMMERCIAL

## 2021-08-21 LAB
ALBUMIN SERPL-MCNC: 2.8 G/DL (ref 3.4–5)
ALP BLD-CCNC: 175 U/L (ref 40–129)
ALT SERPL-CCNC: 38 U/L (ref 10–40)
ANION GAP SERPL CALCULATED.3IONS-SCNC: 12 MMOL/L (ref 3–16)
AST SERPL-CCNC: 24 U/L (ref 15–37)
BILIRUB SERPL-MCNC: 1.2 MG/DL (ref 0–1)
BILIRUBIN DIRECT: 0.6 MG/DL (ref 0–0.3)
BILIRUBIN, INDIRECT: 0.6 MG/DL (ref 0–1)
BLOOD CULTURE, ROUTINE: NORMAL
BUN BLDV-MCNC: 11 MG/DL (ref 7–20)
CALCIUM SERPL-MCNC: 8.7 MG/DL (ref 8.3–10.6)
CHLORIDE BLD-SCNC: 96 MMOL/L (ref 99–110)
CO2: 26 MMOL/L (ref 21–32)
CREAT SERPL-MCNC: 0.9 MG/DL (ref 0.9–1.3)
CULTURE, BLOOD 2: NORMAL
GFR AFRICAN AMERICAN: >60
GFR NON-AFRICAN AMERICAN: >60
GLUCOSE BLD-MCNC: 113 MG/DL (ref 70–99)
HCT VFR BLD CALC: 37.2 % (ref 40.5–52.5)
HEMOGLOBIN: 12.6 G/DL (ref 13.5–17.5)
MCH RBC QN AUTO: 29.9 PG (ref 26–34)
MCHC RBC AUTO-ENTMCNC: 34 G/DL (ref 31–36)
MCV RBC AUTO: 88 FL (ref 80–100)
PDW BLD-RTO: 14.5 % (ref 12.4–15.4)
PHOSPHORUS: 5 MG/DL (ref 2.5–4.9)
PLATELET # BLD: 553 K/UL (ref 135–450)
PMV BLD AUTO: 6.6 FL (ref 5–10.5)
POTASSIUM SERPL-SCNC: 4.8 MMOL/L (ref 3.5–5.1)
RBC # BLD: 4.23 M/UL (ref 4.2–5.9)
SODIUM BLD-SCNC: 134 MMOL/L (ref 136–145)
TOTAL PROTEIN: 6.8 G/DL (ref 6.4–8.2)
WBC # BLD: 33.5 K/UL (ref 4–11)

## 2021-08-21 PROCEDURE — 2580000003 HC RX 258: Performed by: INTERNAL MEDICINE

## 2021-08-21 PROCEDURE — 6360000002 HC RX W HCPCS: Performed by: INTERNAL MEDICINE

## 2021-08-21 PROCEDURE — 99232 SBSQ HOSP IP/OBS MODERATE 35: CPT | Performed by: INTERNAL MEDICINE

## 2021-08-21 PROCEDURE — 71046 X-RAY EXAM CHEST 2 VIEWS: CPT

## 2021-08-21 PROCEDURE — 6370000000 HC RX 637 (ALT 250 FOR IP): Performed by: INTERNAL MEDICINE

## 2021-08-21 PROCEDURE — 36592 COLLECT BLOOD FROM PICC: CPT

## 2021-08-21 PROCEDURE — 85027 COMPLETE CBC AUTOMATED: CPT

## 2021-08-21 PROCEDURE — 36415 COLL VENOUS BLD VENIPUNCTURE: CPT

## 2021-08-21 PROCEDURE — 80069 RENAL FUNCTION PANEL: CPT

## 2021-08-21 PROCEDURE — 2060000000 HC ICU INTERMEDIATE R&B

## 2021-08-21 PROCEDURE — 32562 LYSE CHEST FIBRIN SUBQ DAY: CPT | Performed by: INTERNAL MEDICINE

## 2021-08-21 PROCEDURE — 87040 BLOOD CULTURE FOR BACTERIA: CPT

## 2021-08-21 PROCEDURE — 80076 HEPATIC FUNCTION PANEL: CPT

## 2021-08-21 RX ORDER — LINEZOLID 600 MG/1
600 TABLET, FILM COATED ORAL EVERY 12 HOURS SCHEDULED
Status: DISCONTINUED | OUTPATIENT
Start: 2021-08-21 | End: 2021-08-26

## 2021-08-21 RX ADMIN — OXYCODONE HYDROCHLORIDE AND ACETAMINOPHEN 1 TABLET: 5; 325 TABLET ORAL at 16:20

## 2021-08-21 RX ADMIN — PIPERACILLIN AND TAZOBACTAM 3375 MG: 3; .375 INJECTION, POWDER, LYOPHILIZED, FOR SOLUTION INTRAVENOUS at 19:12

## 2021-08-21 RX ADMIN — Medication 10 ML: at 05:25

## 2021-08-21 RX ADMIN — METOPROLOL TARTRATE 25 MG: 25 TABLET, FILM COATED ORAL at 09:13

## 2021-08-21 RX ADMIN — LINEZOLID 600 MG: 600 TABLET, FILM COATED ORAL at 20:24

## 2021-08-21 RX ADMIN — DEXTROSE MONOHYDRATE 4 MILLION UNITS: 5 INJECTION INTRAVENOUS at 05:34

## 2021-08-21 RX ADMIN — HYDROMORPHONE HYDROCHLORIDE 1 MG: 2 INJECTION, SOLUTION INTRAMUSCULAR; INTRAVENOUS; SUBCUTANEOUS at 19:11

## 2021-08-21 RX ADMIN — HYDROMORPHONE HYDROCHLORIDE 1 MG: 2 INJECTION, SOLUTION INTRAMUSCULAR; INTRAVENOUS; SUBCUTANEOUS at 14:05

## 2021-08-21 RX ADMIN — KETOROLAC TROMETHAMINE 30 MG: 30 INJECTION, SOLUTION INTRAMUSCULAR; INTRAVENOUS at 13:58

## 2021-08-21 RX ADMIN — ENOXAPARIN SODIUM 40 MG: 40 INJECTION SUBCUTANEOUS at 09:14

## 2021-08-21 RX ADMIN — SODIUM CHLORIDE, PRESERVATIVE FREE 10 ML: 5 INJECTION INTRAVENOUS at 09:14

## 2021-08-21 RX ADMIN — DOCUSATE SODIUM 100 MG: 100 CAPSULE, LIQUID FILLED ORAL at 09:13

## 2021-08-21 RX ADMIN — Medication 10 ML: at 05:29

## 2021-08-21 RX ADMIN — DEXTROSE MONOHYDRATE 4 MILLION UNITS: 5 INJECTION INTRAVENOUS at 01:40

## 2021-08-21 RX ADMIN — HYDROMORPHONE HYDROCHLORIDE 1 MG: 2 INJECTION, SOLUTION INTRAMUSCULAR; INTRAVENOUS; SUBCUTANEOUS at 11:29

## 2021-08-21 RX ADMIN — KETOROLAC TROMETHAMINE 30 MG: 30 INJECTION, SOLUTION INTRAMUSCULAR; INTRAVENOUS at 05:29

## 2021-08-21 RX ADMIN — PIPERACILLIN AND TAZOBACTAM 3375 MG: 3; .375 INJECTION, POWDER, LYOPHILIZED, FOR SOLUTION INTRAVENOUS at 09:12

## 2021-08-21 RX ADMIN — OXYCODONE HYDROCHLORIDE AND ACETAMINOPHEN 1 TABLET: 5; 325 TABLET ORAL at 09:13

## 2021-08-21 RX ADMIN — OXYCODONE HYDROCHLORIDE AND ACETAMINOPHEN 1 TABLET: 5; 325 TABLET ORAL at 21:17

## 2021-08-21 RX ADMIN — KETOROLAC TROMETHAMINE 30 MG: 30 INJECTION, SOLUTION INTRAMUSCULAR; INTRAVENOUS at 20:08

## 2021-08-21 RX ADMIN — ATORVASTATIN CALCIUM 40 MG: 10 TABLET, FILM COATED ORAL at 20:24

## 2021-08-21 RX ADMIN — LINEZOLID 600 MG: 600 TABLET, FILM COATED ORAL at 09:13

## 2021-08-21 RX ADMIN — ASPIRIN 81 MG: 81 TABLET, COATED ORAL at 09:13

## 2021-08-21 RX ADMIN — SODIUM CHLORIDE, PRESERVATIVE FREE 10 ML: 5 INJECTION INTRAVENOUS at 20:08

## 2021-08-21 RX ADMIN — METOPROLOL TARTRATE 25 MG: 25 TABLET, FILM COATED ORAL at 20:24

## 2021-08-21 ASSESSMENT — PAIN SCALES - GENERAL
PAINLEVEL_OUTOF10: 8
PAINLEVEL_OUTOF10: 5
PAINLEVEL_OUTOF10: 8
PAINLEVEL_OUTOF10: 6
PAINLEVEL_OUTOF10: 10
PAINLEVEL_OUTOF10: 4
PAINLEVEL_OUTOF10: 7
PAINLEVEL_OUTOF10: 7
PAINLEVEL_OUTOF10: 6
PAINLEVEL_OUTOF10: 10
PAINLEVEL_OUTOF10: 6
PAINLEVEL_OUTOF10: 4

## 2021-08-21 ASSESSMENT — PAIN DESCRIPTION - ORIENTATION
ORIENTATION: LEFT

## 2021-08-21 ASSESSMENT — PAIN DESCRIPTION - LOCATION
LOCATION: CHEST

## 2021-08-21 ASSESSMENT — PAIN DESCRIPTION - PAIN TYPE
TYPE: ACUTE PAIN
TYPE: ACUTE PAIN;SURGICAL PAIN

## 2021-08-21 ASSESSMENT — PAIN DESCRIPTION - DESCRIPTORS
DESCRIPTORS: ACHING;DISCOMFORT
DESCRIPTORS: ACHING;CONSTANT;SHARP;SORE
DESCRIPTORS: SHARP

## 2021-08-21 ASSESSMENT — PAIN DESCRIPTION - FREQUENCY
FREQUENCY: CONTINUOUS
FREQUENCY: CONTINUOUS

## 2021-08-21 ASSESSMENT — PAIN DESCRIPTION - ONSET: ONSET: ON-GOING

## 2021-08-21 NOTE — FLOWSHEET NOTE
08/20/21 2137   Assessment   Charting Type Shift assessment   Neurological   Level of Consciousness Alert (0)   Orientation Level Oriented X4   Cognition Appropriate judgement; Appropriate safety awareness; Appropriate attention/concentration; Appropriate for developmental age   [de-identified] Coma Scale   Eye Opening 4   Best Verbal Response 5   Best Motor Response 6   [place] Coma Scale Score 15   HEENT   HEENT (WDL) WDL   Respiratory   Respiratory Pattern Tachypneic   Respiratory Depth Shallow   Respiratory Quality/Effort Dyspnea with exertion;Dyspnea at rest   Chest Assessment Chest expansion symmetrical   Breath Sounds   Right Upper Lobe Diminished   Right Middle Lobe Diminished   Right Lower Lobe Diminished   Left Upper Lobe Diminished   Left Lower Lobe Diminished   Cardiac   Cardiac Regularity Regular   Heart Sounds S1, S2   Cardiac Rhythm Sinus tachy   Rhythm Interpretation   Pulse 124   Cardiac Monitor   Telemetry Monitor On Yes   Telemetry Audible Yes   Telemetry Alarms Set Yes   Telemetry Box Number [id number]   Gastrointestinal   Abdominal (WDL) WDL   Peripheral Vascular   Peripheral Vascular (WDL) WDL   Edema None   Skin Color/Condition   Skin Color/Condition (WDL) WDL   Skin Integrity   Skin Integrity Other (Comment)  (Dressing CDI, CT site)   Location Left side   Musculoskeletal   RUE Full movement   LUE Limited movement  (CT site)   RL Extremity Full movement   LL Extremity Full movement   Genitourinary   Genitourinary (WDL) WDL   Flank Tenderness No   Suprapubic Tenderness No   Dysuria No   Anus/Rectum   Anus/Rectum (WDL) WDL   Chest Tube 1 Left Pleural 10 [place]   Placement Date/Time: 08/19/21 1252   Timeout: Patient;Procedure;Site/Side;Consent Confirmed; Appropriate Equipment;Sterile Technique using full body drape;Site prepped with chlorhexidine;Sterile drsg with biopatch; Availability of Implant; Correct Positi. ..    Suction -20 cm H2O   Drainage Description Bright red;Serosanguinous   Dressing Status Clean;Dry; Intact   Dressing Type Dry dressing   Dressing Change Due 08/21/21   Site Assessment Clean;Dry; Intact   Surrounding Skin Dry   Psychosocial   Patient Behaviors Calm; Cooperative   Pt resting quietly in bed, shift assessment done, updated with POC, verbalized understanding. No concerns voiced at this time. Safety/fall prevention maintained. Personal belongings and call light within reach. Will continue to monitor.  DANNY

## 2021-08-21 NOTE — PROGRESS NOTES
Hospitalist Progress Note      PCP: Gloria Edgar MD    Date of Admission: 8/15/2021    Chief Complaint: positive blood cultures    Hospital Course:   30 y.o. male who presented to [facility] with above complaints  Patient was evaluated in the ED on 8/14 for generalized fatigue, fever, sore throat, shortness of breath and multiple other complaints. He had gone to urgent care and had a rapid Covid test and was told it was negative. He has been having difficulty eating and drinking because the throat is very sore. So he came into the ED to get evaluated. His rapid strep test was positive. Patient was given IM penicillin after blood cultures were collected. He was prescribed tramadol and discharged home. Patient's blood cultures came back positive and was asked to come to the ED today. Patient reports he continues to have intermittent fevers, sore throat. Patient also reports has been having intermittent shortness of breath, no cough. Subjective: Chest pain improved but still appears septic. Chest tube still with high output.        Medications:  Reviewed    Infusion Medications    sodium chloride      sodium chloride Stopped (08/20/21 1207)     Scheduled Medications    piperacillin-tazobactam  3,375 mg Intravenous Q8H    linezolid  600 mg Oral 2 times per day    alteplase (ACTIVASE) syringe  10 mg Intrapleural Once    And    dornase (PULMOZYME) syringe  5 mg Intrapleural Once    metoprolol tartrate  25 mg Oral BID    enoxaparin  40 mg Subcutaneous Daily    docusate sodium  100 mg Oral Daily    atorvastatin  40 mg Oral Nightly    aspirin EC  81 mg Oral Daily    sodium chloride flush  5-40 mL Intravenous 2 times per day     PRN Meds: HYDROmorphone, hydrALAZINE, perflutren lipid microspheres, oxyCODONE-acetaminophen, ketorolac, sodium chloride flush, sodium chloride, ondansetron **OR** ondansetron, polyethylene glycol, acetaminophen **OR** acetaminophen, benzocaine-menthol, perflutren lipid microspheres      Intake/Output Summary (Last 24 hours) at 8/21/2021 1130  Last data filed at 8/21/2021 0913  Gross per 24 hour   Intake 430 ml   Output 2580 ml   Net -2150 ml       Physical Exam Performed:    BP (!) 137/90   Pulse 114   Temp 98.6 °F (37 °C) (Axillary)   Resp 14   Ht 6' (1.829 m)   Wt 174 lb 9.7 oz (79.2 kg)   SpO2 92%   BMI 23.68 kg/m²     General appearance: No apparent distress, appears stated age and cooperative. HEENT: Pupils equal, round, and reactive to light. Conjunctivae/corneas clear. Neck: Supple, with full range of motion. No jugular venous distention. Trachea midline. Respiratory:  Normal respiratory effort. Decreased breath sounds on left without Rales/Wheezes/Rhonchi. Chest tube in place  Cardiovascular: tachycardia, regular rhythm with normal S1/S2 without murmurs, rubs or gallops. Abdomen: Soft, non-tender, non-distended with normal bowel sounds. Musculoskeletal: No clubbing, cyanosis or edema bilaterally. Full range of motion without deformity. Skin: Skin color, texture, turgor normal.  No rashes or lesions. Neurologic:  Neurovascularly intact without any focal sensory/motor deficits.  Cranial nerves: II-XII intact, grossly non-focal.  Psychiatric: Alert and oriented, thought content appropriate, normal insight  Capillary Refill: Brisk,3 seconds, normal   Peripheral Pulses: +2 palpable, equal bilaterally       Labs:   Recent Labs     08/19/21  0540 08/20/21 0336 08/21/21  0522   WBC 16.4* 18.2* 33.5*   HGB 11.5* 12.3* 12.6*   HCT 34.1* 35.6* 37.2*    435 553*     Recent Labs     08/19/21  0540 08/20/21  0336 08/21/21  0522    134* 134*   K 4.2 4.1 4.8    97* 96*   CO2 26 25 26   BUN 5* 8 11   CREATININE 0.8* 0.9 0.9   CALCIUM 8.2* 9.0 8.7   PHOS  --  3.8 5.0*     Recent Labs     08/19/21  0540 08/20/21  0336 08/21/21  0522   AST 37 30 24   ALT 62* 55* 38   BILIDIR  --   --  0.6*   BILITOT 0.9 0.9 1.2*   ALKPHOS 176* 222* 175*     Recent Labs 08/18/21  1304   INR 1.22*     No results for input(s): CKTOTAL, TROPONINI in the last 72 hours. Urinalysis:      Lab Results   Component Value Date    NITRU Negative 08/14/2021    WBCUA 6-9 08/14/2021    BACTERIA 3+ 08/14/2021    RBCUA 11-20 08/14/2021    BLOODU MODERATE 08/14/2021    SPECGRAV 1.025 08/14/2021    GLUCOSEU Negative 08/14/2021       Radiology:  XR CHEST (2 VW)   Final Result   Moderate left empyema, minimally decreased from prior. XR CHEST PORTABLE   Final Result   Moderate left pleural effusion, increased as compared to prior. Bibasilar atelectasis or airspace disease. CT GUIDED PLEURAL DRAINAGE W [facility]   Preliminary Result   Successful CT-guided chest tube placement. IR PICC WO SQ PORT/PUMP > 5 YEARS   Final Result   Successful placement of PICC line. CT CHEST ABDOMEN PELVIS W CONTRAST   Final Result   1. Significant worsening of left lower lobe pneumonia with developing   loculated parapneumonic effusion. 2. Several rapidly enlarging right-sided subpleural pulmonary nodules. Septic emboli are a diagnostic consideration. 3. No acute abdominopelvic abnormality. XR CHEST (2 VW)   Final Result   Increased bibasilar airspace disease with small left-sided pleural effusion         CT SOFT TISSUE NECK WO CONTRAST   Final Result   No acute abnormality of the soft tissue structures of the neck. CT CHEST PULMONARY EMBOLISM W CONTRAST   Final Result   1. Multifocal airspace disease, including scattered nodular airspace disease. Correlate with presentation. Follow-up to resolution recommended. 2. Suboptimal opacification of the pulmonary arteries. No acute pulmonary   embolism to the lobar arteries given limitation. 3. Other findings as described.          XR CHEST (2 VW)    (Results Pending)           Assessment/Plan:    Active Hospital Problems    Diagnosis     Loculated pleural effusion [J90]     Septic embolism (HCC) [facility]in Call     Atelectasis of left lung [J98.11]     Abnormal LFTs [R94.5]     Former smoker [Z87.891]     Marijuana smoker [F12.90]     Pharyngitis due to Streptococcus species [J02.0]     S/P patent foramen ovale closure [Z87.74]     Streptococcal bacteremia [R78.81, B95.5]     Cerebrovascular accident (CVA) ([facility] [address] [I63.9]      Sepsis 2/2 strep bacteremia, empyema  - ID consulted  - continues to appear septic  - penicillin G changed to zyvox, zosyn  - no clear indication of vegetations on GARDENIA. Will need repeat GARDENIA once abx course completed  - pulm consulted  - continues to have high output from chest tube  - continue tPA dwells  - no improvement on CXR  - CT surgery consulted  - possible VATS next week    Elevated LFTs  - likely septic  - improving with supportive care    H/O CVA  - s/p PFO closure  - continue ASA, statin    HTN  - well controlled  - continue metoprolol    HLD  - continue statin    DVT Prophylaxis: lovenox  Diet: ADULT DIET;  Regular  Adult Oral Nutrition Supplement; Standard High Calorie/High Protein Oral Supplement  Code Status: Full Code    PT/OT Eval Status: not ordered    Dispo - at least 3-5 days given ongoing sepsis    Kurtis Mack MD

## 2021-08-21 NOTE — PROGRESS NOTES
INPATIENT PULMONARY CRITICAL CARE PROGRESS NOTE      Reason for visit: Group B streptococcal bacteremia, strep throat, left loculated effusion/empyema, findings suggestive of septic emboli prior PFO closure. SUBJECTIVE: The patient remains hemodynamically stable, has had low-grade fever, did spike to 102 °F 3 days ago. He has low normal oxygen saturation on room air. His chest pain is slightly decreased, he feels better. Instilled TPA and dornase through chest tube 8/20 with 1800 ml of return. He has a good appetite, denies other complaints. Physical Exam:  Blood pressure 111/80, pulse 101, temperature 98.3 °F (36.8 °C), temperature source Oral, resp. rate 16, height 6' (1.829 m), weight 174 lb 9.7 oz (79.2 kg), SpO2 92 %.'   Constitutional: Pleasant, averagely built, not ill-appearing. In minimal pain due to chest tube  HENT: Oropharynx is clear and moist.  Class II airway, no dental lesions. Eyes:  Conjunctivae are normal.  No scleral icterus. Neck: No JVD. Cardiovascular: Normal rate, regular rhythm, normal heart sounds. Pulmonary/Chest: No wheezes. No rales. Air entry significantly reduced left hemithorax   Abdominal: Deferred. Musculoskeletal: No cyanosis. No clubbing. No obvious joint deformity. Lymphadenopathy: No cervical or supraclavicular adenopathy. Skin: Skin is warm and dry. No rash or nodules on the exposed extremities. Psychiatric: Normal mood and affect. Behavior is normal.  No anxiety. Neurologic: Alert, awake and oriented. PERRL. Speech fluent.   No obvious neurologic deficit, detailed exam not performed      Results:  CBC:   Recent Labs     08/19/21  0540 08/20/21  0336 08/21/21  0522   WBC 16.4* 18.2* 33.5*   HGB 11.5* 12.3* 12.6*   HCT 34.1* 35.6* 37.2*   MCV 88.7 87.2 88.0    435 553*     BMP:   Recent Labs     08/19/21  0540 08/20/21  0336 08/21/21  0522    134* 134*   K 4.2 4.1 4.8    97* 96*   CO2 26 25 26   PHOS  --  3.8 5.0*   BUN 5* 8 11 CREATININE 0.8* 0.9 0.9     LIVER PROFILE:   Recent Labs     08/19/21  0540 08/20/21  0336 08/21/21  0522   AST 37 30 24   ALT 62* 55* 38   BILIDIR  --   --  0.6*   BILITOT 0.9 0.9 1.2*   ALKPHOS 176* 222* 175*     PT/INR:   No results for input(s): PROTIME, INR in the last 72 hours. Imaging:  I have reviewed radiology images personally. XR CHEST (2 VW)   Final Result   Moderate left empyema, minimally decreased from prior. XR CHEST PORTABLE   Final Result   Moderate left pleural effusion, increased as compared to prior. Bibasilar atelectasis or airspace disease. CT GUIDED PLEURAL DRAINAGE W [facility]   Preliminary Result   Successful CT-guided chest tube placement. IR PICC WO SQ PORT/PUMP > 5 YEARS   Final Result   Successful placement of PICC line. CT CHEST ABDOMEN PELVIS W CONTRAST   Final Result   1. Significant worsening of left lower lobe pneumonia with developing   loculated parapneumonic effusion. 2. Several rapidly enlarging right-sided subpleural pulmonary nodules. Septic emboli are a diagnostic consideration. 3. No acute abdominopelvic abnormality. XR CHEST (2 VW)   Final Result   Increased bibasilar airspace disease with small left-sided pleural effusion         CT SOFT TISSUE NECK WO CONTRAST   Final Result   No acute abnormality of the soft tissue structures of the neck. CT CHEST PULMONARY EMBOLISM W CONTRAST   Final Result   1. Multifocal airspace disease, including scattered nodular airspace disease. Correlate with presentation. Follow-up to resolution recommended. 2. Suboptimal opacification of the pulmonary arteries. No acute pulmonary   embolism to the lobar arteries given limitation. 3. Other findings as described.          XR CHEST (2 VW)    (Results Pending)     Echo Complete    Result Date: 8/18/2021  Transthoracic Echocardiography Report (TTE)  Demographics   Patient Name       44 [facility]   Date of Study 08/18/2021         Gender              Male   Patient Number     [id number]         Date of Birth       1985   Visit Number       [id number]          Age                 30 year(s)   Accession Number   [id number]         Room Number         0522   Corporate ID       [id number]           Sonographer         Shana Garcia [facility]   Ordering Physician Akila Doe MD       Physician           Mariel Briscoe MD, [facility] [place]  Procedure Type of Study   TTE procedure:ECHOCARDIOGRAM COMPLETE 2D W DOPPLER W COLOR. Procedure Date Date: 08/18/2021 Start: 08:19 AM Study Location: [address] [place] [address] - Echo Lab Technical Quality: Adequate visualization Additional Indications:Bacteremia. Patient Status: Routine Height: 72 inches Weight: 178 pounds BSA: 2.03 m2 BMI: 24.14 kg/m2 BP: 138/90 mmHg  Conclusions   Summary  Normal LV systolic function with a visually estimated ejection fraction of  55-60%. No regional wall motion abnormalities are seen. EF calculated by 3D at 59%. A 25 mm Amplatzer closure device is visualized and appears well seated. No  obvious shunt from color flow. Normal left ventricular diastolic filling pressure. Mild eccentric tricuspid regurgitation. Systolic pulmonary artery pressure (SPAP) is elevated and estimated at 60  mmHg (RAP 15 mmHg) c/w mod-severe pulmonary hypertension. No obvious masses, thrombi, or vegetations are noted. Signature   ------------------------------------------------------------------  Electronically signed by Mariel Briscoe MD, [facility] [place]  (Interpreting physician) on 08/18/2021 at 10:17 AM  ------------------------------------------------------------------   Findings   Left Ventricle  Normal left ventricle size, wall thickness, and systolic function with a  visually estimated ejection fraction of 55-60%. EF calculated by 3D at 59%.   No regional wall motion abnormalities are seen. Normal left ventricular diastolic filling pressure. Mitral Valve  The mitral valve is mildly calcified with no restriction of motion. Trace mitral regurgitation. Left Atrium  The left atrium is at the upper limits of normal in size. Aortic Valve  The aortic valve appears normal in structure and function. There is no evidence of aortic regurgitation. Aorta  The aortic root is normal in size. Right Ventricle  The right ventricle is normal in size and function. TAPSE is measured at 27 mm. S velocity is measured at 17.5 cm/s. Tricuspid Valve  The tricuspid valve is normal in structure. Mild eccentric tricuspid regurgitation. Systolic pulmonary artery pressure (SPAP) is elevated and estimated at 60  mmHg (right atrial pressure 15 mmHg) consistent with moderate to severe  pulmonary hypertension. Right Atrium  The right atrium is normal in size. Right atrial area is 19 cm2. Right atrial volume is 22.7 ml/m2. Pulmonic Valve  The pulmonic valve is normal in structure and function. No evidence of pulmonic regurgitation. Pericardial Effusion  No pericardial effusion noted. Pleural Effusion  No pleural effusion noted. Miscellaneous  A 25 mm Amplatzer closure device is visualized and appears well seated. No  obvious shunt from color flow. The IVC is dilated in size (>2.1 cm) and collapses <50% with respiration  consistent with markedly elevated right atrial pressures (15 mmHg) . No obvious masses, thrombi, or vegetations are noted.   M-Mode/2D Measurements (cm)   LV Diastolic Dimension: 1.69 cm LV Systolic Dimension: 8.29 cm  LV Septum Diastolic: 4.89 cm  LV PW Diastolic: 0.9 cm         AO Root Dimension: 3.5 cm                                  LA Dimension: 3.7 cm                                  LA Area: 22 cm2  LVOT: 2 cm                      LA volume/Index: 68.85 ml /34 ml/m2  Doppler Measurements   AV Peak Velocity: 136 cm/s     MV Peak E-Wave: 87.4 cm/s AV Peak Gradient: 7.4 mmHg     MV Peak A-Wave: 70.3 cm/s  LVOT Peak Velocity: 124 cm/s   MV E/A Ratio: 1.24   TR Velocity:337 cm/s  TR Gradient:45.43 mmHg  Estimated RAP:15 mmHg  Estimated RVSP: 60 mmHg  E' Septal Velocity: 11.1 cm/s  E' Lateral Velocity: 16.6 cm/s  E/E' ratio: 6.6   Aortic Valve   Peak Velocity: 136 cm/s  Peak Gradient: 7.4 mmHg  Aorta   Aortic Root: 3.5 cm  LVOT Diameter: 2 cm      XR CHEST (2 VW)    Result Date: 8/17/2021  EXAMINATION: TWO XRAY VIEWS OF THE CHEST 8/17/2021 2:58 pm COMPARISON: 08/14/2020 HISTORY: ORDERING SYSTEM PROVIDED HISTORY: pleuritic pain TECHNOLOGIST PROVIDED HISTORY: Reason for exam:->pleuritic pain Reason for Exam: chest pain Acuity: Acute Type of Exam: Subsequent/Follow-up FINDINGS: Lung volumes are low accentuating heart size and bronchovascular markings at the lung bases. Small left-sided pleural effusion is seen. There is adjacent left basilar consolidation There is hazy right basilar opacity Amplatzer device is seen. Increased bibasilar airspace disease with small left-sided pleural effusion     XR CHEST (2 VW)    Result Date: 8/14/2021  EXAMINATION: TWO XRAY VIEWS OF THE CHEST 8/14/2021 5:46 pm COMPARISON: Chest radiograph performed 10/23/2020. HISTORY: ORDERING SYSTEM PROVIDED HISTORY: SOB TECHNOLOGIST PROVIDED HISTORY: Reason for exam:->SOB Reason for Exam: cp, sob, tachycardia started 2-3 days ago Acuity: Acute Type of Exam: Initial FINDINGS: There is no acute consolidation or effusion. There is no pneumothorax. The mediastinal structures are unremarkable. The upper abdomen is unremarkable. The extrathoracic soft tissues are unremarkable. There is no acute osseous abnormality. No acute cardiopulmonary process. XR LUMBAR SPINE (2-3 VIEWS)    Result Date: 8/14/2021  EXAMINATION: THREE XRAY VIEWS OF THE LUMBAR SPINE 8/14/2021 6:18 pm COMPARISON: None.  HISTORY: ORDERING SYSTEM PROVIDED HISTORY: low back pain TECHNOLOGIST PROVIDED HISTORY: Reason for exam:->low back pain Reason for Exam: low back for a few days; no injury Acuity: Acute Type of Exam: Initial FINDINGS: The vertebral body heights are maintained. The disc spaces are maintained. There is no spondylolisthesis. The visualized bony pelvis is intact. The surrounding soft tissues are unremarkable. No acute vertebral body or disc space abnormality. CT SOFT TISSUE NECK WO CONTRAST    Result Date: 8/16/2021  EXAMINATION: CT OF THE NECK WITHOUT CONTRAST  8/16/2021 TECHNIQUE: CT of the neck was performed without the administration of intravenous contrast. Multiplanar reformatted images are provided for review. Dose modulation, iterative reconstruction, and/or weight based adjustment of the mA/kV was utilized to reduce the radiation dose to as low as reasonably achievable. COMPARISON: None. HISTORY: ORDERING SYSTEM PROVIDED HISTORY: evaluate for suppurative complication of pharyngitis TECHNOLOGIST PROVIDED HISTORY: Reason for exam:->evaluate for suppurative complication of pharyngitis Reason for Exam: sore throat, swollen rt node, difficulty swallowing x 6 days Acuity: Acute Type of Exam: Initial FINDINGS: PHARYNX/LARYNX:  The palatine tonsils are normal in appearance. The tongue is normal in appearance. The valleculae, epiglottis, aryepiglottic folds and pyriform sinuses appear unremarkable. The true and false vocal cords are normal in appearance. No mass or abscess is seen. SALIVARY GLANDS/THYROID:  The parotid and submandibular glands appear unremarkable. The thyroid gland appears unremarkable. LYMPH NODES:  No cervical or supraclavicular lymphadenopathy is seen. SOFT TISSUES:  No appreciable soft tissue swelling or mass is seen. BRAIN/ORBITS/SINUSES:  The visualized portion of the intracranial contents appear unremarkable. The visualized portion of the orbits, paranasal sinuses and mastoid air cells demonstrate no acute abnormality.  LUNG APICES/SUPERIOR MEDIASTINUM:  No focal consolidation is seen within the visualized lung apices. No superior mediastinal lymphadenopathy or mass. The visualized portion of the trachea appears unremarkable. BONES:  No aggressive appearing lytic or blastic bony lesion. No acute abnormality of the soft tissue structures of the neck. CT LUMBAR SPINE W CONTRAST    Result Date: 8/14/2021  EXAMINATION: CT OF THE LUMBAR SPINE WITH CONTRAST  8/14/2021 9:12 pm TECHNIQUE: CT of the lumbar spine was performed with the administration of intravenous contrast. Multiplanar reformatted images are provided for review. Dose modulation, iterative reconstruction, and/or weight based adjustment of the mA/kV was utilized to reduce the radiation dose to as low as reasonably achievable. COMPARISON: None HISTORY: ORDERING SYSTEM PROVIDED HISTORY: low back pain, fever TECHNOLOGIST PROVIDED HISTORY: Reason for exam:->low back pain, fever Decision Support Exception - unselect if not a suspected or confirmed emergency medical condition->Emergency Medical Condition (MA) Reason for Exam: severe b ack pain x few days. more on the left side. fever, sore throath Acuity: Acute Type of Exam: Initial FINDINGS: BONES/ALIGNMENT:  There is normal alignment of the spine. The vertebral body heights are maintained. No osseous destructive lesion is seen. SOFT TISSUES: No acute paraspinal soft tissue abnormality. No abnormal fluid collection or enhancement. Incidental 8 mm left renal cortical cyst appears benign. L1-L2: There is no significant disc protrusion, central spinal canal stenosis or neural foraminal narrowing. L2-L3: There is no significant disc protrusion, central spinal canal stenosis or neural foraminal narrowing. L3-L4: There is no significant disc protrusion, central spinal canal stenosis or neural foraminal narrowing. L4-L5: There is no significant disc protrusion, central spinal canal stenosis or neural foraminal narrowing.  L5-S1: There is no significant disc protrusion, central spinal canal stenosis or neural foraminal narrowing. Unremarkable contrast enhanced CT of the lumbosacral spine. CT CHEST PULMONARY EMBOLISM W CONTRAST    Result Date: 8/15/2021  EXAMINATION: CTA OF THE CHEST 8/15/2021 9:56 pm TECHNIQUE: CTA of the chest was performed after the administration of intravenous contrast.  Multiplanar reformatted images are provided for review. MIP images are provided for review. Dose modulation, iterative reconstruction, and/or weight based adjustment of the mA/kV was utilized to reduce the radiation dose to as low as reasonably achievable. COMPARISON: None. HISTORY: ORDERING SYSTEM PROVIDED HISTORY: pleuritic chest pain , left TECHNOLOGIST PROVIDED HISTORY: Reason for exam:->pleuritic chest pain , left Decision Support Exception - unselect if not a suspected or confirmed emergency medical condition->Emergency Medical Condition (MA) Reason for Exam: left sided back and chest pain Acuity: Acute Type of Exam: Initial FINDINGS: Pulmonary Arteries: Pulmonary arteries are within normal limits in size. . Suboptimal opacification of the pulmonary arteries. No filling defect is identified in the pulmonary arteries to the level of thelobar arteries. Mediastinum: Heart is borderline enlarged. No pericardial effusion. Motion degradation precludes evaluation of the aortic root and ascending aorta. The descending thoracic aorta is within normal limits in size. No luminal defect is appreciated in the descending thoracic aorta. Inter atrial closure device noted. Lungs/pleura: Central airways are patent. Ground-glass the confluent airspace disease noted. Scattered nodular airspace disease noted. No pleural effusion. No pneumothorax. Soft Tissues/Bones: No adenopathy. Calcified lymph nodes in left hilum. Upper Abdomen: Limited images of the upper abdomen are unremarkable. 1. Multifocal airspace disease, including scattered nodular airspace disease. Correlate with presentation.   Follow-up to resolution recommended. 2. Suboptimal opacification of the pulmonary arteries. No acute pulmonary embolism to the lobar arteries given limitation. 3. Other findings as described. IR PICC WO SQ PORT/PUMP > 5 YEARS    Result Date: 8/18/2021  EXAMINATION: LIMITED ULTRASOUND OF THE ARM FOR PICC ACCESS, 8/18/2021 TECHNIQUE: The PICC team used ultrasound guidance to place a PICC line. COMPARISON: 08/17/2021. HISTORY: ORDERING SYSTEM PROVIDED HISTORY: iv abx TECHNOLOGIST PROVIDED HISTORY: Reason for exam:->iv abx How many lumens are being requested?->2 What site is the preferred site? ->No preference What side should this line be placed? ->Either FLUOROSCOPY DOSE AND TYPE OR TIME AND EXPOSURES: None. FINDINGS: Ultrasound images demonstrate patency of the left basilic vein which was used for access for placement of a PICC by the PICC team, without a radiologist present. Left basilic vein measures 8.69 cm in transverse diameter in the region of the access site. AP view of the chest was obtained following placement of the left upper extremity PICC line which demonstrates distal tip of the PICC line in the region of junction of superior vena cava and right atrium. There is faint visualization of device overlying the rightward aspect of the cardiopericardial silhouette related to PFO closure device. There is increasing left basilar pleuroparenchymal disease representing combination of pleural fluid and parenchymal disease/atelectasis. There is persistent right basilar parenchymal disease/atelectasis which is generally similar in appearance when compared to the study of 08/17/2021. Faint fibronodular densities again identified in the lateral aspect of the right mid lung zone. By report, a 46 cm 5.5 English dual lumen PICC line was placed. Successful placement of PICC line.      CT CHEST ABDOMEN PELVIS W CONTRAST    Result Date: 8/18/2021  EXAMINATION: CT OF THE CHEST, ABDOMEN, AND PELVIS WITH CONTRAST 8/18/2021 11:04 am TECHNIQUE: CT of the chest, abdomen and pelvis was performed with the administration of intravenous contrast. Multiplanar reformatted images are provided for review. Dose modulation, iterative reconstruction, and/or weight based adjustment of the mA/kV was utilized to reduce the radiation dose to as low as reasonably achievable. COMPARISON: 08/15/2021 HISTORY: ORDERING SYSTEM PROVIDED HISTORY: elevated LFTs, pna w effusion TECHNOLOGIST PROVIDED HISTORY: Reason for exam:->elevated LFTs, pna w effusion Additional Contrast?->Oral FINDINGS: Chest: Mediastinum: The central airways are patent. There is no hilar or mediastinal adenopathy. Lungs/pleura: Dense consolidation has significantly progressed throughout the left lower lobe with central air bronchograms. A small to moderate-sized multiloculated left pleural effusion is now noted. There is patchy consolidation within the right middle lobe and right posterior costophrenic angle. Several small noncalcified right-sided pleural based soft tissue nodules are have doubled in size over the interval with the largest now measuring 11 mm. Soft Tissues/Bones: There is no acute fracture or aggressive osseous lesion. Abdomen/Pelvis: Organs: The liver, pancreas, spleen, kidneys and adrenals are unremarkable. GI/Bowel: There is no bowel dilatation, wall thickening or obstruction. Pelvis: The bladder and prostate are unremarkable. Peritoneum/Retroperitoneum: There is no free air, free fluid or intraperitoneal inflammatory change. There is no adenopathy. Bones/Soft Tissues: There is no acute fracture or aggressive osseous lesion. 1. Significant worsening of left lower lobe pneumonia with developing loculated parapneumonic effusion. 2. Several rapidly enlarging right-sided subpleural pulmonary nodules. Septic emboli are a diagnostic consideration. 3. No acute abdominopelvic abnormality.      CT GUIDED PLEURAL DRAINAGE W CATH PERC    Result Date: 8/19/2021  PROCEDURE: CT PERC CATH PLEURAL DRAIN W IMAGIN MODERATE CONSCIOUS SEDATION 8/19/2021 HISTORY: ORDERING SYSTEM PROVIDED HISTORY: Loculated left effusion, picture of septic emboli TECHNOLOGIST PROVIDED HISTORY: Reason for exam:->Loculated left effusion, picture of septic emboli Which side should the procedure be performed? ->Left Reason for Exam: left lung drainage and tube placement Relevant Medical/Surgical History: no fluoro used TECHNIQUE: Dose modulation, iterative reconstruction, and/or weight based adjustment of the mA/kV was utilized to reduce the radiation dose to as low as reasonably achievable. CONTRAST: None. SEDATION: Moderate conscious sedation with versed and fentanyl, was intravenously administered by a nurse under controlled circumstances. Duration of sedation was approximately 15 minutes. ESTIMATED BLOOD LOSS: Minimal. FLUOROSCOPY DOSE AND TYPE OR TIME AND EXPOSURES: None. DESCRIPTION OF PROCEDURE: Informed consent was obtained after a detailed explanation of the procedure including risks, benefits, and alternatives. Universal protocol was observed. Initial CT evaluation demonstrated a moderate to large left-sided pleural effusion. An appropriate skin entry site was chosen. The skin was sterilely prepped and draped. Local anesthesia achieved with 1% lidocaine. A 10 Green[place]ic locking pigtail drainage catheter was advanced into the inferior portion of the pleural effusion using Trocar technique. The pigtail was formed and locked. There was rapid return of cloudy yellow fluid. The catheter was secured to the skin and placed to Pleur-vac drainage. Samples were sent for culture and sensitivity. Successful CT-guided chest tube placement. Assessment and plan:  Left empyema. CT-guided chest tube placed by IR, small amount of fluid. Fluid chemistry compatible with complicated parapneumonic effusion/empyema, more likely the latter. Dornase and TPA placed 8/20, repeated today.  Has been evaluated by cardiothoracic surgery, awaiting response to fibrinolytic. CXR improved but still large collection of fluid  ? Septic emboli. Classic appearance on CT, source unclear. Possibly associated with bacteremia/infective endocarditis. TTE and [place] suggested a small globular echodensity suggestive of vegetation on the left atrial side of the occluder. There was no shunt except for delayed intrapulmonary shunt. Per cardiology and ID  Group B streptococcus bacteremia. On high-dose penicillin G, Dr. Dayanara Chu following. Leukocytosis. Mild. Anemia. Normochromic, normocytic. Mild. ?  Microscopic hematuria. The patient denies any symptoms. Can be seen with endocarditis   CVA, ASD closure. Had no shunt on echocardiogram in May 2021. Abnormal LFT. Unclear etiology  DVT prophylaxis. Hold Lovenox until chest tube completed    Discussed with patient, his family, Dr. Shahnaz Jimenez and nursing. CT chest after drainage stops. Electronically signed by:  Mj Reddy MD    8/21/2021    3:43 PM.     Addendum: TPN dornase placed into left pleural space, chest tube clamped. On unclamping, there was significant fluid return with pain. I have explained to the patient the pain will increase with increased drainage.

## 2021-08-21 NOTE — PROGRESS NOTES
Infectious Disease Follow up Notes    CC :  Streptococcal bacteremia      Antibiotics:   Zosyn 3.375 q8 - s 8/21/21  linezolid 600 po BID - s 8/21/21    Admit Date:   8/15/2021  Hospital Day: 7    Subjective:   Fever 102.8 last evening  AF this morning  1880 recorded output from the CT yesterday  He is more comfortable today  No new concerns voiced     Objective:     Patient Vitals for the past 8 hrs:   BP Temp Temp src Pulse Resp SpO2 Weight   08/21/21 0859 (!) 137/90 98.6 °F (37 °C) Axillary 114 14 92 %    08/21/21 0558       174 lb 9.7 oz (79.2 kg)   08/21/21 0514 126/86 98.6 °F (37 °C) Oral 115 20 91 %        EXAM:  General:  A&O, NAD     HEENT:  NCAT, PERRL, sclera anicteric   NECK:   Supple, symmetric   LUNGS:   Non-labored breathing. Decreased breath sounds posteriorly on L.  R clear.    CV:   RRR without murmur  ABD: soft, flat, NT   EXT:  No focal rash, no LE edema          LINE: PICC LUE in place, site ok          Scheduled Meds:   piperacillin-tazobactam  3,375 mg Intravenous Q8H    linezolid  600 mg Oral 2 times per day    alteplase (ACTIVASE) syringe  10 mg Intrapleural Once    And    dornase (PULMOZYME) syringe  5 mg Intrapleural Once    metoprolol tartrate  25 mg Oral BID    enoxaparin  40 mg Subcutaneous Daily    docusate sodium  100 mg Oral Daily    atorvastatin  40 mg Oral Nightly    aspirin EC  81 mg Oral Daily    sodium chloride flush  5-40 mL Intravenous 2 times per day       Continuous Infusions:   sodium chloride      sodium chloride Stopped (08/20/21 1207)          Data Review:    Lab Results   Component Value Date    WBC 33.5 (H) 08/21/2021    HGB 12.6 (L) 08/21/2021    HCT 37.2 (L) 08/21/2021    MCV 88.0 08/21/2021     (H) 08/21/2021     Lab Results   Component Value Date    CREATININE 0.9 08/21/2021    BUN 11 08/21/2021     (L) 08/21/2021    K 4.8 08/21/2021    CL 96 (L) 08/21/2021    CO2 26 08/21/2021       Hepatic Function Panel:   Lab Results   Component Value Date    ALKPHOS 175 08/21/2021    ALT 38 08/21/2021    AST 24 08/21/2021    PROT 6.8 08/21/2021    PROT 7.0 03/07/2010    BILITOT 1.2 08/21/2021    BILIDIR 0.6 08/21/2021    IBILI 0.6 08/21/2021    LABALBU 2.8 08/21/2021         MICRO:  8/14     BC #1  S pyogenes              BC #2 neg              Throat GABHS screen +               COVID NAAT neg   8/16     COVID NAAT neg                     COVID PCR neg   8/17 BC x2 NGTD   8/19 Pleural fluid cultures NGTD, GS 3+wbc,no organisms seen        IMAGING:  CT chest 8/15/21  Impression   1. Multifocal airspace disease, including scattered nodular airspace disease. Correlate with presentation.  Follow-up to resolution recommended. 2. Suboptimal opacification of the pulmonary arteries.  No acute pulmonary   embolism to the lobar arteries given limitation. 3. Other findings as described.      CT neck 8/16/21 negative     CT L spine negative    CT CAP 8/18/21  Impression   1. Significant worsening of left lower lobe pneumonia with developing   loculated parapneumonic effusion. 2. Several rapidly enlarging right-sided subpleural pulmonary nodules. Septic emboli are a diagnostic consideration. 3. No acute abdominopelvic abnormality.        Assessment:     Patient Active Problem List    Diagnosis Date Noted    Loculated pleural effusion     Septic embolism (HCC)     Atelectasis of left lung     Abnormal LFTs     Former smoker     Marijuana smoker     Pharyngitis due to Streptococcus species 08/16/2021    S/P patent foramen ovale closure 08/16/2021    Streptococcal bacteremia 08/15/2021    Sebaceous cyst     Closed displaced fracture of base of fourth metacarpal bone of right hand 06/27/2019    Closed displaced fracture of body of hamate of right wrist 06/27/2019    PFO (patent foramen ovale)     Cerebrovascular accident (CVA) ([facility] [address] 03/11/2019    Leukocytosis 03/11/2019    Head ache 03/11/2019    Acute medial meniscal tear 07/29/2014    ACL tear 07/29/2014        S pyogenes bacteremia, +BC on 8/14/21  Acute throat/neck pain with +throat GABHS test  Septic pulmonary emboli and empyema   S/p CT placement 8/19/21. Pleural fluid studies pending. GS no organisms seen. Pleural effusion increased today on CXR - d/w Pulm. CTS was consulted by Pulm   Recent BC are negative to date    Elevated LFTs  Liver appears normal on CT      History of PFO s/p repair    GARDENIA discussed with Cardiology - lesion on the closure device in L atrium most likely the post of the device   No valvular vegetations seen.       Rising WBC despite culture directed abx, high grade fever last night       Repeat BC ordered  Abx broadened to linezolid + pip-tazo   Serial CXR  CT mgmt per Pulm. Hope to avoid VATS/decort       Discussed with patient/family, all questions answered  D/w RN, Marc Brito and Minerva Stanford MD  Phone: [phone]   Fax : [phone]

## 2021-08-22 ENCOUNTER — APPOINTMENT (OUTPATIENT)
Dept: GENERAL RADIOLOGY | Age: 36
DRG: 853 | End: 2021-08-22
Payer: COMMERCIAL

## 2021-08-22 LAB
ANION GAP SERPL CALCULATED.3IONS-SCNC: 10 MMOL/L (ref 3–16)
ANISOCYTOSIS: ABNORMAL
ATYPICAL LYMPHOCYTE RELATIVE PERCENT: 4 % (ref 0–6)
BANDED NEUTROPHILS RELATIVE PERCENT: 23 % (ref 0–7)
BASOPHILS ABSOLUTE: 0.3 K/UL (ref 0–0.2)
BASOPHILS RELATIVE PERCENT: 1 %
BODY FLUID CULTURE, STERILE: NORMAL
BUN BLDV-MCNC: 14 MG/DL (ref 7–20)
CALCIUM SERPL-MCNC: 8.5 MG/DL (ref 8.3–10.6)
CHLORIDE BLD-SCNC: 95 MMOL/L (ref 99–110)
CO2: 26 MMOL/L (ref 21–32)
CREAT SERPL-MCNC: 1 MG/DL (ref 0.9–1.3)
EOSINOPHILS ABSOLUTE: 0 K/UL (ref 0–0.6)
EOSINOPHILS RELATIVE PERCENT: 0 %
GFR AFRICAN AMERICAN: >60
GFR NON-AFRICAN AMERICAN: >60
GLUCOSE BLD-MCNC: 135 MG/DL (ref 70–99)
GRAM STAIN RESULT: NORMAL
HCT VFR BLD CALC: 34.2 % (ref 40.5–52.5)
HEMOGLOBIN: 11.7 G/DL (ref 13.5–17.5)
HYPOCHROMIA: ABNORMAL
LYMPHOCYTES ABSOLUTE: 2.5 K/UL (ref 1–5.1)
LYMPHOCYTES RELATIVE PERCENT: 5 %
MCH RBC QN AUTO: 29.8 PG (ref 26–34)
MCHC RBC AUTO-ENTMCNC: 34.2 G/DL (ref 31–36)
MCV RBC AUTO: 87.2 FL (ref 80–100)
METAMYELOCYTES RELATIVE PERCENT: 1 %
MONOCYTES ABSOLUTE: 2.2 K/UL (ref 0–1.3)
MONOCYTES RELATIVE PERCENT: 8 %
MYELOCYTE PERCENT: 1 %
NEUTROPHILS ABSOLUTE: 22.6 K/UL (ref 1.7–7.7)
NEUTROPHILS RELATIVE PERCENT: 57 %
OVALOCYTES: ABNORMAL
PDW BLD-RTO: 14.4 % (ref 12.4–15.4)
PLATELET # BLD: 672 K/UL (ref 135–450)
PMV BLD AUTO: 6.8 FL (ref 5–10.5)
POIKILOCYTES: ABNORMAL
POTASSIUM SERPL-SCNC: 4.4 MMOL/L (ref 3.5–5.1)
RBC # BLD: 3.92 M/UL (ref 4.2–5.9)
SODIUM BLD-SCNC: 131 MMOL/L (ref 136–145)
WBC # BLD: 27.6 K/UL (ref 4–11)

## 2021-08-22 PROCEDURE — 6370000000 HC RX 637 (ALT 250 FOR IP): Performed by: INTERNAL MEDICINE

## 2021-08-22 PROCEDURE — 2580000003 HC RX 258: Performed by: INTERNAL MEDICINE

## 2021-08-22 PROCEDURE — 6360000002 HC RX W HCPCS: Performed by: INTERNAL MEDICINE

## 2021-08-22 PROCEDURE — 80048 BASIC METABOLIC PNL TOTAL CA: CPT

## 2021-08-22 PROCEDURE — 2060000000 HC ICU INTERMEDIATE R&B

## 2021-08-22 PROCEDURE — 71046 X-RAY EXAM CHEST 2 VIEWS: CPT

## 2021-08-22 PROCEDURE — 99232 SBSQ HOSP IP/OBS MODERATE 35: CPT | Performed by: INTERNAL MEDICINE

## 2021-08-22 PROCEDURE — 85025 COMPLETE CBC W/AUTO DIFF WBC: CPT

## 2021-08-22 PROCEDURE — 32562 LYSE CHEST FIBRIN SUBQ DAY: CPT | Performed by: INTERNAL MEDICINE

## 2021-08-22 PROCEDURE — 3E05317 INTRODUCTION OF OTHER THROMBOLYTIC INTO PERIPHERAL ARTERY, PERCUTANEOUS APPROACH: ICD-10-PCS | Performed by: INTERNAL MEDICINE

## 2021-08-22 RX ORDER — OXYCODONE HYDROCHLORIDE AND ACETAMINOPHEN 5; 325 MG/1; MG/1
2 TABLET ORAL EVERY 4 HOURS PRN
Status: DISCONTINUED | OUTPATIENT
Start: 2021-08-22 | End: 2021-08-23

## 2021-08-22 RX ADMIN — SODIUM CHLORIDE, PRESERVATIVE FREE 10 ML: 5 INJECTION INTRAVENOUS at 11:17

## 2021-08-22 RX ADMIN — LINEZOLID 600 MG: 600 TABLET, FILM COATED ORAL at 20:32

## 2021-08-22 RX ADMIN — ATORVASTATIN CALCIUM 40 MG: 10 TABLET, FILM COATED ORAL at 20:32

## 2021-08-22 RX ADMIN — SODIUM CHLORIDE, PRESERVATIVE FREE 10 ML: 5 INJECTION INTRAVENOUS at 21:37

## 2021-08-22 RX ADMIN — HYDROMORPHONE HYDROCHLORIDE 1 MG: 2 INJECTION, SOLUTION INTRAMUSCULAR; INTRAVENOUS; SUBCUTANEOUS at 18:22

## 2021-08-22 RX ADMIN — OXYCODONE HYDROCHLORIDE AND ACETAMINOPHEN 1 TABLET: 5; 325 TABLET ORAL at 09:27

## 2021-08-22 RX ADMIN — PIPERACILLIN AND TAZOBACTAM 3375 MG: 3; .375 INJECTION, POWDER, LYOPHILIZED, FOR SOLUTION INTRAVENOUS at 18:40

## 2021-08-22 RX ADMIN — OXYCODONE HYDROCHLORIDE AND ACETAMINOPHEN 1 TABLET: 5; 325 TABLET ORAL at 16:05

## 2021-08-22 RX ADMIN — ENOXAPARIN SODIUM 40 MG: 40 INJECTION SUBCUTANEOUS at 09:27

## 2021-08-22 RX ADMIN — PIPERACILLIN AND TAZOBACTAM 3375 MG: 3; .375 INJECTION, POWDER, LYOPHILIZED, FOR SOLUTION INTRAVENOUS at 02:35

## 2021-08-22 RX ADMIN — KETOROLAC TROMETHAMINE 30 MG: 30 INJECTION, SOLUTION INTRAMUSCULAR; INTRAVENOUS at 02:36

## 2021-08-22 RX ADMIN — DORNASE ALFA 5 MG: 1 SOLUTION RESPIRATORY (INHALATION) at 18:27

## 2021-08-22 RX ADMIN — HYDROMORPHONE HYDROCHLORIDE 1 MG: 2 INJECTION, SOLUTION INTRAMUSCULAR; INTRAVENOUS; SUBCUTANEOUS at 00:11

## 2021-08-22 RX ADMIN — PIPERACILLIN AND TAZOBACTAM 3375 MG: 3; .375 INJECTION, POWDER, LYOPHILIZED, FOR SOLUTION INTRAVENOUS at 11:10

## 2021-08-22 RX ADMIN — OXYCODONE HYDROCHLORIDE AND ACETAMINOPHEN 1 TABLET: 5; 325 TABLET ORAL at 16:46

## 2021-08-22 RX ADMIN — LINEZOLID 600 MG: 600 TABLET, FILM COATED ORAL at 09:34

## 2021-08-22 RX ADMIN — METOPROLOL TARTRATE 25 MG: 25 TABLET, FILM COATED ORAL at 09:27

## 2021-08-22 RX ADMIN — OXYCODONE HYDROCHLORIDE AND ACETAMINOPHEN 2 TABLET: 5; 325 TABLET ORAL at 20:31

## 2021-08-22 RX ADMIN — ASPIRIN 81 MG: 81 TABLET, COATED ORAL at 09:27

## 2021-08-22 RX ADMIN — ALTEPLASE 10 MG: 2.2 INJECTION, POWDER, LYOPHILIZED, FOR SOLUTION INTRAVENOUS at 18:27

## 2021-08-22 RX ADMIN — METOPROLOL TARTRATE 25 MG: 25 TABLET, FILM COATED ORAL at 20:32

## 2021-08-22 RX ADMIN — Medication 10 ML: at 02:37

## 2021-08-22 RX ADMIN — DOCUSATE SODIUM 100 MG: 100 CAPSULE, LIQUID FILLED ORAL at 09:27

## 2021-08-22 RX ADMIN — HYDROMORPHONE HYDROCHLORIDE 1 MG: 2 INJECTION, SOLUTION INTRAMUSCULAR; INTRAVENOUS; SUBCUTANEOUS at 21:37

## 2021-08-22 ASSESSMENT — PAIN DESCRIPTION - FREQUENCY
FREQUENCY: CONTINUOUS

## 2021-08-22 ASSESSMENT — PAIN SCALES - GENERAL
PAINLEVEL_OUTOF10: 4
PAINLEVEL_OUTOF10: 7
PAINLEVEL_OUTOF10: 10
PAINLEVEL_OUTOF10: 4
PAINLEVEL_OUTOF10: 7
PAINLEVEL_OUTOF10: 7
PAINLEVEL_OUTOF10: 8
PAINLEVEL_OUTOF10: 8
PAINLEVEL_OUTOF10: 4
PAINLEVEL_OUTOF10: 6
PAINLEVEL_OUTOF10: 6
PAINLEVEL_OUTOF10: 7
PAINLEVEL_OUTOF10: 10
PAINLEVEL_OUTOF10: 7

## 2021-08-22 ASSESSMENT — PAIN DESCRIPTION - LOCATION
LOCATION: CHEST
LOCATION: CHEST
LOCATION: RIB CAGE;BACK

## 2021-08-22 ASSESSMENT — PAIN DESCRIPTION - ORIENTATION
ORIENTATION: LEFT

## 2021-08-22 ASSESSMENT — PAIN DESCRIPTION - PAIN TYPE
TYPE: ACUTE PAIN

## 2021-08-22 ASSESSMENT — PAIN DESCRIPTION - ONSET
ONSET: ON-GOING
ONSET: ON-GOING

## 2021-08-22 ASSESSMENT — PAIN DESCRIPTION - DESCRIPTORS
DESCRIPTORS: ACHING;DISCOMFORT
DESCRIPTORS: DISCOMFORT;CONSTANT
DESCRIPTORS: DISCOMFORT;CONSTANT;ACHING

## 2021-08-22 ASSESSMENT — PAIN - FUNCTIONAL ASSESSMENT: PAIN_FUNCTIONAL_ASSESSMENT: PREVENTS OR INTERFERES WITH ALL ACTIVE AND SOME PASSIVE ACTIVITIES

## 2021-08-22 NOTE — PROGRESS NOTES
At request of primary RN, pt medicated per [facility] with IV Dilaudid 1 mg for 7/10 pain at chest tube site.  Primary RN will re-eval for therapeutic response

## 2021-08-22 NOTE — PROGRESS NOTES
Patient was seen. CT scan was reviewed.   Detailed discussion with the plan was performed with the patient's he will get TPA dornase today CT scan of the chest tomorrow if the CT showed any residual disease we will plan for decortication Tuesday

## 2021-08-22 NOTE — PROGRESS NOTES
microspheres      Intake/Output Summary (Last 24 hours) at 8/22/2021 1127  Last data filed at 8/22/2021 0935  Gross per 24 hour   Intake 500 ml   Output 2300 ml   Net -1800 ml       Physical Exam Performed:    /82   Pulse 114   Temp 98.3 °F (36.8 °C) (Oral)   Resp 16   Ht 6' (1.829 m)   Wt 178 lb 9.2 oz (81 kg)   SpO2 92%   BMI 24.22 kg/m²     General appearance: No apparent distress, appears stated age and cooperative. HEENT: Pupils equal, round, and reactive to light. Conjunctivae/corneas clear. Neck: Supple, with full range of motion. No jugular venous distention. Trachea midline. Respiratory:  Normal respiratory effort. Decreased breath sounds on left without Rales/Wheezes/Rhonchi. Chest tube in place  Cardiovascular: tachycardia, regular rhythm with normal S1/S2 without murmurs, rubs or gallops. Abdomen: Soft, non-tender, non-distended with normal bowel sounds. Musculoskeletal: No clubbing, cyanosis or edema bilaterally. Full range of motion without deformity. Skin: Skin color, texture, turgor normal.  No rashes or lesions. Neurologic:  Neurovascularly intact without any focal sensory/motor deficits. Cranial nerves: II-XII intact, grossly non-focal.  Psychiatric: Alert and oriented, thought content appropriate, normal insight  Capillary Refill: Brisk,3 seconds, normal   Peripheral Pulses: +2 palpable, equal bilaterally       Labs:   Recent Labs     08/20/21  0336 08/21/21  0522   WBC 18.2* 33.5*   HGB 12.3* 12.6*   HCT 35.6* 37.2*    553*     Recent Labs     08/20/21  0336 08/21/21  0522   * 134*   K 4.1 4.8   CL 97* 96*   CO2 25 26   BUN 8 11   CREATININE 0.9 0.9   CALCIUM 9.0 8.7   PHOS 3.8 5.0*     Recent Labs     08/20/21  0336 08/21/21  0522   AST 30 24   ALT 55* 38   BILIDIR  --  0.6*   BILITOT 0.9 1.2*   ALKPHOS 222* 175*     No results for input(s): INR in the last 72 hours. No results for input(s): Lorenso Favre in the last 72 hours.     Urinalysis:      Lab Results   Component Value Date    NITRU Negative 08/14/2021    WBCUA 6-9 08/14/2021    BACTERIA 3+ 08/14/2021    RBCUA 11-20 08/14/2021    BLOODU MODERATE 08/14/2021    SPECGRAV 1.025 08/14/2021    GLUCOSEU Negative 08/14/2021       Radiology:  XR CHEST (2 VW)   Final Result   Improving but incompletely resolved left basilar empyema. XR CHEST (2 VW)   Final Result   Moderate left empyema, minimally decreased from prior. XR CHEST PORTABLE   Final Result   Moderate left pleural effusion, increased as compared to prior. Bibasilar atelectasis or airspace disease. CT GUIDED PLEURAL DRAINAGE W [facility]   Preliminary Result   Successful CT-guided chest tube placement. IR PICC WO SQ PORT/PUMP > 5 YEARS   Final Result   Successful placement of PICC line. CT CHEST ABDOMEN PELVIS W CONTRAST   Final Result   1. Significant worsening of left lower lobe pneumonia with developing   loculated parapneumonic effusion. 2. Several rapidly enlarging right-sided subpleural pulmonary nodules. Septic emboli are a diagnostic consideration. 3. No acute abdominopelvic abnormality. XR CHEST (2 VW)   Final Result   Increased bibasilar airspace disease with small left-sided pleural effusion         CT SOFT TISSUE NECK WO CONTRAST   Final Result   No acute abnormality of the soft tissue structures of the neck. CT CHEST PULMONARY EMBOLISM W CONTRAST   Final Result   1. Multifocal airspace disease, including scattered nodular airspace disease. Correlate with presentation. Follow-up to resolution recommended. 2. Suboptimal opacification of the pulmonary arteries. No acute pulmonary   embolism to the lobar arteries given limitation. 3. Other findings as described.          XR CHEST (2 VW)    (Results Pending)           Assessment/Plan:    Active Hospital Problems    Diagnosis     Loculated pleural effusion [J90]     Septic embolism (HCC) [I76]     Atelectasis of left lung [J98.11]     Abnormal LFTs [R94.5]     Former smoker [N58.710]     Marijuana smoker [F12.90]     Pharyngitis due to Streptococcus species [J02.0]     S/P patent foramen ovale closure [Z87.74]     Streptococcal bacteremia [R78.81, B95.5]     Cerebrovascular accident (CVA) ([address] [I63.9]      Sepsis 2/2 strep bacteremia, empyema  - ID consulted  - continues to appear septic  - continue zyvox, zosyn  - no clear indication of vegetations on GARDENIA. Will need repeat GARDENIA once abx course completed  - pulm consulted  - continues to have high output from chest tube  - continue tPA dwells  - little improvement on CXR  - CT surgery consulted  - possible VATS next week if not improving    Elevated LFTs  - likely septic  - improving with supportive care    H/O CVA  - s/p PFO closure  - continue ASA, statin    HTN  - well controlled  - continue metoprolol    HLD  - continue statin    DVT Prophylaxis: lovenox  Diet: ADULT DIET;  Regular  Adult Oral Nutrition Supplement; Standard High Calorie/High Protein Oral Supplement  Code Status: Full Code    PT/OT Eval Status: not ordered    Dispo - at least 2-4 days given ongoing sepsis    Srinivasa Renee MD

## 2021-08-22 NOTE — PROGRESS NOTES
Page sent to [facility] \"Pt is c/o pain and requested Toradol. The Toradol order has  but is there something else we can order that will help. Would we be able to do two Percocet. We are getting ready to do the TPA again and it caused a lot of pain last time. \". Waiting for response.

## 2021-08-22 NOTE — PROGRESS NOTES
Pt resting quietly, stated \" I'm Ok\" stated pain on his left side is tolerable 4/10, denies any needs at this time. Appears comfortable. Will continue to monitor.  MKRN

## 2021-08-22 NOTE — FLOWSHEET NOTE
08/21/21 2026   Assessment   Charting Type Shift assessment   Neurological   Orientation Level Oriented X4   Cognition Appropriate judgement; Appropriate safety awareness; Appropriate attention/concentration; Appropriate for developmental age   [de-identified] Coma Scale   Eye Opening 4   Best Verbal Response 5   Best Motor Response 6   Mylene Coma Scale Score 15   HEENT   HEENT (WDL) WDL   Respiratory   Respiratory Pattern Regular   Respiratory Depth Shallow   Respiratory Quality/Effort Dyspnea with exertion;Dyspnea at rest   Chest Assessment Chest expansion symmetrical   Breath Sounds   Right Upper Lobe Diminished   Right Middle Lobe Diminished   Right Lower Lobe Diminished   Left Upper Lobe Diminished   Left Lower Lobe Diminished   Cardiac   Cardiac Regularity Regular   Heart Sounds S1, S2   Cardiac Rhythm Sinus tachy   Cardiac Monitor   Telemetry Monitor On Yes   Telemetry Audible Yes   Telemetry Alarms Set Yes   Telemetry Box Number [id number]   Gastrointestinal   Abdominal (WDL) WDL   Peripheral Vascular   Peripheral Vascular (WDL) WDL   Edema None   Skin Color/Condition   Skin Color/Condition (WDL) WDL   Musculoskeletal   RUE Full movement   LUE Limited movement  (CT site)   RL Extremity Full movement   LL Extremity Full movement   Genitourinary   Genitourinary (WDL) WDL   Flank Tenderness No   Suprapubic Tenderness No   Dysuria No   Urine Assessment   Urine Color Yellow/straw   Urine Appearance Clear   Anus/Rectum   Anus/Rectum (WDL) WDL   Chest Tube 1 Left Pleural 10 Afghan   Placement Date/Time: 08/19/21 1252   Timeout: Patient;Procedure;Site/Side;Consent Confirmed; Appropriate Equipment;Sterile Technique using full body drape;Site prepped with chlorhexidine;Sterile drsg with biopatch; Availability of Implant; Correct Positi. .. Suction -20 cm H2O   Chest Tube Airleak No   Drainage Description Bright red;Serosanguinous   Dressing Status Clean;Dry; Intact   Dressing Type Dry dressing   Dressing Change Due 08/22/21   Site Assessment Clean;Dry; Intact   Surrounding Skin Dry   Patency Intervention Tip/Tilt   Psychosocial   Patient Behaviors Calm; Cooperative   Pt in bed, c/o \" Left side hurts\" 8/10, PRN meds administered. Updated with POC. Pt kept comfortable in bed. Safety/fall prevention maintained. Personal belongings and call light within reach.  MICHELLERN

## 2021-08-23 ENCOUNTER — APPOINTMENT (OUTPATIENT)
Dept: CT IMAGING | Age: 36
DRG: 853 | End: 2021-08-23
Payer: COMMERCIAL

## 2021-08-23 ENCOUNTER — APPOINTMENT (OUTPATIENT)
Dept: GENERAL RADIOLOGY | Age: 36
DRG: 853 | End: 2021-08-23
Payer: COMMERCIAL

## 2021-08-23 LAB
ABO/RH: NORMAL
ANTIBODY SCREEN: NORMAL
HCT VFR BLD CALC: 29.9 % (ref 40.5–52.5)
HEMOGLOBIN: 10.2 G/DL (ref 13.5–17.5)
MCH RBC QN AUTO: 30.3 PG (ref 26–34)
MCHC RBC AUTO-ENTMCNC: 34.2 G/DL (ref 31–36)
MCV RBC AUTO: 88.5 FL (ref 80–100)
PDW BLD-RTO: 14.5 % (ref 12.4–15.4)
PLATELET # BLD: 665 K/UL (ref 135–450)
PMV BLD AUTO: 6.5 FL (ref 5–10.5)
RBC # BLD: 3.38 M/UL (ref 4.2–5.9)
WBC # BLD: 23 K/UL (ref 4–11)

## 2021-08-23 PROCEDURE — 6370000000 HC RX 637 (ALT 250 FOR IP): Performed by: INTERNAL MEDICINE

## 2021-08-23 PROCEDURE — 2060000000 HC ICU INTERMEDIATE R&B

## 2021-08-23 PROCEDURE — 99232 SBSQ HOSP IP/OBS MODERATE 35: CPT | Performed by: INTERNAL MEDICINE

## 2021-08-23 PROCEDURE — 6360000002 HC RX W HCPCS: Performed by: INTERNAL MEDICINE

## 2021-08-23 PROCEDURE — 85027 COMPLETE CBC AUTOMATED: CPT

## 2021-08-23 PROCEDURE — 71250 CT THORAX DX C-: CPT

## 2021-08-23 PROCEDURE — 6370000000 HC RX 637 (ALT 250 FOR IP): Performed by: NURSE PRACTITIONER

## 2021-08-23 PROCEDURE — 86901 BLOOD TYPING SEROLOGIC RH(D): CPT

## 2021-08-23 PROCEDURE — 2580000003 HC RX 258: Performed by: INTERNAL MEDICINE

## 2021-08-23 PROCEDURE — 99232 SBSQ HOSP IP/OBS MODERATE 35: CPT | Performed by: NURSE PRACTITIONER

## 2021-08-23 PROCEDURE — 86850 RBC ANTIBODY SCREEN: CPT

## 2021-08-23 PROCEDURE — 86900 BLOOD TYPING SEROLOGIC ABO: CPT

## 2021-08-23 PROCEDURE — 36592 COLLECT BLOOD FROM PICC: CPT

## 2021-08-23 PROCEDURE — 71046 X-RAY EXAM CHEST 2 VIEWS: CPT

## 2021-08-23 RX ORDER — HYDROCODONE BITARTRATE AND ACETAMINOPHEN 5; 325 MG/1; MG/1
2 TABLET ORAL EVERY 4 HOURS PRN
Status: DISCONTINUED | OUTPATIENT
Start: 2021-08-23 | End: 2021-08-24

## 2021-08-23 RX ORDER — LIDOCAINE 4 G/G
1 PATCH TOPICAL DAILY
Status: DISCONTINUED | OUTPATIENT
Start: 2021-08-23 | End: 2021-08-24

## 2021-08-23 RX ORDER — HYDROCODONE BITARTRATE AND ACETAMINOPHEN 5; 325 MG/1; MG/1
1 TABLET ORAL EVERY 4 HOURS PRN
Status: DISCONTINUED | OUTPATIENT
Start: 2021-08-23 | End: 2021-08-24

## 2021-08-23 RX ADMIN — PIPERACILLIN AND TAZOBACTAM 3375 MG: 3; .375 INJECTION, POWDER, LYOPHILIZED, FOR SOLUTION INTRAVENOUS at 02:31

## 2021-08-23 RX ADMIN — SODIUM CHLORIDE, PRESERVATIVE FREE 10 ML: 5 INJECTION INTRAVENOUS at 20:35

## 2021-08-23 RX ADMIN — ATORVASTATIN CALCIUM 40 MG: 10 TABLET, FILM COATED ORAL at 20:33

## 2021-08-23 RX ADMIN — HYDROCODONE BITARTRATE AND ACETAMINOPHEN 2 TABLET: 5; 325 TABLET ORAL at 16:19

## 2021-08-23 RX ADMIN — HYDROMORPHONE HYDROCHLORIDE 1 MG: 2 INJECTION, SOLUTION INTRAMUSCULAR; INTRAVENOUS; SUBCUTANEOUS at 22:52

## 2021-08-23 RX ADMIN — HYDROMORPHONE HYDROCHLORIDE 1 MG: 2 INJECTION, SOLUTION INTRAMUSCULAR; INTRAVENOUS; SUBCUTANEOUS at 02:31

## 2021-08-23 RX ADMIN — HYDROMORPHONE HYDROCHLORIDE 1 MG: 2 INJECTION, SOLUTION INTRAMUSCULAR; INTRAVENOUS; SUBCUTANEOUS at 15:22

## 2021-08-23 RX ADMIN — LINEZOLID 600 MG: 600 TABLET, FILM COATED ORAL at 20:31

## 2021-08-23 RX ADMIN — OXYCODONE HYDROCHLORIDE AND ACETAMINOPHEN 2 TABLET: 5; 325 TABLET ORAL at 01:08

## 2021-08-23 RX ADMIN — METOPROLOL TARTRATE 25 MG: 25 TABLET, FILM COATED ORAL at 08:30

## 2021-08-23 RX ADMIN — SODIUM CHLORIDE, PRESERVATIVE FREE 10 ML: 5 INJECTION INTRAVENOUS at 08:33

## 2021-08-23 RX ADMIN — PIPERACILLIN AND TAZOBACTAM 3375 MG: 3; .375 INJECTION, POWDER, LYOPHILIZED, FOR SOLUTION INTRAVENOUS at 18:39

## 2021-08-23 RX ADMIN — ENOXAPARIN SODIUM 40 MG: 40 INJECTION SUBCUTANEOUS at 08:30

## 2021-08-23 RX ADMIN — ASPIRIN 81 MG: 81 TABLET, COATED ORAL at 08:29

## 2021-08-23 RX ADMIN — HYDROMORPHONE HYDROCHLORIDE 1 MG: 2 INJECTION, SOLUTION INTRAMUSCULAR; INTRAVENOUS; SUBCUTANEOUS at 11:55

## 2021-08-23 RX ADMIN — HYDROMORPHONE HYDROCHLORIDE 1 MG: 2 INJECTION, SOLUTION INTRAMUSCULAR; INTRAVENOUS; SUBCUTANEOUS at 08:28

## 2021-08-23 RX ADMIN — OXYCODONE HYDROCHLORIDE AND ACETAMINOPHEN 2 TABLET: 5; 325 TABLET ORAL at 05:11

## 2021-08-23 RX ADMIN — HYDROMORPHONE HYDROCHLORIDE 1 MG: 2 INJECTION, SOLUTION INTRAMUSCULAR; INTRAVENOUS; SUBCUTANEOUS at 18:34

## 2021-08-23 RX ADMIN — DOCUSATE SODIUM 100 MG: 100 CAPSULE, LIQUID FILLED ORAL at 08:33

## 2021-08-23 RX ADMIN — LINEZOLID 600 MG: 600 TABLET, FILM COATED ORAL at 08:30

## 2021-08-23 RX ADMIN — PIPERACILLIN AND TAZOBACTAM 3375 MG: 3; .375 INJECTION, POWDER, LYOPHILIZED, FOR SOLUTION INTRAVENOUS at 11:57

## 2021-08-23 RX ADMIN — METOPROLOL TARTRATE 25 MG: 25 TABLET, FILM COATED ORAL at 20:31

## 2021-08-23 RX ADMIN — HYDROCODONE BITARTRATE AND ACETAMINOPHEN 1 TABLET: 5; 325 TABLET ORAL at 20:31

## 2021-08-23 RX ADMIN — HYDROCODONE BITARTRATE AND ACETAMINOPHEN 2 TABLET: 5; 325 TABLET ORAL at 09:19

## 2021-08-23 ASSESSMENT — PAIN SCALES - GENERAL
PAINLEVEL_OUTOF10: 7
PAINLEVEL_OUTOF10: 6
PAINLEVEL_OUTOF10: 7
PAINLEVEL_OUTOF10: 7
PAINLEVEL_OUTOF10: 6
PAINLEVEL_OUTOF10: 6
PAINLEVEL_OUTOF10: 7
PAINLEVEL_OUTOF10: 6
PAINLEVEL_OUTOF10: 7
PAINLEVEL_OUTOF10: 5
PAINLEVEL_OUTOF10: 6
PAINLEVEL_OUTOF10: 6
PAINLEVEL_OUTOF10: 7
PAINLEVEL_OUTOF10: 7

## 2021-08-23 ASSESSMENT — PAIN DESCRIPTION - LOCATION
LOCATION: BACK;RIB CAGE

## 2021-08-23 ASSESSMENT — PAIN DESCRIPTION - FREQUENCY
FREQUENCY: CONTINUOUS
FREQUENCY: CONTINUOUS

## 2021-08-23 ASSESSMENT — PAIN DESCRIPTION - ORIENTATION
ORIENTATION: LEFT

## 2021-08-23 ASSESSMENT — PAIN DESCRIPTION - PAIN TYPE
TYPE: ACUTE PAIN

## 2021-08-23 ASSESSMENT — PAIN DESCRIPTION - DESCRIPTORS
DESCRIPTORS: ACHING
DESCRIPTORS: ACHING

## 2021-08-23 NOTE — PROGRESS NOTES
Hospitalist Progress Note      PCP: Urszula Ponce MD    Chief Complaint. Patient is a 43-year-old male who presented to hospital for positive blood cultures. Date of Admission: 8/15/2021    Subjective: He is in pain and feels shortness of breath while walking otherwise denies chest pain, nausea, vomiting, fever or chills. Medications:  Reviewed    Infusion Medications    sodium chloride      sodium chloride Stopped (08/20/21 1207)     Scheduled Medications    lidocaine  1 patch Transdermal Daily    piperacillin-tazobactam  3,375 mg Intravenous Q8H    linezolid  600 mg Oral 2 times per day    alteplase (ACTIVASE) syringe  10 mg Intrapleural Once    And    dornase (PULMOZYME) syringe  5 mg Intrapleural Once    metoprolol tartrate  25 mg Oral BID    [Held by provider] enoxaparin  40 mg Subcutaneous Daily    docusate sodium  100 mg Oral Daily    atorvastatin  40 mg Oral Nightly    aspirin EC  81 mg Oral Daily    sodium chloride flush  5-40 mL Intravenous 2 times per day     PRN Meds: HYDROcodone 5 mg - acetaminophen **OR** HYDROcodone 5 mg - acetaminophen, HYDROmorphone, hydrALAZINE, perflutren lipid microspheres, sodium chloride flush, sodium chloride, ondansetron **OR** ondansetron, polyethylene glycol, acetaminophen **OR** acetaminophen, benzocaine-menthol, perflutren lipid microspheres      Intake/Output Summary (Last 24 hours) at 8/23/2021 1858  Last data filed at 8/23/2021 1833  Gross per 24 hour   Intake 1680 ml   Output 2615 ml   Net -935 ml       Physical Exam Performed:    /83   Pulse 114   Temp 97.4 °F (36.3 °C) (Oral)   Resp 22   Ht 6' (1.829 m)   Wt 179 lb 3.7 oz (81.3 kg)   SpO2 90%   BMI 24.31 kg/m²     General appearance: No apparent distress,   HEENT:  Conjunctivae/corneas clear. Neck: Supple, with full range of motion. Respiratory:  Normal respiratory effort. Clear to auscultation, bilaterally without Rales/Wheezes/Rhonchi.   Cardiovascular: Regular rate and rhythm with normal S1/S2 without murmurs or rubs  Abdomen: Soft, non-tender, non-distended, normal bowel sounds. Musculoskeletal: No cyanosis or edema bilaterally  Neurologic:  without any focal sensory/motor deficits. grossly non-focal.  Psychiatric: Alert and oriented, Normal mood  Peripheral Pulses: +2 palpable, equal bilaterally       Labs:   Recent Labs     08/21/21  0522 08/22/21  1154 08/23/21  0631   WBC 33.5* 27.6* 23.0*   HGB 12.6* 11.7* 10.2*   HCT 37.2* 34.2* 29.9*   * 672* 665*     Recent Labs     08/21/21  0522 08/22/21  1154   * 131*   K 4.8 4.4   CL 96* 95*   CO2 26 26   BUN 11 14   CREATININE 0.9 1.0   CALCIUM 8.7 8.5   PHOS 5.0*  --      Recent Labs     08/21/21  0522   AST 24   ALT 38   BILIDIR 0.6*   BILITOT 1.2*   ALKPHOS 175*     No results for input(s): INR in the last 72 hours. No results for input(s): Donah Keens in the last 72 hours. Urinalysis:      Lab Results   Component Value Date    NITRU Negative 08/14/2021    WBCUA 6-9 08/14/2021    BACTERIA 3+ 08/14/2021    RBCUA 11-20 08/14/2021    BLOODU MODERATE 08/14/2021    SPECGRAV 1.025 08/14/2021    GLUCOSEU Negative 08/14/2021       Radiology:  XR CHEST (2 VW)   Final Result   Unchanged left basilar hydropneumothorax. CT CHEST WO CONTRAST   Final Result   Small left hydropneumothorax, with chest tube in place extending to the left   costophrenic angle. Partial consolidation of the left lower lobe is again seen, compatible with   atelectasis or pneumonia. Noncalcified pulmonary nodules are similar to prior, for which continued CT   follow-up to resolution is recommended. RECOMMENDATIONS:   Fleischner Society guidelines for follow-up and management of incidentally   detected pulmonary nodules:      Single Solid Nodule:      Nodule size less than 6 mm   In a low-risk patient, no routine follow-up. In a high-risk patient, optional CT at 12 months.       Nodule size equals 6-8 mm   In a low-risk patient, CT at 6-12 months, then consider CT at 18-24 months. In a high-risk patient, CT at 6-12 months, then CT at 18-24 months. Nodule size greater than 8 mm         In a low-risk patient, consider CT at 3 months, PET/CT, or tissue sampling. In a high-risk patient, consider CT at 3 months, PET/CT, or tissue sampling. Multiple Solid Nodules:      Nodule size less than 6 mm   In a low-risk patient, no routine follow-up. In a high-risk patient, optional CT at 12 months. Nodule size equals 6-8 mm   In a low-risk patient, CT at 3-6 months, then consider CT at 18-24 months. In a high-risk patient, CT at 3-6 months, then CT at 18-24 months. Nodule size greater than 8 mm   In a low-risk patient, CT at 3-6 months, then consider CT at 18-24 months. In a high-risk patient, CT at 3-6 months, then CT at 18-24 months. - Low risk patients include individuals with minimal or absent history of   smoking and other known risk factors. - High risk patients include individuals with a history or smoking or known   risk factors. Radiology 2017 http://pubs. rsna.org/doi/full/10.1148/radiol. 7660857608         XR CHEST (2 VW)   Final Result   Improving but incompletely resolved left basilar empyema. XR CHEST (2 VW)   Final Result   Moderate left empyema, minimally decreased from prior. XR CHEST PORTABLE   Final Result   Moderate left pleural effusion, increased as compared to prior. Bibasilar atelectasis or airspace disease. CT GUIDED PLEURAL DRAINAGE W [facility]   Final Result   Successful CT-guided chest tube placement. IR PICC WO SQ PORT/PUMP > 5 YEARS   Final Result   Successful placement of PICC line. CT CHEST ABDOMEN PELVIS W CONTRAST   Final Result   1. Significant worsening of left lower lobe pneumonia with developing   loculated parapneumonic effusion. 2. Several rapidly enlarging right-sided subpleural pulmonary nodules. Septic emboli are a diagnostic consideration. 3. No acute abdominopelvic abnormality. XR CHEST (2 VW)   Final Result   Increased bibasilar airspace disease with small left-sided pleural effusion         CT SOFT TISSUE NECK WO CONTRAST   Final Result   No acute abnormality of the soft tissue structures of the neck. CT CHEST PULMONARY EMBOLISM W CONTRAST   Final Result   1. Multifocal airspace disease, including scattered nodular airspace disease. Correlate with presentation. Follow-up to resolution recommended. 2. Suboptimal opacification of the pulmonary arteries. No acute pulmonary   embolism to the lobar arteries given limitation. 3. Other findings as described. Assessment/Plan:    Active Hospital Problems    Diagnosis     Loculated pleural effusion [J90]     Septic embolism (HCC) [I76]     Atelectasis of left lung [J98.11]     Abnormal LFTs [R94.5]     Former smoker [Z87.891]     Marijuana smoker [F12.90]     Pharyngitis due to Streptococcus species [J02.0]     S/P patent foramen ovale closure [Z87.74]     Streptococcal bacteremia [R78.81, B95.5]     Cerebrovascular accident (CVA) [facility]) [I63.9]        Patient is a 28-year-old male who presented to hospital for positive blood cultures. Assessment  Sepsis secondary to empyema  Strep bacteremia  Elevated LFTs  Hypertension  Hyperlipidemia  History of CVA    Plan  Continue on Zosyn, Zyvox per ID following  Plan for decortication surgery per cardiothoracic surgery, timing to be determined  Pain control  Blood cultures negative to date  We will check LFTs tomorrow, BMP, CBC  Continue medications including aspirin, statin, Lopressor  On IV fluid therapy with normal saline, can DC tomorrow after the procedure if patient continues to tolerate diet  Diet: ADULT DIET;  Regular  Adult Oral Nutrition Supplement; Standard High Calorie/High Protein Oral Supplement  Diet NPO Exceptions are: Sips of Water with Meds  Code Status: Full Code    PT/OT Eval Status: ordered    Dispo -continue IV antibiotics, plan for VATS tomorrow per cardiothoracic    Opal Roper MD

## 2021-08-23 NOTE — PROGRESS NOTES
CVTS Thoracic Progress Note:          CC: Left sided empyema     Subj: awake, sitting up in bed, breathing with ease, in NAD. Obj:    Blood pressure 117/77, pulse 100, temperature 97.5 °F (36.4 °C), temperature source Oral, resp. rate 20, height 6' (1.829 m), weight 179 lb 3.7 oz (81.3 kg), SpO2 93 %. Lungs diminished to left   Abdomen flat, soft, non-tender   Incision w/ occlusive dressing, CDI   Chest tube with --/500/540= 1040 ml bloody drainage in last 24 hours/no airleak    Diagnostics:   Recent Labs     08/21/21  0522 08/22/21  1154 08/23/21  0631   WBC 33.5* 27.6* 23.0*   HGB 12.6* 11.7* 10.2*   HCT 37.2* 34.2* 29.9*   * 672* 665*                                                                  Recent Labs     08/21/21  0522 08/22/21  1154   * 131*   K 4.8 4.4   CL 96* 95*   CO2 26 26   BUN 11 14   CREATININE 0.9 1.0   GLUCOSE 113* 135*        CXR: 8/23/21   Impression   Unchanged left basilar hydropneumothorax     CT Chest: 8/23/21   Impression   Small left hydropneumothorax, with chest tube in place extending to the left   costophrenic angle.       Partial consolidation of the left lower lobe is again seen, compatible with   atelectasis or pneumonia.       Noncalcified pulmonary nodules are similar to prior, for which continued CT   follow-up to resolution is recommended.           Assess/Plan:   Left sided empyema:   CT chest imaging reviewed. Despite TPA/Dornase pt will need VATS/decortication (trapped lung). Will schedule for tomorrow. NPO at midnight. All questions answered. Pt c/o pain despite PRN regimen. Will change hydrocodone to [place] (same amount) and add lidocaine patch.   ______________________________________________________    JAMES Oneill - CNP  8/23/2021  10:01 AM   Note reviewed, events of last 24 hours reviewed along with vital signs and I/Os and patient examined. Assessment and plans discussed and are as outlined above.      Juan Carlos Corey MD, FACS, [facility] [place], [address], [place]

## 2021-08-23 NOTE — PROCEDURES
PULM/CCM     tPA and Pulmozyme instilled in the chest tube   Chest tube clamped   Nursing requested to unclamp the chest tube in 4 hours     Ramon Schroeder MD

## 2021-08-23 NOTE — FLOWSHEET NOTE
08/22/21 2305   Vital Signs   Temp 98.4 °F (36.9 °C)   Temp Source Oral   Pulse 108   Heart Rate Source Monitor   Resp 20   /77   BP Location Right upper arm   MAP (mmHg) 89   Level of Consciousness Alert (0)   MEWS Score 2   Patient Currently in Pain Yes   Pain Assessment   Pain Assessment 0-10   Pain Level 6   Oxygen Therapy   SpO2 94 %   Pulse Oximeter Device Mode Intermittent   Pulse Oximeter Device Location Finger   O2 Device None (Room air)     Remains A&O, VSS, sinus tach on tele. Chest tube unclamped and returned to -20 mmHg suction at 2140. 200 mL dark red fluid returned immediately when unclamped; total since is 300 mL. Pt tolerated unclamping well and is aware of pain medication availabilities.

## 2021-08-23 NOTE — PROGRESS NOTES
Care assumed from previous RN. A&O, sinus tach on tele, assessment complete as documented. Chest tube clamped dwelling tPA and Pulmozyme; due to be unclamped at 2130 per day RNs via pulmonology. Pt in severe pain 10/10 which he relates to the intrapleural meds; medicated per [facility] with Percocet 5/325 2 tabs. Will plan for Dilaudid administration prior to unclamping. Aware of need to call for ambulation assistance if weakness of legs or dizziness occurs. Denies other needs at this time.

## 2021-08-23 NOTE — PROGRESS NOTES
INPATIENT PULMONARY CRITICAL CARE PROGRESS NOTE      Reason for visit: Group B streptococcal bacteremia, strep throat, left loculated effusion/empyema, findings suggestive of septic emboli prior PFO closure. SUBJECTIVE: Patient when seen earlier was not having any increasing cough or expectoration, patient had some discomfort and pain at the chest tube site, patient was afebrile and he medically maintained, patient had sinus rhythm on the monitor when seen, patient was not hypoxemic and was on room air oxygen with saturation of 93% when seen,  Patient's chest tube output yesterday was 1.6 L, patient's cumulative fluid balance was -7 L, patient does not give history of any sore throat at this time, patient's glycemic control was acceptable, patient wanted to know his white count, patient was alert and oriented, no other pertinent review of system of concern      Physical Exam:  Blood pressure 135/75, pulse 130, temperature 98.3 °F (36.8 °C), temperature source Oral, resp. rate 28, height 6' (1.829 m), weight 178 lb 9.2 oz (81 kg), SpO2 93 %.'   Constitutional: Pleasant, averagely built, not ill-appearing. In minimal pain due to chest tube  HENT: Oropharynx is clear and moist.  Class II airway, no dental lesions. Eyes:  Conjunctivae are normal.  No scleral icterus. Neck: No JVD. Cardiovascular: Normal rate, regular rhythm, normal heart sounds. Pulmonary/Chest: No wheezes. No rales. Air entry significantly reduced left hemithorax, chest tube present  Abdominal: Deferred. Musculoskeletal: No cyanosis. No clubbing. No obvious joint deformity. Lymphadenopathy: No cervical or supraclavicular adenopathy. Skin: Skin is warm and dry. No rash or nodules on the exposed extremities. Psychiatric: Normal mood and affect. Behavior is normal.  No anxiety. Neurologic: Alert, awake and oriented. PERRL. Speech fluent.   No obvious neurologic deficit, detailed exam not performed      Results:  CBC:   Recent Labs 08/20/21  0336 08/21/21  0522 08/22/21  1154   WBC 18.2* 33.5* 27.6*   HGB 12.3* 12.6* 11.7*   HCT 35.6* 37.2* 34.2*   MCV 87.2 88.0 87.2    553* 672*     BMP:   Recent Labs     08/20/21  0336 08/21/21  0522 08/22/21  1154   * 134* 131*   K 4.1 4.8 4.4   CL 97* 96* 95*   CO2 25 26 26   PHOS 3.8 5.0*  --    BUN 8 11 14   CREATININE 0.9 0.9 1.0     LIVER PROFILE:   Recent Labs     08/20/21 0336 08/21/21 0522   AST 30 24   ALT 55* 38   BILIDIR  --  0.6*   BILITOT 0.9 1.2*   ALKPHOS 222* 175*     PT/INR:   No results for input(s): PROTIME, INR in the last 72 hours. Imaging:  I have reviewed radiology images personally. XR CHEST (2 VW)   Final Result   Improving but incompletely resolved left basilar empyema. XR CHEST (2 VW)   Final Result   Moderate left empyema, minimally decreased from prior. XR CHEST PORTABLE   Final Result   Moderate left pleural effusion, increased as compared to prior. Bibasilar atelectasis or airspace disease. CT GUIDED PLEURAL DRAINAGE W CATH [facility]   Preliminary Result   Successful CT-guided chest tube placement. IR PICC WO SQ PORT/PUMP > 5 YEARS   Final Result   Successful placement of PICC line. CT CHEST ABDOMEN PELVIS W CONTRAST   Final Result   1. Significant worsening of left lower lobe pneumonia with developing   loculated parapneumonic effusion. 2. Several rapidly enlarging right-sided subpleural pulmonary nodules. Septic emboli are a diagnostic consideration. 3. No acute abdominopelvic abnormality. XR CHEST (2 VW)   Final Result   Increased bibasilar airspace disease with small left-sided pleural effusion         CT SOFT TISSUE NECK WO CONTRAST   Final Result   No acute abnormality of the soft tissue structures of the neck. CT CHEST PULMONARY EMBOLISM W CONTRAST   Final Result   1. Multifocal airspace disease, including scattered nodular airspace disease. Correlate with presentation.   Follow-up to resolution recommended. 2. Suboptimal opacification of the pulmonary arteries. No acute pulmonary   embolism to the lobar arteries given limitation. 3. Other findings as described. XR CHEST (2 VW)    (Results Pending)     Echo Complete    Result Date: 8/18/2021  Transthoracic Echocardiography Report (TTE)  Demographics   Patient Name       Aaliyah Gil   Date of Study      08/18/2021         Gender              Male   Patient Number     [id number]         Date of Birth       1985   Visit Number       [id number]          Age                 30 year(s)   Accession Number   [id number]         Room Number         0522   Corporate ID       [id number]           Sonographer         Radha Negro MIKE   Ordering Physician Caterina Maradiaga MD       Physician           Juan James MD, [facility] [place]  Procedure Type of Study   TTE procedure:ECHOCARDIOGRAM COMPLETE 2D W DOPPLER W COLOR. Procedure Date Date: 08/18/2021 Start: 08:19 AM Study Location: [facility] - Echo Lab Technical Quality: Adequate visualization Additional Indications:Bacteremia. Patient Status: Routine Height: 72 inches Weight: 178 pounds BSA: 2.03 m2 BMI: 24.14 kg/m2 BP: 138/90 mmHg  Conclusions   Summary  Normal LV systolic function with a visually estimated ejection fraction of  55-60%. No regional wall motion abnormalities are seen. EF calculated by 3D at 59%. A 25 mm Amplatzer closure device is visualized and appears well seated. No  obvious shunt from color flow. Normal left ventricular diastolic filling pressure. Mild eccentric tricuspid regurgitation. Systolic pulmonary artery pressure (SPAP) is elevated and estimated at 60  mmHg (RAP 15 mmHg) c/w mod-severe pulmonary hypertension. No obvious masses, thrombi, or vegetations are noted.    Signature ------------------------------------------------------------------  Electronically signed by Mariel Briscoe MD, [facility] [place]  (Interpreting physician) on 08/18/2021 at 10:17 AM  ------------------------------------------------------------------   Findings   Left Ventricle  Normal left ventricle size, wall thickness, and systolic function with a  visually estimated ejection fraction of 55-60%. EF calculated by 3D at 59%. No regional wall motion abnormalities are seen. Normal left ventricular diastolic filling pressure. Mitral Valve  The mitral valve is mildly calcified with no restriction of motion. Trace mitral regurgitation. Left Atrium  The left atrium is at the upper limits of normal in size. Aortic Valve  The aortic valve appears normal in structure and function. There is no evidence of aortic regurgitation. Aorta  The aortic root is normal in size. Right Ventricle  The right ventricle is normal in size and function. TAPSE is measured at 27 mm. S velocity is measured at 17.5 cm/s. Tricuspid Valve  The tricuspid valve is normal in structure. Mild eccentric tricuspid regurgitation. Systolic pulmonary artery pressure (SPAP) is elevated and estimated at 60  mmHg (right atrial pressure 15 mmHg) consistent with moderate to severe  pulmonary hypertension. Right Atrium  The right atrium is normal in size. Right atrial area is 19 cm2. Right atrial volume is 22.7 ml/m2. Pulmonic Valve  The pulmonic valve is normal in structure and function. No evidence of pulmonic regurgitation. Pericardial Effusion  No pericardial effusion noted. Pleural Effusion  No pleural effusion noted. Miscellaneous  A 25 mm Amplatzer closure device is visualized and appears well seated. No  obvious shunt from color flow. The IVC is dilated in size (>2.1 cm) and collapses <50% with respiration  consistent with markedly elevated right atrial pressures (15 mmHg) . No obvious masses, thrombi, or vegetations are noted. M-Mode/2D Measurements (cm)   LV Diastolic Dimension: 4.91 cm LV Systolic Dimension: 5.84 cm  LV Septum Diastolic: 1.35 cm  LV PW Diastolic: 0.9 cm         AO Root Dimension: 3.5 cm                                  LA Dimension: 3.7 cm                                  LA Area: 22 cm2  LVOT: 2 cm                      LA volume/Index: 68.85 ml /34 ml/m2  Doppler Measurements   AV Peak Velocity: 136 cm/s     MV Peak E-Wave: 87.4 cm/s  AV Peak Gradient: 7.4 mmHg     MV Peak A-Wave: 70.3 cm/s  LVOT Peak Velocity: 124 cm/s   MV E/A Ratio: 1.24   TR Velocity:337 cm/s  TR Gradient:45.43 mmHg  Estimated RAP:15 mmHg  Estimated RVSP: 60 mmHg  E' Septal Velocity: 11.1 cm/s  E' Lateral Velocity: 16.6 cm/s  E/E' ratio: 6.6   Aortic Valve   Peak Velocity: 136 cm/s  Peak Gradient: 7.4 mmHg  Aorta   Aortic Root: 3.5 cm  LVOT Diameter: 2 cm      XR CHEST (2 VW)    Result Date: 8/17/2021  EXAMINATION: TWO XRAY VIEWS OF THE CHEST 8/17/2021 2:58 pm COMPARISON: 08/14/2020 HISTORY: ORDERING SYSTEM PROVIDED HISTORY: pleuritic pain TECHNOLOGIST PROVIDED HISTORY: Reason for exam:->pleuritic pain Reason for Exam: chest pain Acuity: Acute Type of Exam: Subsequent/Follow-up FINDINGS: Lung volumes are low accentuating heart size and bronchovascular markings at the lung bases. Small left-sided pleural effusion is seen. There is adjacent left basilar consolidation There is hazy right basilar opacity Amplatzer device is seen. Increased bibasilar airspace disease with small left-sided pleural effusion     XR CHEST (2 VW)    Result Date: 8/14/2021  EXAMINATION: TWO XRAY VIEWS OF THE CHEST 8/14/2021 5:46 pm COMPARISON: Chest radiograph performed 10/23/2020. HISTORY: ORDERING SYSTEM PROVIDED HISTORY: SOB TECHNOLOGIST PROVIDED HISTORY: Reason for exam:->SOB Reason for Exam: cp, sob, tachycardia started 2-3 days ago Acuity: Acute Type of Exam: Initial FINDINGS: There is no acute consolidation or effusion. There is no pneumothorax.   The mediastinal structures are unremarkable. The upper abdomen is unremarkable. The extrathoracic soft tissues are unremarkable. There is no acute osseous abnormality. No acute cardiopulmonary process. XR LUMBAR SPINE (2-3 VIEWS)    Result Date: 8/14/2021  EXAMINATION: THREE XRAY VIEWS OF THE LUMBAR SPINE 8/14/2021 6:18 pm COMPARISON: None. HISTORY: ORDERING SYSTEM PROVIDED HISTORY: low back pain TECHNOLOGIST PROVIDED HISTORY: Reason for exam:->low back pain Reason for Exam: low back for a few days; no injury Acuity: Acute Type of Exam: Initial FINDINGS: The vertebral body heights are maintained. The disc spaces are maintained. There is no spondylolisthesis. The visualized bony pelvis is intact. The surrounding soft tissues are unremarkable. No acute vertebral body or disc space abnormality. CT SOFT TISSUE NECK WO CONTRAST    Result Date: 8/16/2021  EXAMINATION: CT OF THE NECK WITHOUT CONTRAST  8/16/2021 TECHNIQUE: CT of the neck was performed without the administration of intravenous contrast. Multiplanar reformatted images are provided for review. Dose modulation, iterative reconstruction, and/or weight based adjustment of the mA/kV was utilized to reduce the radiation dose to as low as reasonably achievable. COMPARISON: None. HISTORY: ORDERING SYSTEM PROVIDED HISTORY: evaluate for suppurative complication of pharyngitis TECHNOLOGIST PROVIDED HISTORY: Reason for exam:->evaluate for suppurative complication of pharyngitis Reason for Exam: sore throat, swollen rt node, difficulty swallowing x 6 days Acuity: Acute Type of Exam: Initial FINDINGS: PHARYNX/LARYNX:  The palatine tonsils are normal in appearance. The tongue is normal in appearance. The valleculae, epiglottis, aryepiglottic folds and pyriform sinuses appear unremarkable. The true and false vocal cords are normal in appearance. No mass or abscess is seen. SALIVARY GLANDS/THYROID:  The parotid and submandibular glands appear unremarkable.   The thyroid gland appears unremarkable. LYMPH NODES:  No cervical or supraclavicular lymphadenopathy is seen. SOFT TISSUES:  No appreciable soft tissue swelling or mass is seen. BRAIN/ORBITS/SINUSES:  The visualized portion of the intracranial contents appear unremarkable. The visualized portion of the orbits, paranasal sinuses and mastoid air cells demonstrate no acute abnormality. LUNG APICES/SUPERIOR MEDIASTINUM:  No focal consolidation is seen within the visualized lung apices. No superior mediastinal lymphadenopathy or mass. The visualized portion of the trachea appears unremarkable. BONES:  No aggressive appearing lytic or blastic bony lesion. No acute abnormality of the soft tissue structures of the neck. CT LUMBAR SPINE W CONTRAST    Result Date: 8/14/2021  EXAMINATION: CT OF THE LUMBAR SPINE WITH CONTRAST  8/14/2021 9:12 pm TECHNIQUE: CT of the lumbar spine was performed with the administration of intravenous contrast. Multiplanar reformatted images are provided for review. Dose modulation, iterative reconstruction, and/or weight based adjustment of the mA/kV was utilized to reduce the radiation dose to as low as reasonably achievable. COMPARISON: None HISTORY: ORDERING SYSTEM PROVIDED HISTORY: low back pain, fever TECHNOLOGIST PROVIDED HISTORY: Reason for exam:->low back pain, fever Decision Support Exception - unselect if not a suspected or confirmed emergency medical condition->Emergency Medical Condition (MA) Reason for Exam: severe b ack pain x few days. more on the left side. fever, sore throath Acuity: Acute Type of Exam: Initial FINDINGS: BONES/ALIGNMENT:  There is normal alignment of the spine. The vertebral body heights are maintained. No osseous destructive lesion is seen. SOFT TISSUES: No acute paraspinal soft tissue abnormality. No abnormal fluid collection or enhancement. Incidental 8 mm left renal cortical cyst appears benign.  L1-L2: There is no significant disc protrusion, central spinal canal stenosis or neural foraminal narrowing. L2-L3: There is no significant disc protrusion, central spinal canal stenosis or neural foraminal narrowing. L3-L4: There is no significant disc protrusion, central spinal canal stenosis or neural foraminal narrowing. L4-L5: There is no significant disc protrusion, central spinal canal stenosis or neural foraminal narrowing. L5-S1: There is no significant disc protrusion, central spinal canal stenosis or neural foraminal narrowing. Unremarkable contrast enhanced CT of the lumbosacral spine. CT CHEST PULMONARY EMBOLISM W CONTRAST    Result Date: 8/15/2021  EXAMINATION: CTA OF THE CHEST 8/15/2021 9:56 pm TECHNIQUE: CTA of the chest was performed after the administration of intravenous contrast.  Multiplanar reformatted images are provided for review. MIP images are provided for review. Dose modulation, iterative reconstruction, and/or weight based adjustment of the mA/kV was utilized to reduce the radiation dose to as low as reasonably achievable. COMPARISON: None. HISTORY: ORDERING SYSTEM PROVIDED HISTORY: pleuritic chest pain , left TECHNOLOGIST PROVIDED HISTORY: Reason for exam:->pleuritic chest pain , left Decision Support Exception - unselect if not a suspected or confirmed emergency medical condition->Emergency Medical Condition (MA) Reason for Exam: left sided back and chest pain Acuity: Acute Type of Exam: Initial FINDINGS: Pulmonary Arteries: Pulmonary arteries are within normal limits in size. . Suboptimal opacification of the pulmonary arteries. No filling defect is identified in the pulmonary arteries to the level of thelobar arteries. Mediastinum: Heart is borderline enlarged. No pericardial effusion. Motion degradation precludes evaluation of the aortic root and ascending aorta. The descending thoracic aorta is within normal limits in size. No luminal defect is appreciated in the descending thoracic aorta. Inter atrial closure device noted. Lungs/pleura: Central airways are patent. Ground-glass the confluent airspace disease noted. Scattered nodular airspace disease noted. No pleural effusion. No pneumothorax. Soft Tissues/Bones: No adenopathy. Calcified lymph nodes in left hilum. Upper Abdomen: Limited images of the upper abdomen are unremarkable. 1. Multifocal airspace disease, including scattered nodular airspace disease. Correlate with presentation. Follow-up to resolution recommended. 2. Suboptimal opacification of the pulmonary arteries. No acute pulmonary embolism to the lobar arteries given limitation. 3. Other findings as described. IR PICC WO SQ PORT/PUMP > 5 YEARS    Result Date: 8/18/2021  EXAMINATION: LIMITED ULTRASOUND OF THE ARM FOR PICC ACCESS, 8/18/2021 TECHNIQUE: The PICC team used ultrasound guidance to place a PICC line. COMPARISON: 08/17/2021. HISTORY: ORDERING SYSTEM PROVIDED HISTORY: iv abx TECHNOLOGIST PROVIDED HISTORY: Reason for exam:->iv abx How many lumens are being requested?->2 What site is the preferred site? ->No preference What side should this line be placed? ->Either FLUOROSCOPY DOSE AND TYPE OR TIME AND EXPOSURES: None. FINDINGS: Ultrasound images demonstrate patency of the left basilic vein which was used for access for placement of a PICC by the PICC team, without a radiologist present. Left basilic vein measures 1.39 cm in transverse diameter in the region of the access site. AP view of the chest was obtained following placement of the left upper extremity PICC line which demonstrates distal tip of the PICC line in the region of junction of superior vena cava and right atrium. There is faint visualization of device overlying the rightward aspect of the cardiopericardial silhouette related to PFO closure device. There is increasing left basilar pleuroparenchymal disease representing combination of pleural fluid and parenchymal disease/atelectasis.   There is persistent right basilar parenchymal disease/atelectasis which is generally similar in appearance when compared to the study of 08/17/2021. Faint fibronodular densities again identified in the lateral aspect of the right mid lung zone. By report, a 46 cm 5.5 Tamazight dual lumen PICC line was placed. Successful placement of PICC line. CT CHEST ABDOMEN PELVIS W CONTRAST    Result Date: 8/18/2021  EXAMINATION: CT OF THE CHEST, ABDOMEN, AND PELVIS WITH CONTRAST 8/18/2021 11:04 am TECHNIQUE: CT of the chest, abdomen and pelvis was performed with the administration of intravenous contrast. Multiplanar reformatted images are provided for review. Dose modulation, iterative reconstruction, and/or weight based adjustment of the mA/kV was utilized to reduce the radiation dose to as low as reasonably achievable. COMPARISON: 08/15/2021 HISTORY: ORDERING SYSTEM PROVIDED HISTORY: elevated LFTs, pna w effusion TECHNOLOGIST PROVIDED HISTORY: Reason for exam:->elevated LFTs, pna w effusion Additional Contrast?->Oral FINDINGS: Chest: Mediastinum: The central airways are patent. There is no hilar or mediastinal adenopathy. Lungs/pleura: Dense consolidation has significantly progressed throughout the left lower lobe with central air bronchograms. A small to moderate-sized multiloculated left pleural effusion is now noted. There is patchy consolidation within the right middle lobe and right posterior costophrenic angle. Several small noncalcified right-sided pleural based soft tissue nodules are have doubled in size over the interval with the largest now measuring 11 mm. Soft Tissues/Bones: There is no acute fracture or aggressive osseous lesion. Abdomen/Pelvis: Organs: The liver, pancreas, spleen, kidneys and adrenals are unremarkable. GI/Bowel: There is no bowel dilatation, wall thickening or obstruction. Pelvis: The bladder and prostate are unremarkable. Peritoneum/Retroperitoneum: There is no free air, free fluid or intraperitoneal inflammatory change.   There is no adenopathy. Bones/Soft Tissues: There is no acute fracture or aggressive osseous lesion. 1. Significant worsening of left lower lobe pneumonia with developing loculated parapneumonic effusion. 2. Several rapidly enlarging right-sided subpleural pulmonary nodules. Septic emboli are a diagnostic consideration. 3. No acute abdominopelvic abnormality. CT GUIDED PLEURAL DRAINAGE W CATH [facility]    Result Date: 8/19/2021  PROCEDURE: CT PERC CATH PLEURAL DRAIN W IMAGIN MODERATE CONSCIOUS SEDATION 8/19/2021 HISTORY: ORDERING SYSTEM PROVIDED HISTORY: Loculated left effusion, picture of septic emboli TECHNOLOGIST PROVIDED HISTORY: Reason for exam:->Loculated left effusion, picture of septic emboli Which side should the procedure be performed? ->Left Reason for Exam: left lung drainage and tube placement Relevant Medical/Surgical History: no fluoro used TECHNIQUE: Dose modulation, iterative reconstruction, and/or weight based adjustment of the mA/kV was utilized to reduce the radiation dose to as low as reasonably achievable. CONTRAST: None. SEDATION: Moderate conscious sedation with versed and fentanyl, was intravenously administered by a nurse under controlled circumstances. Duration of sedation was approximately 15 minutes. ESTIMATED BLOOD LOSS: Minimal. FLUOROSCOPY DOSE AND TYPE OR TIME AND EXPOSURES: None. DESCRIPTION OF PROCEDURE: Informed consent was obtained after a detailed explanation of the procedure including risks, benefits, and alternatives. Universal protocol was observed. Initial CT evaluation demonstrated a moderate to large left-sided pleural effusion. An appropriate skin entry site was chosen. The skin was sterilely prepped and draped. Local anesthesia achieved with 1% lidocaine. A 10 Wolof locking pigtail drainage catheter was advanced into the inferior portion of the pleural effusion using Trocar technique. The pigtail was formed and locked. There was rapid return of cloudy yellow fluid. The catheter was secured to the skin and placed to Pleur-vac drainage. Samples were sent for culture and sensitivity. Successful CT-guided chest tube placement. Results for Frances Thakur (MRN [id number]) as of 8/22/2021 22:22   Ref. Range 8/17/2021 14:49 8/19/2021 12:40 8/21/2021 21:18   CULTURE, ANAEROBIC Unknown  Rpt    CULTURE, BODY FLUID Unknown  Rpt    CULTURE, BLOOD 1 Unknown Rpt  Rpt   CULTURE, BLOOD 2 Unknown Rpt  Rpt   Culture, Blood 2 Unknown No Growth after 4 days of incubation. No Growth to date. Any change in status will be called. Gram Stain Result Unknown  3+ WBC's (Polymor. .. Body Fluid Culture, Sterile Unknown  No growth 60 to 72 hours        Assessment and plan:  Left empyema. CT-guided chest tube placed by IR, small amount of fluid. Fluid chemistry compatible with complicated parapneumonic effusion/empyema, more likely the latter. Dornase and TPA placed 8/20, repeated today. Has been evaluated by cardiothoracic surgery, awaiting response to fibrinolytic. CXR improved but still large collection of fluid  Patient and family were shown the CT of the chest along with interpretation/implications and options  Patient and family were also shown the serial x-ray chest  Patient's imaging is improving  Will repeat TPA with Pulmozyme instillation in the chest tube today and reassess  CT surgery on board-if patient does not improve may require VATS with decortication  ? Septic emboli. Classic appearance on CT, source unclear. Possibly associated with bacteremia/infective endocarditis. TTE and G[place]EN[place] suggested a small globular echodensity suggestive of vegetation on the left atrial side of the occluder. There was no shunt except for delayed intrapulmonary shunt. Per cardiology and ID  Group B streptococcus bacteremia. Was On high-dose penicillin G which was changed to Zosyn and Zyvox  Leukocytosis.     Patient had significant elevation of WBC count which is trending lower as compared to yesterday  Anemia. Normochromic, normocytic. Mild. ?  Microscopic hematuria. The patient denies any symptoms. Can be seen with endocarditis   CVA, ASD closure. Had no shunt on echocardiogram in May 2021. Abnormal LFT. Unclear etiology  DVT prophylaxis.     As per IM    Discussed with patient, his family and nursing      Electronically signed by:  Arti Palumbo MD    8/22/2021    10:21 PM.

## 2021-08-23 NOTE — PROGRESS NOTES
Infectious Disease Follow up Notes    CC :  Streptococcal bacteremia, empyema       Antibiotics:   Zosyn 3.375 q8 - s 8/21/21  linezolid 600 po BID - s 8/21/21    Admit Date:   8/15/2021  Hospital Day: 9    Subjective:   No recurrence of fever since PM 8/20/21  CT output last 24 hours - 1050  Continued pain associated w chest tube  Note plan for decortication tomorrow     Objective:     Patient Vitals for the past 8 hrs:   BP Temp Temp src Pulse Resp SpO2   08/23/21 0745 117/77 97.5 °F (36.4 °C) Oral 100 20 93 %       EXAM:  General:  A&O, NAD     HEENT:  NCAT, PERRL, sclera anicteric   NECK:   Supple, symmetric   LUNGS:   Non-labored breathing.   CT x1 in place     EXT:  No focal rash exposed skin         LINE: PICC LUE in place, site ok          Scheduled Meds:   lidocaine  1 patch Transdermal Daily    piperacillin-tazobactam  3,375 mg Intravenous Q8H    linezolid  600 mg Oral 2 times per day    alteplase (ACTIVASE) syringe  10 mg Intrapleural Once    And    dornase (PULMOZYME) syringe  5 mg Intrapleural Once    metoprolol tartrate  25 mg Oral BID    enoxaparin  40 mg Subcutaneous Daily    docusate sodium  100 mg Oral Daily    atorvastatin  40 mg Oral Nightly    aspirin EC  81 mg Oral Daily    sodium chloride flush  5-40 mL Intravenous 2 times per day       Continuous Infusions:   sodium chloride      sodium chloride Stopped (08/20/21 1207)          Data Review:    Lab Results   Component Value Date    WBC 23.0 (H) 08/23/2021    HGB 10.2 (L) 08/23/2021    HCT 29.9 (L) 08/23/2021    MCV 88.5 08/23/2021     (H) 08/23/2021     Lab Results   Component Value Date    CREATININE 1.0 08/22/2021    BUN 14 08/22/2021     (L) 08/22/2021    K 4.4 08/22/2021    CL 95 (L) 08/22/2021    CO2 26 08/22/2021       Hepatic Function Panel:   Lab Results   Component Value Date    ALKPHOS 175 08/21/2021    ALT 38 08/21/2021    AST 24 08/21/2021    PROT 6.8 08/21/2021    PROT 7.0 03/07/2010    BILITOT 1.2 08/21/2021    BILIDIR 0.6 08/21/2021    IBILI 0.6 08/21/2021    LABALBU 2.8 08/21/2021         MICRO:  8/14     BC #1  S pyogenes              BC #2 neg              Throat GABHS screen +               COVID NAAT neg   8/16     COVID NAAT neg                     COVID PCR neg   8/17 BC x2 neg   8/19 Pleural fluid cultures NGTD, GS 3+wbc,no organisms seen  8/21 BC x2 NGTD         IMAGING:  CT chest 8/15/21  Impression   1. Multifocal airspace disease, including scattered nodular airspace disease. Correlate with presentation.  Follow-up to resolution recommended. 2. Suboptimal opacification of the pulmonary arteries.  No acute pulmonary   embolism to the lobar arteries given limitation. 3. Other findings as described.      CT neck 8/16/21 negative     CT L spine negative    CT CAP 8/18/21  Impression   1. Significant worsening of left lower lobe pneumonia with developing   loculated parapneumonic effusion. 2. Several rapidly enlarging right-sided subpleural pulmonary nodules. Septic emboli are a diagnostic consideration. 3. No acute abdominopelvic abnormality. CT chest 8/23/21  Impression   Small left hydropneumothorax, with chest tube in place extending to the left   costophrenic angle.       Partial consolidation of the left lower lobe is again seen, compatible with   atelectasis or pneumonia.       Noncalcified pulmonary nodules are similar to prior, for which continued CT   follow-up to resolution is recommended.        Assessment:     Patient Active Problem List    Diagnosis Date Noted    Loculated pleural effusion     Septic embolism (HCC)     Atelectasis of left lung     Abnormal LFTs     Former smoker     Marijuana smoker     Pharyngitis due to Streptococcus species 08/16/2021    S/P patent foramen ovale closure 08/16/2021    Streptococcal bacteremia 08/15/2021    Sebaceous cyst     Closed displaced fracture of base of fourth metacarpal bone of right hand 06/27/2019    Closed displaced fracture of body of hamate of right wrist 06/27/2019    PFO (patent foramen ovale)     Cerebrovascular accident (CVA) ([facility] [address] 03/11/2019    Leukocytosis 03/11/2019    Head ache 03/11/2019    Acute medial meniscal tear 07/29/2014    ACL tear 07/29/2014        S pyogenes bacteremia, +BC on 8/14/21  Acute throat/neck pain with +throat GABHS test  Septic pulmonary emboli and empyema   S/p CT placement 8/19/21 with serial fibrinolytic therapy  Pleural fluid studies c/w empyema. GS no organisms seen. Recent BC are negative to date. GARDENIA no vegetation.     -CTS plans VATS, decort   -continue linezolid, Zosyn for now  -anticipate continued IV abx after DC    Elevated LFTs  Liver appears normal on CT   -continue to monitor      History of PFO s/p repair    GARDENIA discussed with Cardiology - lesion on the closure device in L atrium most likely the post of the device   No valvular vegetations seen.           Discussed with patient/family, all questions answered        Donis Hardwick MD  Phone: [phone]   Fax : [phone]

## 2021-08-24 ENCOUNTER — ANESTHESIA (OUTPATIENT)
Dept: OPERATING ROOM | Age: 36
DRG: 853 | End: 2021-08-24
Payer: COMMERCIAL

## 2021-08-24 ENCOUNTER — ANESTHESIA EVENT (OUTPATIENT)
Dept: OPERATING ROOM | Age: 36
DRG: 853 | End: 2021-08-24
Payer: COMMERCIAL

## 2021-08-24 ENCOUNTER — APPOINTMENT (OUTPATIENT)
Dept: GENERAL RADIOLOGY | Age: 36
DRG: 853 | End: 2021-08-24
Payer: COMMERCIAL

## 2021-08-24 VITALS — SYSTOLIC BLOOD PRESSURE: 133 MMHG | OXYGEN SATURATION: 100 % | DIASTOLIC BLOOD PRESSURE: 75 MMHG

## 2021-08-24 LAB
ANAEROBIC CULTURE: NORMAL
HCT VFR BLD CALC: 26.1 % (ref 40.5–52.5)
HCT VFR BLD CALC: 27.2 % (ref 40.5–52.5)
HEMOGLOBIN: 8.8 G/DL (ref 13.5–17.5)
HEMOGLOBIN: 9.5 G/DL (ref 13.5–17.5)
MCH RBC QN AUTO: 29.7 PG (ref 26–34)
MCH RBC QN AUTO: 30.5 PG (ref 26–34)
MCHC RBC AUTO-ENTMCNC: 33.8 G/DL (ref 31–36)
MCHC RBC AUTO-ENTMCNC: 35 G/DL (ref 31–36)
MCV RBC AUTO: 87 FL (ref 80–100)
MCV RBC AUTO: 88.1 FL (ref 80–100)
PDW BLD-RTO: 14.3 % (ref 12.4–15.4)
PDW BLD-RTO: 14.6 % (ref 12.4–15.4)
PLATELET # BLD: 746 K/UL (ref 135–450)
PLATELET # BLD: 785 K/UL (ref 135–450)
PMV BLD AUTO: 6.3 FL (ref 5–10.5)
PMV BLD AUTO: 6.6 FL (ref 5–10.5)
RBC # BLD: 2.96 M/UL (ref 4.2–5.9)
RBC # BLD: 3.13 M/UL (ref 4.2–5.9)
WBC # BLD: 23.4 K/UL (ref 4–11)
WBC # BLD: 28.8 K/UL (ref 4–11)

## 2021-08-24 PROCEDURE — 87116 MYCOBACTERIA CULTURE: CPT

## 2021-08-24 PROCEDURE — 2580000003 HC RX 258: Performed by: THORACIC SURGERY (CARDIOTHORACIC VASCULAR SURGERY)

## 2021-08-24 PROCEDURE — 87206 SMEAR FLUORESCENT/ACID STAI: CPT

## 2021-08-24 PROCEDURE — 6360000002 HC RX W HCPCS: Performed by: THORACIC SURGERY (CARDIOTHORACIC VASCULAR SURGERY)

## 2021-08-24 PROCEDURE — 87076 CULTURE ANAEROBE IDENT EACH: CPT

## 2021-08-24 PROCEDURE — 6360000002 HC RX W HCPCS: Performed by: NURSE ANESTHETIST, CERTIFIED REGISTERED

## 2021-08-24 PROCEDURE — 87205 SMEAR GRAM STAIN: CPT

## 2021-08-24 PROCEDURE — 0W9B40Z DRAINAGE OF LEFT PLEURAL CAVITY WITH DRAINAGE DEVICE, PERCUTANEOUS ENDOSCOPIC APPROACH: ICD-10-PCS | Performed by: THORACIC SURGERY (CARDIOTHORACIC VASCULAR SURGERY)

## 2021-08-24 PROCEDURE — 6360000002 HC RX W HCPCS: Performed by: ANESTHESIOLOGY

## 2021-08-24 PROCEDURE — 87015 SPECIMEN INFECT AGNT CONCNTJ: CPT

## 2021-08-24 PROCEDURE — 87185 SC STD ENZYME DETCJ PER NZM: CPT

## 2021-08-24 PROCEDURE — 87077 CULTURE AEROBIC IDENTIFY: CPT

## 2021-08-24 PROCEDURE — 2580000003 HC RX 258: Performed by: INTERNAL MEDICINE

## 2021-08-24 PROCEDURE — 85027 COMPLETE CBC AUTOMATED: CPT

## 2021-08-24 PROCEDURE — 2500000003 HC RX 250 WO HCPCS: Performed by: NURSE ANESTHETIST, CERTIFIED REGISTERED

## 2021-08-24 PROCEDURE — 7100000000 HC PACU RECOVERY - FIRST 15 MIN: Performed by: THORACIC SURGERY (CARDIOTHORACIC VASCULAR SURGERY)

## 2021-08-24 PROCEDURE — 6360000002 HC RX W HCPCS: Performed by: INTERNAL MEDICINE

## 2021-08-24 PROCEDURE — 71045 X-RAY EXAM CHEST 1 VIEW: CPT

## 2021-08-24 PROCEDURE — 94640 AIRWAY INHALATION TREATMENT: CPT

## 2021-08-24 PROCEDURE — 3600000018 HC SURGERY OHS ADDTL 15MIN: Performed by: THORACIC SURGERY (CARDIOTHORACIC VASCULAR SURGERY)

## 2021-08-24 PROCEDURE — 36415 COLL VENOUS BLD VENIPUNCTURE: CPT

## 2021-08-24 PROCEDURE — 87075 CULTR BACTERIA EXCEPT BLOOD: CPT

## 2021-08-24 PROCEDURE — 2580000003 HC RX 258: Performed by: NURSE ANESTHETIST, CERTIFIED REGISTERED

## 2021-08-24 PROCEDURE — 6370000000 HC RX 637 (ALT 250 FOR IP): Performed by: THORACIC SURGERY (CARDIOTHORACIC VASCULAR SURGERY)

## 2021-08-24 PROCEDURE — 3600000008 HC SURGERY OHS BASE: Performed by: THORACIC SURGERY (CARDIOTHORACIC VASCULAR SURGERY)

## 2021-08-24 PROCEDURE — 87102 FUNGUS ISOLATION CULTURE: CPT

## 2021-08-24 PROCEDURE — 99231 SBSQ HOSP IP/OBS SF/LOW 25: CPT | Performed by: INTERNAL MEDICINE

## 2021-08-24 PROCEDURE — 32652 THORACOSCOPY REM TOTL CORTEX: CPT | Performed by: THORACIC SURGERY (CARDIOTHORACIC VASCULAR SURGERY)

## 2021-08-24 PROCEDURE — 7100000001 HC PACU RECOVERY - ADDTL 15 MIN: Performed by: THORACIC SURGERY (CARDIOTHORACIC VASCULAR SURGERY)

## 2021-08-24 PROCEDURE — 6370000000 HC RX 637 (ALT 250 FOR IP): Performed by: INTERNAL MEDICINE

## 2021-08-24 PROCEDURE — C1729 CATH, DRAINAGE: HCPCS | Performed by: THORACIC SURGERY (CARDIOTHORACIC VASCULAR SURGERY)

## 2021-08-24 PROCEDURE — 94669 MECHANICAL CHEST WALL OSCILL: CPT

## 2021-08-24 PROCEDURE — 2709999900 HC NON-CHARGEABLE SUPPLY: Performed by: THORACIC SURGERY (CARDIOTHORACIC VASCULAR SURGERY)

## 2021-08-24 PROCEDURE — 3700000001 HC ADD 15 MINUTES (ANESTHESIA): Performed by: THORACIC SURGERY (CARDIOTHORACIC VASCULAR SURGERY)

## 2021-08-24 PROCEDURE — 3700000000 HC ANESTHESIA ATTENDED CARE: Performed by: THORACIC SURGERY (CARDIOTHORACIC VASCULAR SURGERY)

## 2021-08-24 PROCEDURE — 2720000010 HC SURG SUPPLY STERILE: Performed by: THORACIC SURGERY (CARDIOTHORACIC VASCULAR SURGERY)

## 2021-08-24 PROCEDURE — 6360000002 HC RX W HCPCS

## 2021-08-24 PROCEDURE — 2500000003 HC RX 250 WO HCPCS: Performed by: THORACIC SURGERY (CARDIOTHORACIC VASCULAR SURGERY)

## 2021-08-24 PROCEDURE — 0BNG4ZZ RELEASE LEFT UPPER LUNG LOBE, PERCUTANEOUS ENDOSCOPIC APPROACH: ICD-10-PCS | Performed by: THORACIC SURGERY (CARDIOTHORACIC VASCULAR SURGERY)

## 2021-08-24 PROCEDURE — 2000000000 HC ICU R&B

## 2021-08-24 PROCEDURE — 6370000000 HC RX 637 (ALT 250 FOR IP): Performed by: NURSE PRACTITIONER

## 2021-08-24 PROCEDURE — 87070 CULTURE OTHR SPECIMN AEROBIC: CPT

## 2021-08-24 RX ORDER — KETOROLAC TROMETHAMINE 30 MG/ML
15 INJECTION, SOLUTION INTRAMUSCULAR; INTRAVENOUS EVERY 6 HOURS
Status: COMPLETED | OUTPATIENT
Start: 2021-08-24 | End: 2021-08-25

## 2021-08-24 RX ORDER — MORPHINE SULFATE 4 MG/ML
4 INJECTION, SOLUTION INTRAMUSCULAR; INTRAVENOUS
Status: DISCONTINUED | OUTPATIENT
Start: 2021-08-24 | End: 2021-08-28 | Stop reason: HOSPADM

## 2021-08-24 RX ORDER — SODIUM CHLORIDE 9 MG/ML
INJECTION, SOLUTION INTRAVENOUS CONTINUOUS PRN
Status: DISCONTINUED | OUTPATIENT
Start: 2021-08-24 | End: 2021-08-24 | Stop reason: SDUPTHER

## 2021-08-24 RX ORDER — MIDAZOLAM HYDROCHLORIDE 1 MG/ML
INJECTION INTRAMUSCULAR; INTRAVENOUS PRN
Status: DISCONTINUED | OUTPATIENT
Start: 2021-08-24 | End: 2021-08-24 | Stop reason: SDUPTHER

## 2021-08-24 RX ORDER — SODIUM CHLORIDE 0.9 % (FLUSH) 0.9 %
10 SYRINGE (ML) INJECTION PRN
Status: DISCONTINUED | OUTPATIENT
Start: 2021-08-24 | End: 2021-08-28 | Stop reason: HOSPADM

## 2021-08-24 RX ORDER — MEPERIDINE HYDROCHLORIDE 50 MG/ML
12.5 INJECTION INTRAMUSCULAR; INTRAVENOUS; SUBCUTANEOUS EVERY 5 MIN PRN
Status: DISCONTINUED | OUTPATIENT
Start: 2021-08-24 | End: 2021-08-25

## 2021-08-24 RX ORDER — PROPOFOL 10 MG/ML
INJECTION, EMULSION INTRAVENOUS PRN
Status: DISCONTINUED | OUTPATIENT
Start: 2021-08-24 | End: 2021-08-24 | Stop reason: SDUPTHER

## 2021-08-24 RX ORDER — OXYCODONE HYDROCHLORIDE AND ACETAMINOPHEN 5; 325 MG/1; MG/1
1 TABLET ORAL PRN
Status: ACTIVE | OUTPATIENT
Start: 2021-08-24 | End: 2021-08-24

## 2021-08-24 RX ORDER — MORPHINE SULFATE 2 MG/ML
2 INJECTION, SOLUTION INTRAMUSCULAR; INTRAVENOUS EVERY 5 MIN PRN
Status: DISCONTINUED | OUTPATIENT
Start: 2021-08-24 | End: 2021-08-25

## 2021-08-24 RX ORDER — OXYCODONE HYDROCHLORIDE 5 MG/1
10 TABLET ORAL EVERY 4 HOURS PRN
Status: DISCONTINUED | OUTPATIENT
Start: 2021-08-24 | End: 2021-08-28 | Stop reason: HOSPADM

## 2021-08-24 RX ORDER — MORPHINE SULFATE 2 MG/ML
1 INJECTION, SOLUTION INTRAMUSCULAR; INTRAVENOUS EVERY 5 MIN PRN
Status: DISCONTINUED | OUTPATIENT
Start: 2021-08-24 | End: 2021-08-25

## 2021-08-24 RX ORDER — DEXTROSE, SODIUM CHLORIDE, AND POTASSIUM CHLORIDE 5; .45; .15 G/100ML; G/100ML; G/100ML
INJECTION INTRAVENOUS CONTINUOUS
Status: DISCONTINUED | OUTPATIENT
Start: 2021-08-24 | End: 2021-08-25

## 2021-08-24 RX ORDER — ROCURONIUM BROMIDE 10 MG/ML
INJECTION, SOLUTION INTRAVENOUS PRN
Status: DISCONTINUED | OUTPATIENT
Start: 2021-08-24 | End: 2021-08-24 | Stop reason: SDUPTHER

## 2021-08-24 RX ORDER — LABETALOL HYDROCHLORIDE 5 MG/ML
5 INJECTION, SOLUTION INTRAVENOUS EVERY 10 MIN PRN
Status: DISCONTINUED | OUTPATIENT
Start: 2021-08-24 | End: 2021-08-25

## 2021-08-24 RX ORDER — LIDOCAINE HYDROCHLORIDE 20 MG/ML
INJECTION, SOLUTION INFILTRATION; PERINEURAL PRN
Status: DISCONTINUED | OUTPATIENT
Start: 2021-08-24 | End: 2021-08-24 | Stop reason: SDUPTHER

## 2021-08-24 RX ORDER — ONDANSETRON 8 MG/1
4 TABLET, ORALLY DISINTEGRATING ORAL EVERY 8 HOURS PRN
Status: DISCONTINUED | OUTPATIENT
Start: 2021-08-24 | End: 2021-08-28 | Stop reason: HOSPADM

## 2021-08-24 RX ORDER — HYDRALAZINE HYDROCHLORIDE 20 MG/ML
5 INJECTION INTRAMUSCULAR; INTRAVENOUS EVERY 10 MIN PRN
Status: DISCONTINUED | OUTPATIENT
Start: 2021-08-24 | End: 2021-08-25

## 2021-08-24 RX ORDER — ALBUTEROL SULFATE 2.5 MG/3ML
2.5 SOLUTION RESPIRATORY (INHALATION)
Status: DISCONTINUED | OUTPATIENT
Start: 2021-08-24 | End: 2021-08-26

## 2021-08-24 RX ORDER — POLYETHYLENE GLYCOL 3350 17 G/17G
17 POWDER, FOR SOLUTION ORAL DAILY
Status: DISCONTINUED | OUTPATIENT
Start: 2021-08-24 | End: 2021-08-28 | Stop reason: HOSPADM

## 2021-08-24 RX ORDER — PHENYLEPHRINE HCL IN 0.9% NACL 1 MG/10 ML
SYRINGE (ML) INTRAVENOUS PRN
Status: DISCONTINUED | OUTPATIENT
Start: 2021-08-24 | End: 2021-08-24 | Stop reason: SDUPTHER

## 2021-08-24 RX ORDER — DEXAMETHASONE SODIUM PHOSPHATE 4 MG/ML
INJECTION, SOLUTION INTRA-ARTICULAR; INTRALESIONAL; INTRAMUSCULAR; INTRAVENOUS; SOFT TISSUE PRN
Status: DISCONTINUED | OUTPATIENT
Start: 2021-08-24 | End: 2021-08-24 | Stop reason: SDUPTHER

## 2021-08-24 RX ORDER — OXYCODONE HYDROCHLORIDE AND ACETAMINOPHEN 5; 325 MG/1; MG/1
2 TABLET ORAL PRN
Status: ACTIVE | OUTPATIENT
Start: 2021-08-24 | End: 2021-08-24

## 2021-08-24 RX ORDER — OXYCODONE HYDROCHLORIDE 5 MG/1
5 TABLET ORAL EVERY 4 HOURS PRN
Status: DISCONTINUED | OUTPATIENT
Start: 2021-08-24 | End: 2021-08-28 | Stop reason: HOSPADM

## 2021-08-24 RX ORDER — FENTANYL CITRATE 50 UG/ML
INJECTION, SOLUTION INTRAMUSCULAR; INTRAVENOUS PRN
Status: DISCONTINUED | OUTPATIENT
Start: 2021-08-24 | End: 2021-08-24 | Stop reason: SDUPTHER

## 2021-08-24 RX ORDER — SODIUM CHLORIDE 9 MG/ML
25 INJECTION, SOLUTION INTRAVENOUS PRN
Status: DISCONTINUED | OUTPATIENT
Start: 2021-08-24 | End: 2021-08-28 | Stop reason: HOSPADM

## 2021-08-24 RX ORDER — PROMETHAZINE HYDROCHLORIDE 25 MG/ML
6.25 INJECTION, SOLUTION INTRAMUSCULAR; INTRAVENOUS
Status: DISCONTINUED | OUTPATIENT
Start: 2021-08-24 | End: 2021-08-25

## 2021-08-24 RX ORDER — ONDANSETRON 2 MG/ML
4 INJECTION INTRAMUSCULAR; INTRAVENOUS EVERY 6 HOURS PRN
Status: DISCONTINUED | OUTPATIENT
Start: 2021-08-24 | End: 2021-08-28 | Stop reason: HOSPADM

## 2021-08-24 RX ORDER — KETOROLAC TROMETHAMINE 30 MG/ML
INJECTION, SOLUTION INTRAMUSCULAR; INTRAVENOUS PRN
Status: DISCONTINUED | OUTPATIENT
Start: 2021-08-24 | End: 2021-08-24 | Stop reason: SDUPTHER

## 2021-08-24 RX ORDER — ONDANSETRON 2 MG/ML
INJECTION INTRAMUSCULAR; INTRAVENOUS PRN
Status: DISCONTINUED | OUTPATIENT
Start: 2021-08-24 | End: 2021-08-24 | Stop reason: SDUPTHER

## 2021-08-24 RX ORDER — MORPHINE SULFATE 2 MG/ML
2 INJECTION, SOLUTION INTRAMUSCULAR; INTRAVENOUS
Status: DISCONTINUED | OUTPATIENT
Start: 2021-08-24 | End: 2021-08-28 | Stop reason: HOSPADM

## 2021-08-24 RX ORDER — KETAMINE HYDROCHLORIDE 100 MG/ML
INJECTION, SOLUTION INTRAMUSCULAR; INTRAVENOUS PRN
Status: DISCONTINUED | OUTPATIENT
Start: 2021-08-24 | End: 2021-08-24 | Stop reason: SDUPTHER

## 2021-08-24 RX ORDER — DIPHENHYDRAMINE HYDROCHLORIDE 50 MG/ML
12.5 INJECTION INTRAMUSCULAR; INTRAVENOUS
Status: ACTIVE | OUTPATIENT
Start: 2021-08-24 | End: 2021-08-24

## 2021-08-24 RX ORDER — ONDANSETRON 2 MG/ML
4 INJECTION INTRAMUSCULAR; INTRAVENOUS PRN
Status: DISCONTINUED | OUTPATIENT
Start: 2021-08-24 | End: 2021-08-25

## 2021-08-24 RX ORDER — SODIUM CHLORIDE 0.9 % (FLUSH) 0.9 %
10 SYRINGE (ML) INJECTION EVERY 12 HOURS SCHEDULED
Status: DISCONTINUED | OUTPATIENT
Start: 2021-08-24 | End: 2021-08-28 | Stop reason: HOSPADM

## 2021-08-24 RX ORDER — ACETAMINOPHEN 325 MG/1
650 TABLET ORAL EVERY 4 HOURS PRN
Status: DISCONTINUED | OUTPATIENT
Start: 2021-08-24 | End: 2021-08-28 | Stop reason: HOSPADM

## 2021-08-24 RX ADMIN — SODIUM CHLORIDE, PRESERVATIVE FREE 10 ML: 5 INJECTION INTRAVENOUS at 07:49

## 2021-08-24 RX ADMIN — SUGAMMADEX 200 MG: 100 INJECTION, SOLUTION INTRAVENOUS at 16:15

## 2021-08-24 RX ADMIN — PIPERACILLIN AND TAZOBACTAM 3375 MG: 3; .375 INJECTION, POWDER, LYOPHILIZED, FOR SOLUTION INTRAVENOUS at 05:20

## 2021-08-24 RX ADMIN — PIPERACILLIN AND TAZOBACTAM 3375 MG: 3; .375 INJECTION, POWDER, LYOPHILIZED, FOR SOLUTION INTRAVENOUS at 18:51

## 2021-08-24 RX ADMIN — PIPERACILLIN AND TAZOBACTAM 3375 MG: 3; .375 INJECTION, POWDER, LYOPHILIZED, FOR SOLUTION INTRAVENOUS at 11:22

## 2021-08-24 RX ADMIN — FAMOTIDINE 20 MG: 10 INJECTION, SOLUTION INTRAVENOUS at 21:25

## 2021-08-24 RX ADMIN — MORPHINE SULFATE 4 MG: 4 INJECTION INTRAVENOUS at 21:21

## 2021-08-24 RX ADMIN — Medication 200 MCG: at 15:48

## 2021-08-24 RX ADMIN — Medication 200 MCG: at 15:44

## 2021-08-24 RX ADMIN — ENOXAPARIN SODIUM 40 MG: 40 INJECTION SUBCUTANEOUS at 23:42

## 2021-08-24 RX ADMIN — Medication 200 MCG: at 15:58

## 2021-08-24 RX ADMIN — DOCUSATE SODIUM 100 MG: 100 CAPSULE, LIQUID FILLED ORAL at 07:46

## 2021-08-24 RX ADMIN — FENTANYL CITRATE 100 MCG: 50 INJECTION, SOLUTION INTRAMUSCULAR; INTRAVENOUS at 15:17

## 2021-08-24 RX ADMIN — SODIUM CHLORIDE: 9 INJECTION, SOLUTION INTRAVENOUS at 15:29

## 2021-08-24 RX ADMIN — HYDROMORPHONE HYDROCHLORIDE 1 MG: 2 INJECTION, SOLUTION INTRAMUSCULAR; INTRAVENOUS; SUBCUTANEOUS at 07:42

## 2021-08-24 RX ADMIN — HYDROMORPHONE HYDROCHLORIDE 0.5 MG: 1 INJECTION, SOLUTION INTRAMUSCULAR; INTRAVENOUS; SUBCUTANEOUS at 17:00

## 2021-08-24 RX ADMIN — FENTANYL CITRATE 50 MCG: 50 INJECTION, SOLUTION INTRAMUSCULAR; INTRAVENOUS at 15:45

## 2021-08-24 RX ADMIN — ASPIRIN 81 MG: 81 TABLET, COATED ORAL at 07:46

## 2021-08-24 RX ADMIN — CEFAZOLIN 2000 MG: 10 INJECTION, POWDER, FOR SOLUTION INTRAVENOUS at 23:47

## 2021-08-24 RX ADMIN — HYDROCODONE BITARTRATE AND ACETAMINOPHEN 2 TABLET: 5; 325 TABLET ORAL at 11:19

## 2021-08-24 RX ADMIN — METOPROLOL TARTRATE 25 MG: 25 TABLET, FILM COATED ORAL at 07:46

## 2021-08-24 RX ADMIN — METOPROLOL TARTRATE 25 MG: 25 TABLET, FILM COATED ORAL at 21:24

## 2021-08-24 RX ADMIN — ALBUTEROL SULFATE 2.5 MG: 2.5 SOLUTION RESPIRATORY (INHALATION) at 20:48

## 2021-08-24 RX ADMIN — HYDROMORPHONE HYDROCHLORIDE 0.5 MG: 1 INJECTION, SOLUTION INTRAMUSCULAR; INTRAVENOUS; SUBCUTANEOUS at 17:13

## 2021-08-24 RX ADMIN — FENTANYL CITRATE 50 MCG: 50 INJECTION, SOLUTION INTRAMUSCULAR; INTRAVENOUS at 16:01

## 2021-08-24 RX ADMIN — KETOROLAC TROMETHAMINE 15 MG: 30 INJECTION, SOLUTION INTRAMUSCULAR; INTRAVENOUS at 18:08

## 2021-08-24 RX ADMIN — MIDAZOLAM HYDROCHLORIDE 2 MG: 2 INJECTION, SOLUTION INTRAMUSCULAR; INTRAVENOUS at 15:11

## 2021-08-24 RX ADMIN — ROCURONIUM BROMIDE 50 MG: 10 SOLUTION INTRAVENOUS at 15:17

## 2021-08-24 RX ADMIN — ROCURONIUM BROMIDE 10 MG: 10 SOLUTION INTRAVENOUS at 16:01

## 2021-08-24 RX ADMIN — DEXAMETHASONE SODIUM PHOSPHATE 10 MG: 4 INJECTION, SOLUTION INTRAMUSCULAR; INTRAVENOUS at 15:35

## 2021-08-24 RX ADMIN — MORPHINE SULFATE 4 MG: 4 INJECTION INTRAVENOUS at 18:34

## 2021-08-24 RX ADMIN — SODIUM CHLORIDE, PRESERVATIVE FREE 10 ML: 5 INJECTION INTRAVENOUS at 23:44

## 2021-08-24 RX ADMIN — LINEZOLID 600 MG: 600 TABLET, FILM COATED ORAL at 07:46

## 2021-08-24 RX ADMIN — LINEZOLID 600 MG: 600 TABLET, FILM COATED ORAL at 21:24

## 2021-08-24 RX ADMIN — OXYCODONE 10 MG: 5 TABLET ORAL at 18:53

## 2021-08-24 RX ADMIN — HYDROMORPHONE HYDROCHLORIDE 0.5 MG: 1 INJECTION, SOLUTION INTRAMUSCULAR; INTRAVENOUS; SUBCUTANEOUS at 16:42

## 2021-08-24 RX ADMIN — LIDOCAINE HYDROCHLORIDE 60 MG: 20 INJECTION, SOLUTION INFILTRATION; PERINEURAL at 15:17

## 2021-08-24 RX ADMIN — MORPHINE SULFATE 2 MG: 2 INJECTION, SOLUTION INTRAMUSCULAR; INTRAVENOUS at 18:10

## 2021-08-24 RX ADMIN — KETOROLAC TROMETHAMINE 15 MG: 30 INJECTION, SOLUTION INTRAMUSCULAR; INTRAVENOUS at 23:42

## 2021-08-24 RX ADMIN — PROPOFOL 200 MG: 10 INJECTION, EMULSION INTRAVENOUS at 15:17

## 2021-08-24 RX ADMIN — SODIUM CHLORIDE: 9 INJECTION, SOLUTION INTRAVENOUS at 15:10

## 2021-08-24 RX ADMIN — ONDANSETRON 4 MG: 2 INJECTION INTRAMUSCULAR; INTRAVENOUS at 16:11

## 2021-08-24 RX ADMIN — KETOROLAC TROMETHAMINE 15 MG: 30 INJECTION, SOLUTION INTRAMUSCULAR; INTRAVENOUS at 15:41

## 2021-08-24 RX ADMIN — HYDROMORPHONE HYDROCHLORIDE 0.5 MG: 1 INJECTION, SOLUTION INTRAMUSCULAR; INTRAVENOUS; SUBCUTANEOUS at 16:50

## 2021-08-24 RX ADMIN — MORPHINE SULFATE 2 MG: 2 INJECTION, SOLUTION INTRAMUSCULAR; INTRAVENOUS at 17:33

## 2021-08-24 RX ADMIN — HYDROCODONE BITARTRATE AND ACETAMINOPHEN 1 TABLET: 5; 325 TABLET ORAL at 05:25

## 2021-08-24 RX ADMIN — KETAMINE HYDROCHLORIDE 50 MG: 100 INJECTION, SOLUTION INTRAMUSCULAR; INTRAVENOUS at 15:17

## 2021-08-24 ASSESSMENT — PAIN SCALES - GENERAL
PAINLEVEL_OUTOF10: 9
PAINLEVEL_OUTOF10: 6
PAINLEVEL_OUTOF10: 0
PAINLEVEL_OUTOF10: 7
PAINLEVEL_OUTOF10: 7
PAINLEVEL_OUTOF10: 9
PAINLEVEL_OUTOF10: 0
PAINLEVEL_OUTOF10: 9
PAINLEVEL_OUTOF10: 7
PAINLEVEL_OUTOF10: 7
PAINLEVEL_OUTOF10: 6
PAINLEVEL_OUTOF10: 0
PAINLEVEL_OUTOF10: 8
PAINLEVEL_OUTOF10: 7
PAINLEVEL_OUTOF10: 0
PAINLEVEL_OUTOF10: 7
PAINLEVEL_OUTOF10: 10

## 2021-08-24 ASSESSMENT — PULMONARY FUNCTION TESTS
PIF_VALUE: 29
PIF_VALUE: 28
PIF_VALUE: 24
PIF_VALUE: 24
PIF_VALUE: 29
PIF_VALUE: 28
PIF_VALUE: 9
PIF_VALUE: 29
PIF_VALUE: 29
PIF_VALUE: 13
PIF_VALUE: 31
PIF_VALUE: 28
PIF_VALUE: 24
PIF_VALUE: 32
PIF_VALUE: 28
PIF_VALUE: 29
PIF_VALUE: 7
PIF_VALUE: 26
PIF_VALUE: 29
PIF_VALUE: 8
PIF_VALUE: 29
PIF_VALUE: 28
PIF_VALUE: 29
PIF_VALUE: 24
PIF_VALUE: 11
PIF_VALUE: 22
PIF_VALUE: 29
PIF_VALUE: 29
PIF_VALUE: 24
PIF_VALUE: 28
PIF_VALUE: 26
PIF_VALUE: 11
PIF_VALUE: 22
PIF_VALUE: 29
PIF_VALUE: 32
PIF_VALUE: 19
PIF_VALUE: 7
PIF_VALUE: 30
PIF_VALUE: 29
PIF_VALUE: 24
PIF_VALUE: 29
PIF_VALUE: 29
PIF_VALUE: 28
PIF_VALUE: 29
PIF_VALUE: 9
PIF_VALUE: 28
PIF_VALUE: 22
PIF_VALUE: 18
PIF_VALUE: 29
PIF_VALUE: 24
PIF_VALUE: 28
PIF_VALUE: 28
PIF_VALUE: 29
PIF_VALUE: 29
PIF_VALUE: 28
PIF_VALUE: 6
PIF_VALUE: 22
PIF_VALUE: 28
PIF_VALUE: 2
PIF_VALUE: 24
PIF_VALUE: 28
PIF_VALUE: 31
PIF_VALUE: 36
PIF_VALUE: 22
PIF_VALUE: 2
PIF_VALUE: 22
PIF_VALUE: 2
PIF_VALUE: 22

## 2021-08-24 ASSESSMENT — PAIN DESCRIPTION - PAIN TYPE
TYPE: ACUTE PAIN;SURGICAL PAIN
TYPE: ACUTE PAIN

## 2021-08-24 ASSESSMENT — PAIN SCALES - WONG BAKER: WONGBAKER_NUMERICALRESPONSE: 6;8

## 2021-08-24 ASSESSMENT — PAIN DESCRIPTION - DESCRIPTORS: DESCRIPTORS: ACHING

## 2021-08-24 ASSESSMENT — PAIN DESCRIPTION - LOCATION
LOCATION: BACK;RIB CAGE
LOCATION: INCISION

## 2021-08-24 ASSESSMENT — PAIN DESCRIPTION - ORIENTATION: ORIENTATION: LEFT

## 2021-08-24 ASSESSMENT — PAIN DESCRIPTION - FREQUENCY: FREQUENCY: CONTINUOUS

## 2021-08-24 NOTE — PROGRESS NOTES
INPATIENT PULMONARY CRITICAL CARE PROGRESS NOTE      Reason for visit: Group B streptococcal bacteremia, strep throat, left loculated effusion/empyema, findings suggestive of septic emboli prior PFO closure. SUBJECTIVE: Patient still has some chest pain, patient has had some coughing but no expectoration, no hemoptysis,, patient was afebrile and hemodynamically maintained when seen, patient has sinus rhythm on the monitor which is ranging from normal sinus rhythm to sinus tachycardia, patient when seen was not hypoxemic and was on room air oxygen with saturation 95%, no other pertinent review of system of concern      Physical Exam:  Blood pressure 138/79, pulse 108, temperature 98.6 °F (37 °C), temperature source Oral, resp. rate 22, height 6' (1.829 m), weight 179 lb 3.7 oz (81.3 kg), SpO2 95 %.'   Constitutional: Pleasant, averagely built, not ill-appearing. In minimal pain due to chest tube  HENT: Oropharynx is clear and moist.  Class II airway, no dental lesions. Eyes:  Conjunctivae are normal.  No scleral icterus. Neck: No JVD. Cardiovascular: Normal rate, regular rhythm, normal heart sounds. Pulmonary/Chest: No wheezes. No rales. Air entry significantly reduced left hemithorax, chest tube present  Abdominal: Deferred. Musculoskeletal: No cyanosis. No clubbing. No obvious joint deformity. Lymphadenopathy: No cervical or supraclavicular adenopathy. Skin: Skin is warm and dry. No rash or nodules on the exposed extremities. Psychiatric: Normal mood and affect. Behavior is normal.  No anxiety. Neurologic: Alert, awake and oriented. PERRL. Speech fluent.   No obvious neurologic deficit, detailed exam not performed      Results:  CBC:   Recent Labs     08/22/21  1154 08/23/21  0631 08/24/21  0600   WBC 27.6* 23.0* 23.4*   HGB 11.7* 10.2* 9.5*   HCT 34.2* 29.9* 27.2*   MCV 87.2 88.5 87.0   * 665* 785*     BMP:   Recent Labs     08/22/21  1154   *   K 4.4   CL 95*   CO2 26   BUN 14 CREATININE 1.0         Imaging:  I have reviewed radiology images personally. XR CHEST (2 VW)   Final Result   Unchanged left basilar hydropneumothorax. CT CHEST WO CONTRAST   Final Result   Small left hydropneumothorax, with chest tube in place extending to the left   costophrenic angle. Partial consolidation of the left lower lobe is again seen, compatible with   atelectasis or pneumonia. Noncalcified pulmonary nodules are similar to prior, for which continued CT   follow-up to resolution is recommended. RECOMMENDATIONS:   Fleischner Society guidelines for follow-up and management of incidentally   detected pulmonary nodules:      Single Solid Nodule:      Nodule size less than 6 mm   In a low-risk patient, no routine follow-up. In a high-risk patient, optional CT at 12 months. Nodule size equals 6-8 mm   In a low-risk patient, CT at 6-12 months, then consider CT at 18-24 months. In a high-risk patient, CT at 6-12 months, then CT at 18-24 months. Nodule size greater than 8 mm         In a low-risk patient, consider CT at 3 months, PET/CT, or tissue sampling. In a high-risk patient, consider CT at 3 months, PET/CT, or tissue sampling. Multiple Solid Nodules:      Nodule size less than 6 mm   In a low-risk patient, no routine follow-up. In a high-risk patient, optional CT at 12 months. Nodule size equals 6-8 mm   In a low-risk patient, CT at 3-6 months, then consider CT at 18-24 months. In a high-risk patient, CT at 3-6 months, then CT at 18-24 months. Nodule size greater than 8 mm   In a low-risk patient, CT at 3-6 months, then consider CT at 18-24 months. In a high-risk patient, CT at 3-6 months, then CT at 18-24 months. - Low risk patients include individuals with minimal or absent history of   smoking and other known risk factors. - High risk patients include individuals with a history or smoking or known   risk factors.       Radiology 2017 http://pubs. rsna.org/doi/full/10.1148/radiol. 2599135822         XR CHEST (2 VW)   Final Result   Improving but incompletely resolved left basilar empyema. XR CHEST (2 VW)   Final Result   Moderate left empyema, minimally decreased from prior. XR CHEST PORTABLE   Final Result   Moderate left pleural effusion, increased as compared to prior. Bibasilar atelectasis or airspace disease. CT [facility]   Final Result   Successful CT-guided chest tube placement. IR PICC WO SQ PORT/PUMP > 5 YEARS   Final Result   Successful placement of PICC line. CT CHEST ABDOMEN PELVIS W CONTRAST   Final Result   1. Significant worsening of left lower lobe pneumonia with developing   loculated parapneumonic effusion. 2. Several rapidly enlarging right-sided subpleural pulmonary nodules. Septic emboli are a diagnostic consideration. 3. No acute abdominopelvic abnormality. XR CHEST (2 VW)   Final Result   Increased bibasilar airspace disease with small left-sided pleural effusion         CT SOFT TISSUE NECK WO CONTRAST   Final Result   No acute abnormality of the soft tissue structures of the neck. CT CHEST PULMONARY EMBOLISM W CONTRAST   Final Result   1. Multifocal airspace disease, including scattered nodular airspace disease. Correlate with presentation. Follow-up to resolution recommended. 2. Suboptimal opacification of the pulmonary arteries. No acute pulmonary   embolism to the lobar arteries given limitation. 3. Other findings as described. Patient had a repeat CT of the chest done as ordered by CT surgery-  CT OF THE CHEST WITHOUT CONTRAST 8/23/2021 8:01 am       TECHNIQUE:   CT of the chest was performed without the administration of intravenous   contrast. Multiplanar reformatted images are provided for review.  Dose   modulation, iterative reconstruction, and/or weight based adjustment of the   mA/kV was utilized to reduce the radiation dose to as low as reasonably   achievable.       COMPARISON:   None.       HISTORY:   ORDERING SYSTEM PROVIDED HISTORY: evaluate parapneumonic effusion/loculations   s/p TPA/Dornase   TECHNOLOGIST PROVIDED HISTORY:   Reason for exam:->evaluate parapneumonic effusion/loculations s/p TPA/Dornase   Reason for Exam: F/U , NO NEW SYPTOMS   Acuity: Chronic   Type of Exam: Subsequent/Follow-up       FINDINGS:   Mediastinum: Calcification of the thoracic aorta.  Left upper extremity PICC   line is demonstrated.  Calcified left hilar lymph nodes, in keeping with   granulomatous disease.  Radiopaque device is seen at the interatrial septum. Trace pericardial effusion.  No axillary adenopathy.  Visualized portion of   thyroid is symmetric.       Lungs/pleura: Pigtail chest tube is seen on the left extending to the left   costophrenic angle.  Small loculated left hydropneumothorax is demonstrated,   with small air component.  A small amount of fluid tracks to the left major   fissure.  Partial consolidation of the left lower lobe is again seen adjacent   to the effusion with air bronchograms.  1.5 cm left paramediastinal nodule or   node is again demonstrated posterior to the aortic arch.  Right-sided   pulmonary nodules measuring up to approximately 1.1 cm, similar to prior.  No   right pneumothorax.  Parenchymal bands at the right base, likely atelectasis.       Upper Abdomen: Calcified granulomas within the spleen.       Soft Tissues/Bones: No acute process in the uppermost abdomen.           Impression   Small left hydropneumothorax, with chest tube in place extending to the left   costophrenic angle.       Partial consolidation of the left lower lobe is again seen, compatible with   atelectasis or pneumonia.       Noncalcified pulmonary nodules are similar to prior, for which continued CT   follow-up to resolution is recommended           Assessment and plan:  Left empyema.    CT-guided chest tube placed by IR, small amount of fluid. Fluid chemistry compatible with complicated parapneumonic effusion/empyema, status post instillation of TPA and Pulmozyme with no significant clinical or radiographic improvement as per the CT of the chest  Tentative VATS with decortication today  ? Septic emboli. Classic appearance on CT, source unclear. Possibly associated with bacteremia/infective endocarditis. TTE and GARDENIA suggested a small globular echodensity suggestive of vegetation on the left atrial side of the occluder. There was no shunt except for delayed intrapulmonary shunt. Per cardiology and ID  Group B streptococcus bacteremia. Was On high-dose penicillin G which was changed to Zosyn and Zyvox  Leukocytosis. Patient had significant elevation of WBC count which is trending lower as compared to yesterday  Anemia. Normochromic, normocytic. Mild. ?  Microscopic hematuria. The patient denies any symptoms. Can be seen with endocarditis   CVA, ASD closure. Had no shunt on echocardiogram in May 2021. Abnormal LFT. Unclear etiology  DVT prophylaxis.     As per IM    Discussed with patient and nursing    Further management as per CT surgery after the surgery     Electronically signed by:  Smita Winters MD    8/24/2021    10:45 AM.

## 2021-08-24 NOTE — PROGRESS NOTES
Hospitalist Progress Note      PCP: Donn Rocha MD    Chief Complaint. Patient is a 60-year-old male who presented to hospital for positive blood cultures. Date of Admission: 8/15/2021    Subjective: feels better, No SOB at rest, while walking otherwise denies chest pain, nausea, vomiting, fever or chills. Medications:  Reviewed    Infusion Medications    sodium chloride      sodium chloride Stopped (08/20/21 1207)     Scheduled Medications    lidocaine  1 patch Transdermal Daily    piperacillin-tazobactam  3,375 mg IntraVENous Q8H    linezolid  600 mg Oral 2 times per day    alteplase (ACTIVASE) syringe  10 mg Intrapleural Once    And    dornase (PULMOZYME) syringe  5 mg Intrapleural Once    metoprolol tartrate  25 mg Oral BID    [Held by provider] enoxaparin  40 mg Subcutaneous Daily    docusate sodium  100 mg Oral Daily    atorvastatin  40 mg Oral Nightly    aspirin EC  81 mg Oral Daily    sodium chloride flush  5-40 mL IntraVENous 2 times per day     PRN Meds: meperidine, HYDROmorphone, HYDROmorphone, morphine, morphine, oxyCODONE-acetaminophen **OR** oxyCODONE-acetaminophen, ondansetron, promethazine, diphenhydrAMINE, labetalol, hydrALAZINE, HYDROcodone 5 mg - acetaminophen **OR** HYDROcodone 5 mg - acetaminophen, HYDROmorphone, hydrALAZINE, perflutren lipid microspheres, sodium chloride flush, sodium chloride, ondansetron **OR** ondansetron, polyethylene glycol, acetaminophen **OR** acetaminophen, benzocaine-menthol, perflutren lipid microspheres      Intake/Output Summary (Last 24 hours) at 8/24/2021 1702  Last data filed at 8/24/2021 1638  Gross per 24 hour   Intake 760 ml   Output 3440 ml   Net -2680 ml       Physical Exam Performed:    BP (!) 143/87   Pulse 111   Temp 97.7 °F (36.5 °C) (Temporal)   Resp 23   Ht 6' (1.829 m)   Wt 179 lb 3.7 oz (81.3 kg)   SpO2 94%   BMI 24.31 kg/m²     General appearance: NAD  HEENT:  Conjunctivae/corneas clear.   Neck: Supple, with full range of motion. Respiratory:  Normal respiratory effort. Clear to auscultation, bilaterally without Rales/Wheezes/Rhonchi. Cardiovascular: Regular rate and rhythm with normal S1/S2 without murmurs or rubs  Abdomen: Soft, non-tender, non-distended, normal bowel sounds. Musculoskeletal: No cyanosis or edema bilaterally  Neurologic:  without any focal sensory/motor deficits. grossly non-focal.  Psychiatric: Alert and oriented, Normal mood  Peripheral Pulses: +2 palpable, equal bilaterally       Labs:   Recent Labs     08/22/21  1154 08/23/21  0631 08/24/21  0600   WBC 27.6* 23.0* 23.4*   HGB 11.7* 10.2* 9.5*   HCT 34.2* 29.9* 27.2*   * 665* 785*     Recent Labs     08/22/21  1154   *   K 4.4   CL 95*   CO2 26   BUN 14   CREATININE 1.0   CALCIUM 8.5     No results for input(s): AST, ALT, BILIDIR, BILITOT, ALKPHOS in the last 72 hours. No results for input(s): INR in the last 72 hours. No results for input(s): Ivette Rickey in the last 72 hours. Urinalysis:      Lab Results   Component Value Date    NITRU Negative 08/14/2021    WBCUA 6-9 08/14/2021    BACTERIA 3+ 08/14/2021    RBCUA 11-20 08/14/2021    BLOODU MODERATE 08/14/2021    SPECGRAV 1.025 08/14/2021    GLUCOSEU Negative 08/14/2021       Radiology:  XR CHEST (2 VW)   Final Result   Unchanged left basilar hydropneumothorax. CT CHEST WO CONTRAST   Final Result   Small left hydropneumothorax, with chest tube in place extending to the left   costophrenic angle. Partial consolidation of the left lower lobe is again seen, compatible with   atelectasis or pneumonia. Noncalcified pulmonary nodules are similar to prior, for which continued CT   follow-up to resolution is recommended. RECOMMENDATIONS:   Fleischner Society guidelines for follow-up and management of incidentally   detected pulmonary nodules:      Single Solid Nodule:      Nodule size less than 6 mm   In a low-risk patient, no routine follow-up.    In a high-risk patient, optional CT at 12 months. Nodule size equals 6-8 mm   In a low-risk patient, CT at 6-12 months, then consider CT at 18-24 months. In a high-risk patient, CT at 6-12 months, then CT at 18-24 months. Nodule size greater than 8 mm         In a low-risk patient, consider CT at 3 months, PET/CT, or tissue sampling. In a high-risk patient, consider CT at 3 months, PET/CT, or tissue sampling. Multiple Solid Nodules:      Nodule size less than 6 mm   In a low-risk patient, no routine follow-up. In a high-risk patient, optional CT at 12 months. Nodule size equals 6-8 mm   In a low-risk patient, CT at 3-6 months, then consider CT at 18-24 months. In a high-risk patient, CT at 3-6 months, then CT at 18-24 months. Nodule size greater than 8 mm   In a low-risk patient, CT at 3-6 months, then consider CT at 18-24 months. In a high-risk patient, CT at 3-6 months, then CT at 18-24 months. - Low risk patients include individuals with minimal or absent history of   smoking and other known risk factors. - High risk patients include individuals with a history or smoking or known   risk factors. Radiology 2017 http://pubs. rsna.org/doi/full/10.1148/radiol. 5447009006         XR CHEST (2 VW)   Final Result   Improving but incompletely resolved left basilar empyema. XR CHEST (2 VW)   Final Result   Moderate left empyema, minimally decreased from prior. XR CHEST PORTABLE   Final Result   Moderate left pleural effusion, increased as compared to prior. Bibasilar atelectasis or airspace disease. CT GUIDED PLEURAL DRAINAGE W [facility]   Final Result   Successful CT-guided chest tube placement. IR PICC WO SQ PORT/PUMP > 5 YEARS   Final Result   Successful placement of PICC line. CT CHEST ABDOMEN PELVIS W CONTRAST   Final Result   1.  Significant worsening of left lower lobe pneumonia with developing   loculated parapneumonic effusion. 2. Several rapidly enlarging right-sided subpleural pulmonary nodules. Septic emboli are a diagnostic consideration. 3. No acute abdominopelvic abnormality. XR CHEST (2 VW)   Final Result   Increased bibasilar airspace disease with small left-sided pleural effusion         CT SOFT TISSUE NECK WO CONTRAST   Final Result   No acute abnormality of the soft tissue structures of the neck. CT CHEST PULMONARY EMBOLISM W CONTRAST   Final Result   1. Multifocal airspace disease, including scattered nodular airspace disease. Correlate with presentation. Follow-up to resolution recommended. 2. Suboptimal opacification of the pulmonary arteries. No acute pulmonary   embolism to the lobar arteries given limitation. 3. Other findings as described. Assessment/Plan:    Active Hospital Problems    Diagnosis     Loculated pleural effusion [J90]     Septic embolism (HCC) [I76]     Atelectasis of left lung [J98.11]     Abnormal LFTs [R94.5]     Former smoker [Z87.891]     Marijuana smoker [F12.90]     Pharyngitis due to Streptococcus species [J02.0]     S/P patent foramen ovale closure [Z87.74]     Streptococcal bacteremia [R78.81, B95.5]     Cerebrovascular accident (CVA) [facility]) [I63.9]        Patient is a 61-year-old male who presented to hospital for positive blood cultures.     Assessment  Sepsis secondary to empyema  Strep bacteremia  Elevated LFTs  Hypertension  Hyperlipidemia  History of CVA    Plan  Continue on Zosyn, Zyvox per ID following  Plan for decortication surgery per cardiothoracic surgery, timing to be determined  Pain control  Blood cultures negative to date  We will check LFTs tomorrow, BMP, CBC  Continue medications including aspirin, statin, Lopressor  On IV fluid therapy with normal saline, can DC tomorrow after the procedure if patient continues to tolerate diet  Diet: Diet NPO Exceptions are: Sips of Water with Meds  Code Status: Full Code    PT/OT Eval Status: ordered    Dispo -continue IV antibiotics, plan for VATS today per cardiothoracic    Chuckie Chery MD

## 2021-08-24 NOTE — ANESTHESIA POSTPROCEDURE EVALUATION
Department of Anesthesiology  Postprocedure Note    Patient: Arpan Krishna  MRN: [id number]  YOB: 1985  Date of evaluation: 8/24/2021  Time:  7:58 PM     Procedure Summary     Date: 08/24/21 Room / Location: [facility] [address] [place] [address]    Anesthesia Start: 9544 Anesthesia Stop: 8785    Procedure: LEFT VIDEO ASSISTED THORACOSCOPIC SURGERY, DECORTICATION AND DRAINAGE OF LUNG ABCESS WITH CHEST TUBE PLACEMENT (Left ) Diagnosis: (-)    Surgeons: Selene Richard MD Responsible Provider: Uli Robins MD    Anesthesia Type: general ASA Status: 3          Anesthesia Type: No value filed. Ismael Phase I: Ismael Score: 9    Ismael Phase II:      Last vitals: Reviewed and per EMR flowsheets.        Anesthesia Post Evaluation    Comments: Postoperative Anesthesia Note    Name:    Arpan Krishna  MRN:      [id number]    Patient Vitals in the past 12 hrs:  08/24/21 1818, BP:(!) 133/91, Pulse:90, Resp:15, SpO2:100 %  08/24/21 1814, BP:135/89, Pulse:95, Resp:16, SpO2:99 %  08/24/21 1809, BP:(!) 136/95, Pulse:82, Resp:18, SpO2:99 %  08/24/21 1804, BP:(!) 135/94, Pulse:88, Resp:12, SpO2:99 %  08/24/21 1759, BP:(!) 143/91, Pulse:92, Resp:16, SpO2:100 %  08/24/21 1754, Pulse:95, Resp:15, SpO2:100 %  08/24/21 1749, BP:(!) 141/92, Pulse:90, Resp:13, SpO2:99 %  08/24/21 1744, BP:(!) 140/86, Pulse:98, Resp:24, SpO2:98 %  08/24/21 1739, BP:(!) 132/90, Pulse:96, Resp:24, SpO2:99 %  08/24/21 1734, BP:(!) 140/83, Pulse:98, Resp:21, SpO2:99 %  08/24/21 1729, BP:139/78, Pulse:96, Resp:20, SpO2:99 %  08/24/21 1724, BP:138/87, Pulse:92, Resp:17, SpO2:97 %  08/24/21 1719, BP:120/81, Pulse:93, Resp:17, SpO2:98 %  08/24/21 1714, BP:139/89, Pulse:95, Resp:17, SpO2:98 %  08/24/21 1709, Pulse:92, Resp:16, SpO2:98 %  08/24/21 1704, Pulse:94, Resp:22, SpO2:98 %  08/24/21 1659, BP:(!) 140/86, Pulse:99, Resp:24, SpO2:97 %  08/24/21 1654, BP:138/89, Pulse:100, Resp:25, SpO2:96 %  08/24/21 1649, BP:133/87, Pulse:104, Resp:25, SpO2:96 %  08/24/21 1644, BP:138/88, Pulse:108, Resp:23, SpO2:94 %  08/24/21 1639, BP:(!) 139/93, Pulse:114, Resp:(!) 38, SpO2:93 %  08/24/21 1634, BP:(!) 143/87, Pulse:119, Resp:(!) 33, SpO2:92 %  08/24/21 1629, BP:(!) 143/87, Temp:97.7 °F (36.5 °C), Temp src:Temporal, Pulse:111, Resp:23  08/24/21 1114, BP:132/83, Temp:98 °F (36.7 °C), Temp src:Oral, Pulse:95, Resp:18, SpO2:94 %     LABS:    CBC  Lab Results       Component                Value               Date/Time                  WBC                      28.8 (H)            08/24/2021 04:57 PM        HGB                      8.8 (L)             08/24/2021 04:57 PM        HCT                      26.1 (L)            08/24/2021 04:57 PM        PLT                      746 (H)             08/24/2021 04:57 PM   RENAL  Lab Results       Component                Value               Date/Time                  NA                       131 (L)             08/22/2021 11:54 AM        K                        4.4                 08/22/2021 11:54 AM        K                        3.6                 08/16/2021 06:16 AM        CL                       95 (L)              08/22/2021 11:54 AM        CO2                      26                  08/22/2021 11:54 AM        BUN                      14                  08/22/2021 11:54 AM        CREATININE               1.0                 08/22/2021 11:54 AM        GLUCOSE                  135 (H)             08/22/2021 11:54 AM   COAGS  Lab Results       Component                Value               Date/Time                  PROTIME                  13.9 (H)            08/18/2021 01:04 PM        INR                      1.22 (H)            08/18/2021 01:04 PM     Intake & Output:  @98XNWS@    Nausea & Vomiting:  No    Level of Consciousness:  Awake    Pain Assessment:  Adequate analgesia    Anesthesia Complications:  No apparent anesthetic complications    SUMMARY      Vital signs stable  OK to discharge from Stage I post anesthesia care.   Care transferred from Anesthesiology department on discharge from perioperative area

## 2021-08-24 NOTE — ANESTHESIA PRE PROCEDURE
Department of Anesthesiology  Preprocedure Note       Name:  Lukas Escobar   Age:  30 y.o.  :  1985                                          MRN:  [id number]         Date:  2021      Surgeon: Kayleigh Gallegos):  Caleb Gomez MD    Procedure: Procedure(s):  LEFT VIDEO ASSISTED THORACOSCOPIC SURGERY, DECORTICATION AND CHEST TUBE PLACEMENT    Medications prior to admission:   Prior to Admission medications    Medication Sig Start Date End Date Taking?  Authorizing Provider   aspirin EC 81 MG EC tablet Take 1 tablet by mouth daily 11/3/20  Yes Gloria Edgar MD   atorvastatin (LIPITOR) 40 MG tablet Take 1 tablet by mouth nightly 19  Yes Gloria Edgar MD       Current medications:    Current Facility-Administered Medications   Medication Dose Route Frequency Provider Last Rate Last Admin    HYDROcodone-acetaminophen ([address]) 5-325 MG per tablet 1 tablet  1 tablet Oral Q4H PRN Bhavna Mcallister APRN - CNP   1 tablet at 21 0711    Or    HYDROcodone-acetaminophen (NORCO) 5-325 MG per tablet 2 tablet  2 tablet Oral Q4H PRN Bhavna [place] APRN - CNP   2 tablet at 21 1119    lidocaine 4 % external patch 1 patch  1 patch Transdermal Daily Bhavna [place] APRN - CNP   1 patch at 21 0748    piperacillin-tazobactam (ZOSYN) 3,375 mg in dextrose 5 % 50 mL IVPB extended infusion (mini-bag)  3,375 mg IntraVENous Q8H Qing Meza MD 12.5 mL/hr at 21 1122 3,375 mg at 21 1122    linezolid (ZYVOX) tablet 600 mg  600 mg Oral 2 times per day Qing Meza MD   600 mg at 21 0746    alteplase (CATHFLO) 10 mg in sodium chloride 0.9 % 30 mL  10 mg Intrapleural Once Mj Reddy MD        And    dornase alpha (PULMOZYME) 5 mg in sterile water 30 mL  5 mg Intrapleural Once Mj Reddy MD        HYDROmorphone (DILAUDID) injection 1 mg  1 mg IntraVENous Q3H PRN Ankit Qiu MD   1 mg at 21 0742    metoprolol tartrate (LOPRESSOR) tablet 25 mg  25 mg Oral BID Mace Olivo Rosanne Guerin MD   25 mg at 08/24/21 0746    hydrALAZINE (APRESOLINE) injection 10 mg  10 mg IntraVENous Q4H PRN Steven Garcia MD        [Held by provider] enoxaparin (LOVENOX) injection 40 mg  40 mg Subcutaneous Daily Keisha Paez MD   40 mg at 08/23/21 0830    docusate sodium (COLACE) capsule 100 mg  100 mg Oral Daily Steven Garcia MD   100 mg at 08/24/21 0746    perflutren lipid microspheres (DEFINITY) injection 1.65 mg  1.5 mL IntraVENous ONCE PRN Ginger Canales MD        atorvastatin (LIPITOR) tablet 40 mg  40 mg Oral Nightly Yuki Knutson MD   40 mg at 08/23/21 2033    aspirin EC tablet 81 mg  81 mg Oral Daily Yuki Knutson MD   81 mg at 08/24/21 0746    sodium chloride flush 0.9 % injection 5-40 mL  5-40 mL IntraVENous 2 times per day Yuki Knutson MD   10 mL at 08/24/21 0749    sodium chloride flush 0.9 % injection 5-40 mL  5-40 mL IntraVENous PRN Yuki Knutson MD   10 mL at 08/22/21 0237    0.9 % sodium chloride infusion  25 mL IntraVENous PRN Yuki Knutson MD        ondansetron (ZOFRAN-ODT) disintegrating tablet 4 mg  4 mg Oral Q8H PRN Yuki Knutson MD        Or    ondansetron [facility]) injection 4 mg  4 mg IntraVENous Q6H PRN Yuki Knutson MD   4 mg at 08/19/21 1139    polyethylene glycol (GLYCOLAX) packet 17 g  17 g Oral Daily PRN Yuki Knutson MD        acetaminophen (TYLENOL) tablet 650 mg  650 mg Oral Q6H PRN Yuki Knutson MD   650 mg at 08/20/21 1721    Or    acetaminophen (TYLENOL) suppository 650 mg  650 mg Rectal Q6H PRN Yuki Knutson MD        0.9 % sodium chloride infusion   IntraVENous Continuous Yuki Knutson MD   Stopped at 08/20/21 1207    benzocaine-menthol (CEPACOL SORE THROAT) lozenge 1 lozenge  1 lozenge Oral Q2H PRN Lexie Solomon APRN - CNP   1 lozenge at 08/17/21 1251    perflutren lipid microspheres (DEFINITY) injection 1.65 mg  1.5 mL IntraVENous ONCE PRN Ginger Canales MD Allergies:  No Known Allergies    Problem List:    Patient Active Problem List   Diagnosis Code    Acute medial meniscal tear S83.249A    ACL tear S80.80A    Cerebrovascular accident (CVA) ([facility] [address] I63.9    Leukocytosis D72.829    Head ache R51.9    PFO (patent foramen ovale) Q21.1    Closed displaced fracture of base of fourth metacarpal bone of right hand S62.314A    Closed displaced fracture of body of hamate of right wrist S62.141A    Sebaceous cyst L72.3    Streptococcal bacteremia R78.81, B95.5    Pharyngitis due to Streptococcus species J02.0    S/P patent foramen ovale closure Z87.74    Loculated pleural effusion J90    Septic embolism (HCC) I76    Atelectasis of left lung J98.11    Abnormal LFTs R94.5    Former smoker Z87.891    Marijuana smoker F12.90       Past Medical History:        Diagnosis Date    CVA (cerebral vascular accident) ([facility] [address] 2019    no residuals EXCEPT PERIPHERAL RT SIDE VISION    Hernia     History of bone graft     PFO (patent foramen ovale) 2019    PFO closure with 25 mm PFO occluder device    TMJ (dislocation of temporomandibular joint)        Past Surgical History:        Procedure Laterality Date    ANTERIOR CRUCIATE LIGAMENT REPAIR Left 09/10/2014    BONE GRAFT      left wrist    CARDIAC SURGERY  2019    PFO  plACED AFTER STROKE    CT INSERT CATH PLEURA W IMAGE  2021    CT INSERT CATH PLEURA W IMAGE 2021 Tory Oliveira MD [facility] CT SCAN    HAND SURGERY      right thumb pins    PRE-MALIGNANT / BENIGN SKIN LESION EXCISION N/A 2020    SEBACEOUS CYST EXCISION, POSTERIOR SCALP performed by Cherry Singh MD at [address] [place] [address] TRANSESOPHAGEAL ECHOCARDIOGRAM  2021    [place] with Dr Carlos A Artis       Social History:    Social History     Tobacco Use    Smoking status: Former Smoker     Packs/day: 0.33     Types: Cigarettes     Quit date: 3/11/2019     Years since quittin.4    Smokeless tobacco: Former User     Types: Snuff    Tobacco comment: quit 3/2019   Substance Use Topics    Alcohol use: Yes     Alcohol/week: 6.0 standard drinks     Types: 6 Cans of beer per week     Comment: 6 drinks per week                                Counseling given: Not Answered  Comment: quit 3/2019      Vital Signs (Current):   Vitals:    08/24/21 0039 08/24/21 0524 08/24/21 0731 08/24/21 1114   BP: 112/69 121/79 138/79 132/83   Pulse: 107 117 108 95   Resp: 16 22 18   Temp: 98.9 °F (37.2 °C) 99.2 °F (37.3 °C) 98.6 °F (37 °C) 98 °F (36.7 °C)   TempSrc: Oral  Oral Oral   SpO2: 93%  95% 94%   Weight:       Height:                                                  BP Readings from Last 3 Encounters:   08/24/21 132/83   08/19/21 114/71   08/14/21 116/76       NPO Status:                                                                                 BMI:   Wt Readings from Last 3 Encounters:   08/23/21 179 lb 3.7 oz (81.3 kg)   08/14/21 170 lb (77.1 kg)   10/23/20 175 lb (79.4 kg)     Body mass index is 24.31 kg/m². CBC:   Lab Results   Component Value Date    WBC 23.4 08/24/2021    RBC 3.13 08/24/2021    HGB 9.5 08/24/2021    HCT 27.2 08/24/2021    MCV 87.0 08/24/2021    RDW 14.3 08/24/2021     08/24/2021       CMP:   Lab Results   Component Value Date     08/22/2021    K 4.4 08/22/2021    K 3.6 08/16/2021    CL 95 08/22/2021    CO2 26 08/22/2021    BUN 14 08/22/2021    CREATININE 1.0 08/22/2021    GFRAA >60 08/22/2021    GFRAA >60 03/07/2010    AGRATIO 0.7 08/20/2021    LABGLOM >60 08/22/2021    GLUCOSE 135 08/22/2021    PROT 6.8 08/21/2021    PROT 7.0 03/07/2010    CALCIUM 8.5 08/22/2021    BILITOT 1.2 08/21/2021    ALKPHOS 175 08/21/2021    AST 24 08/21/2021    ALT 38 08/21/2021       POC Tests: No results for input(s): POCGLU, POCNA, POCK, POCCL, POCBUN, POCHEMO, POCHCT in the last 72 hours.     Coags:   Lab Results   Component Value Date    PROTIME 13.9 08/18/2021    INR 1.22 08/18/2021       HCG (If Applicable): No results found for: PREGTESTUR, PREGSERUM, HCG, HCGQUANT     ABGs: No results found for: PHART, PO2ART, YSV4QYV, DUU5ZIO, BEART, B8EBAUUY     Type & Screen (If Applicable):  No results found for: LABABO, LABRH    Drug/Infectious Status (If Applicable):  No results found for: HIV, HEPCAB    COVID-19 Screening (If Applicable):   Lab Results   Component Value Date    COVID19 Not Detected 08/16/2021    COVID19 Not Detected 08/16/2021    COVID19 NOT DETECTED 01/22/2021           Anesthesia Evaluation  Patient summary reviewed and Nursing notes reviewed no history of anesthetic complications:   Airway: Mallampati: II     Neck ROM: full   Dental:          Pulmonary:Negative Pulmonary ROS and normal exam                               Cardiovascular:Negative CV ROS                   ROS comment: PFO s/p occulsion ? infection     Neuro/Psych:   Negative Neuro/Psych ROS  (+) CVA:, headaches:,             GI/Hepatic/Renal: Neg GI/Hepatic/Renal ROS       (-) hiatal hernia and GERD       Endo/Other: Negative Endo/Other ROS                    Abdominal:             Vascular: Other Findings:           Anesthesia Plan      general     ASA 3     (I discussed with the patient the risks and benefits of PIV, general anesthesia, IV Narcotics, PACU. All questions were answered the patient agrees with the plan and wishes to proceed.  )  Induction: intravenous.                           John Saenz MD   8/24/2021

## 2021-08-24 NOTE — PROGRESS NOTES
INPATIENT PULMONARY CRITICAL CARE PROGRESS NOTE      Reason for visit: Group B streptococcal bacteremia, strep throat, left loculated effusion/empyema, findings suggestive of septic emboli prior PFO closure. SUBJECTIVE: Patient underwent a third installation of TPA and Pulmozyme in the chest tube yesterday, patient had about 1 L of bloody fluid from the chest tube, patient was complaining of increased chest pain, patient's cumulative fluid balance was -7.2 L, patient does not have any cough expectoration or hemoptysis, patient did not have any wheezing, patient was afebrile and hemodynamically maintained when seen, patient has sinus rhythm on the monitor which is ranging from normal sinus rhythm to sinus tachycardia, patient when seen was not hypoxemic and was on room air oxygen with saturation 94%, no other pertinent review of system of concern      Physical Exam:  Blood pressure 122/77, pulse 98, temperature 98.8 °F (37.1 °C), resp. rate 22, height 6' (1.829 m), weight 179 lb 3.7 oz (81.3 kg), SpO2 95 %.'   Constitutional: Pleasant, averagely built, not ill-appearing. In minimal pain due to chest tube  HENT: Oropharynx is clear and moist.  Class II airway, no dental lesions. Eyes:  Conjunctivae are normal.  No scleral icterus. Neck: No JVD. Cardiovascular: Normal rate, regular rhythm, normal heart sounds. Pulmonary/Chest: No wheezes. No rales. Air entry significantly reduced left hemithorax, chest tube present  Abdominal: Deferred. Musculoskeletal: No cyanosis. No clubbing. No obvious joint deformity. Lymphadenopathy: No cervical or supraclavicular adenopathy. Skin: Skin is warm and dry. No rash or nodules on the exposed extremities. Psychiatric: Normal mood and affect. Behavior is normal.  No anxiety. Neurologic: Alert, awake and oriented. PERRL. Speech fluent.   No obvious neurologic deficit, detailed exam not performed      Results:  CBC:   Recent Labs     08/21/21  0522 08/22/21  1154 08/23/21  0631   WBC 33.5* 27.6* 23.0*   HGB 12.6* 11.7* 10.2*   HCT 37.2* 34.2* 29.9*   MCV 88.0 87.2 88.5   * 672* 665*     BMP:   Recent Labs     08/21/21  0522 08/22/21  1154   * 131*   K 4.8 4.4   CL 96* 95*   CO2 26 26   PHOS 5.0*  --    BUN 11 14   CREATININE 0.9 1.0     LIVER PROFILE:   Recent Labs     08/21/21  0522   AST 24   ALT 38   BILIDIR 0.6*   BILITOT 1.2*   ALKPHOS 175*     PT/INR:   No results for input(s): PROTIME, INR in the last 72 hours. Imaging:  I have reviewed radiology images personally. XR CHEST (2 VW)   Final Result   Unchanged left basilar hydropneumothorax. CT CHEST WO CONTRAST   Final Result   Small left hydropneumothorax, with chest tube in place extending to the left   costophrenic angle. Partial consolidation of the left lower lobe is again seen, compatible with   atelectasis or pneumonia. Noncalcified pulmonary nodules are similar to prior, for which continued CT   follow-up to resolution is recommended. RECOMMENDATIONS:   Fleischner Society guidelines for follow-up and management of incidentally   detected pulmonary nodules:      Single Solid Nodule:      Nodule size less than 6 mm   In a low-risk patient, no routine follow-up. In a high-risk patient, optional CT at 12 months. Nodule size equals 6-8 mm   In a low-risk patient, CT at 6-12 months, then consider CT at 18-24 months. In a high-risk patient, CT at 6-12 months, then CT at 18-24 months. Nodule size greater than 8 mm         In a low-risk patient, consider CT at 3 months, PET/CT, or tissue sampling. In a high-risk patient, consider CT at 3 months, PET/CT, or tissue sampling. Multiple Solid Nodules:      Nodule size less than 6 mm   In a low-risk patient, no routine follow-up. In a high-risk patient, optional CT at 12 months. Nodule size equals 6-8 mm   In a low-risk patient, CT at 3-6 months, then consider CT at 18-24 months.    In a high-risk patient, CT at 3-6 months, then CT at 18-24 months. Nodule size greater than 8 mm   In a low-risk patient, CT at 3-6 months, then consider CT at 18-24 months. In a high-risk patient, CT at 3-6 months, then CT at 18-24 months. - Low risk patients include individuals with minimal or absent history of   smoking and other known risk factors. - High risk patients include individuals with a history or smoking or known   risk factors. Radiology 2017 http://pubs. rsna.org/doi/full/10.1148/radiol. 5607434924         XR CHEST (2 VW)   Final Result   Improving but incompletely resolved left basilar empyema. XR CHEST (2 VW)   Final Result   Moderate left empyema, minimally decreased from prior. XR CHEST PORTABLE   Final Result   Moderate left pleural effusion, increased as compared to prior. Bibasilar atelectasis or airspace disease. CT GUIDED PLEURAL DRAINAGE W CATH [facility]   Final Result   Successful CT-guided chest tube placement. IR PICC WO SQ PORT/PUMP > 5 YEARS   Final Result   Successful placement of PICC line. CT CHEST ABDOMEN PELVIS W CONTRAST   Final Result   1. Significant worsening of left lower lobe pneumonia with developing   loculated parapneumonic effusion. 2. Several rapidly enlarging right-sided subpleural pulmonary nodules. Septic emboli are a diagnostic consideration. 3. No acute abdominopelvic abnormality. XR CHEST (2 VW)   Final Result   Increased bibasilar airspace disease with small left-sided pleural effusion         CT SOFT TISSUE NECK WO CONTRAST   Final Result   No acute abnormality of the soft tissue structures of the neck. CT CHEST PULMONARY EMBOLISM W CONTRAST   Final Result   1. Multifocal airspace disease, including scattered nodular airspace disease. Correlate with presentation. Follow-up to resolution recommended. 2. Suboptimal opacification of the pulmonary arteries.   No acute pulmonary   embolism to the lobar arteries given limitation. 3. Other findings as described. Patient had a repeat CT of the chest done as ordered by CT surgery-  CT OF THE CHEST WITHOUT CONTRAST 8/23/2021 8:01 am       TECHNIQUE:   CT of the chest was performed without the administration of intravenous   contrast. Multiplanar reformatted images are provided for review. Dose   modulation, iterative reconstruction, and/or weight based adjustment of the   mA/kV was utilized to reduce the radiation dose to as low as reasonably   achievable.       COMPARISON:   None.       HISTORY:   ORDERING SYSTEM PROVIDED HISTORY: evaluate parapneumonic effusion/loculations   s/p TPA/Dornase   TECHNOLOGIST PROVIDED HISTORY:   Reason for exam:->evaluate parapneumonic effusion/loculations s/p TPA/Dornase   Reason for Exam: F/U , NO NEW SYPTOMS   Acuity: Chronic   Type of Exam: Subsequent/Follow-up       FINDINGS:   Mediastinum: Calcification of the thoracic aorta.  Left upper extremity PICC   line is demonstrated.  Calcified left hilar lymph nodes, in keeping with   granulomatous disease.  Radiopaque device is seen at the interatrial septum.    Trace pericardial effusion.  No axillary adenopathy.  Visualized portion of   thyroid is symmetric.       Lungs/pleura: Pigtail chest tube is seen on the left extending to the left   costophrenic angle.  Small loculated left hydropneumothorax is demonstrated,   with small air component.  A small amount of fluid tracks to the left major   fissure.  Partial consolidation of the left lower lobe is again seen adjacent   to the effusion with air bronchograms.  1.5 cm left paramediastinal nodule or   node is again demonstrated posterior to the aortic arch.  Right-sided   pulmonary nodules measuring up to approximately 1.1 cm, similar to prior.  No   right pneumothorax.  Parenchymal bands at the right base, likely atelectasis.       Upper Abdomen: Calcified granulomas within the spleen.       Soft Tissues/Bones: No acute process in the uppermost abdomen.           Impression   Small left hydropneumothorax, with chest tube in place extending to the left   costophrenic angle.       Partial consolidation of the left lower lobe is again seen, compatible with   atelectasis or pneumonia.       Noncalcified pulmonary nodules are similar to prior, for which continued CT   follow-up to resolution is recommended           Assessment and plan:  Left empyema. CT-guided chest tube placed by IR, small amount of fluid. Fluid chemistry compatible with complicated parapneumonic effusion/empyema, status post instillation of TPA and Pulmozyme with no significant clinical or radiographic improvement as per the CT of the chest  CT surgery saw the patient again this morning and recommendation is for VATS with decortication in the morning  ? Septic emboli. Classic appearance on CT, source unclear. Possibly associated with bacteremia/infective endocarditis. TTE and GARDENIA suggested a small globular echodensity suggestive of vegetation on the left atrial side of the occluder. There was no shunt except for delayed intrapulmonary shunt. Per cardiology and ID  Group B streptococcus bacteremia. Was On high-dose penicillin G which was changed to Zosyn and Zyvox  Leukocytosis. Patient had significant elevation of WBC count which is trending lower as compared to yesterday  Anemia. Normochromic, normocytic. Mild. ?  Microscopic hematuria. The patient denies any symptoms. Can be seen with endocarditis   CVA, ASD closure. Had no shunt on echocardiogram in May 2021. Abnormal LFT. Unclear etiology  DVT prophylaxis.     As per IM    Discussed with patient and nursing      Electronically signed by:  Hernandez Michael MD    8/23/2021    8:53 PM.

## 2021-08-24 NOTE — PROGRESS NOTES
Patient given PRN Dilaudid for facial grimacing, discomfort to Ctube site and catheter. Patient tense, patient unable to rate pain with number scale when assessed, VS obtained, patient with RR of 34, HR of 116. Will monitor.

## 2021-08-24 NOTE — CARE COORDINATION
Spoke with patient at bedside. He lives at home with his fiance. He is independent at home and works full time. Plan is for VATS/decortication this evening. Patient states he was told he might discharge with IV antibiotics. When questioned him about home care, he states his fiance is a nurse and he feels very confident that she will be able to manage that at home without home care. Will continue to follow. Please notify CM should any additional needs arise.

## 2021-08-24 NOTE — BRIEF OP NOTE
Brief Postoperative Note      Patient: Karen Woodruff  YOB: 1985  MRN: [id number]    Date of Procedure: 8/24/2021    Pre-Op Diagnosis: -    Post-Op Diagnosis: Same       Procedure(s):  LEFT VIDEO ASSISTED THORACOSCOPIC SURGERY, DECORTICATION AND CHEST TUBE PLACEMENT    Surgeon(s):  MD Tod Meyer MD    Assistant:  Surgical Assistant: Darrel Santana    Anesthesia: General    Estimated Blood Loss (mL): 140    Complications: None    Specimens:   ID Type Source Tests Collected by Time Destination   1 : LUNG ABCESS Tissue Lung CULTURE, FUNGUS, CULTURE, SURGICAL, CULTURE, TISSUE, CULTURE WITH SMEAR, ACID FAST [id number] Miguel Mckeon MD 8/24/2021 1604    2 : LUNG ABCESS Body Fluid Fluid CULTURE, ANAEROBIC, CULTURE, FUNGUS, CULTURE, BODY FLUID, CULTURE, SURGICAL, CULTURE WITH SMEAR, ACID FAST [id number] Miguel Mckeon MD 8/24/2021 1606        Implants:  * No implants in log *      Drains:   Chest Tube 1 Left Pleural 10 [place] (Active)   $ Chest tube insertion $ Yes 08/20/21 0555   Suction -20 cm H2O 08/23/21 2021   Chest Tube Airleak No 08/24/21 0812   Drainage Description Dark red 08/24/21 5695   Dressing Status Clean;Dry; Intact 08/24/21 0812   Dressing Type Dry dressing 08/24/21 0812   Dressing Change Due 08/22/21 08/22/21 0551   Site Assessment Not assessed 08/24/21 0812   Surrounding Skin Unable to view 08/24/21 0812   Patency Intervention Tip/Tilt 08/23/21 2021   Output (ml) 350 ml 08/23/21 1833   Measurement at Chest Wall  25.5 08/20/21 1540       Urethral Catheter Latex; Temperature probe 16 fr (Active)       Findings: lung abcess and empyma     Electronically signed by Linda Stratton MD on 8/24/2021 at 4:10 PM

## 2021-08-24 NOTE — PROGRESS NOTES
Pharmacy contacted by phone regarding scheduled IV antibiotics.  Zosyn being brought to PACU, Ancef is being retimed per pharmacist.

## 2021-08-25 ENCOUNTER — APPOINTMENT (OUTPATIENT)
Dept: GENERAL RADIOLOGY | Age: 36
DRG: 853 | End: 2021-08-25
Payer: COMMERCIAL

## 2021-08-25 LAB
A/G RATIO: 0.6 (ref 1.1–2.2)
ALBUMIN SERPL-MCNC: 2.5 G/DL (ref 3.4–5)
ALP BLD-CCNC: 190 U/L (ref 40–129)
ALT SERPL-CCNC: 46 U/L (ref 10–40)
ANION GAP SERPL CALCULATED.3IONS-SCNC: 9 MMOL/L (ref 3–16)
AST SERPL-CCNC: 38 U/L (ref 15–37)
BILIRUB SERPL-MCNC: 0.3 MG/DL (ref 0–1)
BLOOD CULTURE, ROUTINE: NORMAL
BUN BLDV-MCNC: 11 MG/DL (ref 7–20)
CALCIUM SERPL-MCNC: 8.2 MG/DL (ref 8.3–10.6)
CHLORIDE BLD-SCNC: 97 MMOL/L (ref 99–110)
CO2: 26 MMOL/L (ref 21–32)
CREAT SERPL-MCNC: 0.9 MG/DL (ref 0.9–1.3)
CULTURE, BLOOD 2: NORMAL
GFR AFRICAN AMERICAN: >60
GFR NON-AFRICAN AMERICAN: >60
GLOBULIN: 4.4 G/DL
GLUCOSE BLD-MCNC: 155 MG/DL (ref 70–99)
GLUCOSE BLD-MCNC: 172 MG/DL (ref 70–99)
GLUCOSE BLD-MCNC: 172 MG/DL (ref 70–99)
GLUCOSE BLD-MCNC: 204 MG/DL (ref 70–99)
GLUCOSE BLD-MCNC: 236 MG/DL (ref 70–99)
HCT VFR BLD CALC: 23.9 % (ref 40.5–52.5)
HEMOGLOBIN: 8.1 G/DL (ref 13.5–17.5)
MCH RBC QN AUTO: 30.1 PG (ref 26–34)
MCHC RBC AUTO-ENTMCNC: 33.8 G/DL (ref 31–36)
MCV RBC AUTO: 89 FL (ref 80–100)
PDW BLD-RTO: 14.5 % (ref 12.4–15.4)
PERFORMED ON: ABNORMAL
PLATELET # BLD: 725 K/UL (ref 135–450)
PMV BLD AUTO: 6.2 FL (ref 5–10.5)
POTASSIUM REFLEX MAGNESIUM: 4.6 MMOL/L (ref 3.5–5.1)
RBC # BLD: 2.69 M/UL (ref 4.2–5.9)
SODIUM BLD-SCNC: 132 MMOL/L (ref 136–145)
TOTAL PROTEIN: 6.9 G/DL (ref 6.4–8.2)
WBC # BLD: 27.7 K/UL (ref 4–11)

## 2021-08-25 PROCEDURE — 94669 MECHANICAL CHEST WALL OSCILL: CPT

## 2021-08-25 PROCEDURE — 6370000000 HC RX 637 (ALT 250 FOR IP): Performed by: THORACIC SURGERY (CARDIOTHORACIC VASCULAR SURGERY)

## 2021-08-25 PROCEDURE — 99232 SBSQ HOSP IP/OBS MODERATE 35: CPT | Performed by: INTERNAL MEDICINE

## 2021-08-25 PROCEDURE — 2580000003 HC RX 258: Performed by: THORACIC SURGERY (CARDIOTHORACIC VASCULAR SURGERY)

## 2021-08-25 PROCEDURE — 80053 COMPREHEN METABOLIC PANEL: CPT

## 2021-08-25 PROCEDURE — 71045 X-RAY EXAM CHEST 1 VIEW: CPT

## 2021-08-25 PROCEDURE — 99024 POSTOP FOLLOW-UP VISIT: CPT | Performed by: NURSE PRACTITIONER

## 2021-08-25 PROCEDURE — 6360000002 HC RX W HCPCS: Performed by: THORACIC SURGERY (CARDIOTHORACIC VASCULAR SURGERY)

## 2021-08-25 PROCEDURE — 2000000000 HC ICU R&B

## 2021-08-25 PROCEDURE — 94640 AIRWAY INHALATION TREATMENT: CPT

## 2021-08-25 PROCEDURE — 85027 COMPLETE CBC AUTOMATED: CPT

## 2021-08-25 RX ORDER — DEXTROSE MONOHYDRATE 25 G/50ML
12.5 INJECTION, SOLUTION INTRAVENOUS PRN
Status: DISCONTINUED | OUTPATIENT
Start: 2021-08-25 | End: 2021-08-28 | Stop reason: HOSPADM

## 2021-08-25 RX ORDER — DEXTROSE MONOHYDRATE 50 MG/ML
100 INJECTION, SOLUTION INTRAVENOUS PRN
Status: DISCONTINUED | OUTPATIENT
Start: 2021-08-25 | End: 2021-08-28 | Stop reason: HOSPADM

## 2021-08-25 RX ORDER — FAMOTIDINE 20 MG/1
20 TABLET, FILM COATED ORAL 2 TIMES DAILY
Status: DISCONTINUED | OUTPATIENT
Start: 2021-08-25 | End: 2021-08-28 | Stop reason: HOSPADM

## 2021-08-25 RX ORDER — NICOTINE POLACRILEX 4 MG
15 LOZENGE BUCCAL PRN
Status: DISCONTINUED | OUTPATIENT
Start: 2021-08-25 | End: 2021-08-28 | Stop reason: HOSPADM

## 2021-08-25 RX ADMIN — OXYCODONE 10 MG: 5 TABLET ORAL at 00:13

## 2021-08-25 RX ADMIN — KETOROLAC TROMETHAMINE 15 MG: 30 INJECTION, SOLUTION INTRAMUSCULAR; INTRAVENOUS at 05:30

## 2021-08-25 RX ADMIN — ACETAMINOPHEN 650 MG: 325 TABLET ORAL at 08:12

## 2021-08-25 RX ADMIN — MORPHINE SULFATE 2 MG: 2 INJECTION, SOLUTION INTRAMUSCULAR; INTRAVENOUS at 16:53

## 2021-08-25 RX ADMIN — MORPHINE SULFATE 2 MG: 2 INJECTION, SOLUTION INTRAMUSCULAR; INTRAVENOUS at 16:48

## 2021-08-25 RX ADMIN — FAMOTIDINE 20 MG: 20 TABLET ORAL at 20:12

## 2021-08-25 RX ADMIN — ASPIRIN 325 MG: 325 TABLET, COATED ORAL at 08:12

## 2021-08-25 RX ADMIN — OXYCODONE 10 MG: 5 TABLET ORAL at 21:41

## 2021-08-25 RX ADMIN — PIPERACILLIN AND TAZOBACTAM 3375 MG: 3; .375 INJECTION, POWDER, LYOPHILIZED, FOR SOLUTION INTRAVENOUS at 03:17

## 2021-08-25 RX ADMIN — SODIUM CHLORIDE, PRESERVATIVE FREE 10 ML: 5 INJECTION INTRAVENOUS at 08:00

## 2021-08-25 RX ADMIN — FAMOTIDINE 20 MG: 20 TABLET ORAL at 08:12

## 2021-08-25 RX ADMIN — OXYCODONE 10 MG: 5 TABLET ORAL at 08:12

## 2021-08-25 RX ADMIN — KETOROLAC TROMETHAMINE 15 MG: 30 INJECTION, SOLUTION INTRAMUSCULAR; INTRAVENOUS at 11:38

## 2021-08-25 RX ADMIN — OXYCODONE 10 MG: 5 TABLET ORAL at 17:52

## 2021-08-25 RX ADMIN — INSULIN LISPRO 1 UNITS: 100 INJECTION, SOLUTION INTRAVENOUS; SUBCUTANEOUS at 08:28

## 2021-08-25 RX ADMIN — MORPHINE SULFATE 4 MG: 4 INJECTION INTRAVENOUS at 23:26

## 2021-08-25 RX ADMIN — ALBUTEROL SULFATE 2.5 MG: 2.5 SOLUTION RESPIRATORY (INHALATION) at 08:48

## 2021-08-25 RX ADMIN — INSULIN LISPRO 1 UNITS: 100 INJECTION, SOLUTION INTRAVENOUS; SUBCUTANEOUS at 11:42

## 2021-08-25 RX ADMIN — METOPROLOL TARTRATE 25 MG: 25 TABLET, FILM COATED ORAL at 20:13

## 2021-08-25 RX ADMIN — PIPERACILLIN AND TAZOBACTAM 3375 MG: 3; .375 INJECTION, POWDER, LYOPHILIZED, FOR SOLUTION INTRAVENOUS at 20:14

## 2021-08-25 RX ADMIN — MORPHINE SULFATE 4 MG: 4 INJECTION INTRAVENOUS at 03:59

## 2021-08-25 RX ADMIN — MUPIROCIN: 20 OINTMENT TOPICAL at 20:13

## 2021-08-25 RX ADMIN — INSULIN LISPRO 1 UNITS: 100 INJECTION, SOLUTION INTRAVENOUS; SUBCUTANEOUS at 16:48

## 2021-08-25 RX ADMIN — METOPROLOL TARTRATE 25 MG: 25 TABLET, FILM COATED ORAL at 08:13

## 2021-08-25 RX ADMIN — LINEZOLID 600 MG: 600 TABLET, FILM COATED ORAL at 20:13

## 2021-08-25 RX ADMIN — LINEZOLID 600 MG: 600 TABLET, FILM COATED ORAL at 08:13

## 2021-08-25 RX ADMIN — ALBUTEROL SULFATE 2.5 MG: 2.5 SOLUTION RESPIRATORY (INHALATION) at 19:11

## 2021-08-25 RX ADMIN — INSULIN LISPRO 1 UNITS: 100 INJECTION, SOLUTION INTRAVENOUS; SUBCUTANEOUS at 20:20

## 2021-08-25 RX ADMIN — PIPERACILLIN AND TAZOBACTAM 3375 MG: 3; .375 INJECTION, POWDER, LYOPHILIZED, FOR SOLUTION INTRAVENOUS at 11:38

## 2021-08-25 RX ADMIN — OXYCODONE 10 MG: 5 TABLET ORAL at 13:39

## 2021-08-25 RX ADMIN — ACETAMINOPHEN 650 MG: 325 TABLET ORAL at 17:51

## 2021-08-25 RX ADMIN — MORPHINE SULFATE 2 MG: 2 INJECTION, SOLUTION INTRAMUSCULAR; INTRAVENOUS at 19:23

## 2021-08-25 RX ADMIN — MORPHINE SULFATE 4 MG: 4 INJECTION INTRAVENOUS at 06:43

## 2021-08-25 RX ADMIN — POLYETHYLENE GLYCOL 3350 17 G: 17 POWDER, FOR SOLUTION ORAL at 08:11

## 2021-08-25 RX ADMIN — SODIUM CHLORIDE, PRESERVATIVE FREE 10 ML: 5 INJECTION INTRAVENOUS at 20:13

## 2021-08-25 RX ADMIN — ALBUTEROL SULFATE 2.5 MG: 2.5 SOLUTION RESPIRATORY (INHALATION) at 16:29

## 2021-08-25 RX ADMIN — CEFAZOLIN 2000 MG: 10 INJECTION, POWDER, FOR SOLUTION INTRAVENOUS at 08:07

## 2021-08-25 RX ADMIN — MUPIROCIN: 20 OINTMENT TOPICAL at 08:11

## 2021-08-25 RX ADMIN — ACETAMINOPHEN 650 MG: 325 TABLET ORAL at 13:39

## 2021-08-25 RX ADMIN — ENOXAPARIN SODIUM 40 MG: 40 INJECTION SUBCUTANEOUS at 08:12

## 2021-08-25 RX ADMIN — ALBUTEROL SULFATE 2.5 MG: 2.5 SOLUTION RESPIRATORY (INHALATION) at 12:13

## 2021-08-25 RX ADMIN — MORPHINE SULFATE 2 MG: 2 INJECTION, SOLUTION INTRAMUSCULAR; INTRAVENOUS at 10:19

## 2021-08-25 ASSESSMENT — PAIN SCALES - GENERAL
PAINLEVEL_OUTOF10: 6
PAINLEVEL_OUTOF10: 8
PAINLEVEL_OUTOF10: 5
PAINLEVEL_OUTOF10: 8
PAINLEVEL_OUTOF10: 9
PAINLEVEL_OUTOF10: 0
PAINLEVEL_OUTOF10: 7
PAINLEVEL_OUTOF10: 8
PAINLEVEL_OUTOF10: 7
PAINLEVEL_OUTOF10: 7
PAINLEVEL_OUTOF10: 0
PAINLEVEL_OUTOF10: 7
PAINLEVEL_OUTOF10: 0
PAINLEVEL_OUTOF10: 7

## 2021-08-25 NOTE — PROGRESS NOTES
Hospitalist Progress Note      PCP: Kirk Noble MD    Chief Complaint. Patient is a 55-year-old male who presented to hospital for positive blood cultures. Date of Admission: 8/15/2021    Subjective: feels better, No SOB at rest, no acute events overnight, denies chest pain, nausea, vomiting, fever or chills. Medications:  Reviewed    Infusion Medications    dextrose      sodium chloride       Scheduled Medications    mupirocin   Nasal BID    insulin lispro  0-6 Units Subcutaneous TID WC    insulin lispro  0-3 Units Subcutaneous Nightly    aspirin  325 mg Oral Daily    famotidine  20 mg Oral BID    sodium chloride flush  10 mL IntraVENous 2 times per day    enoxaparin  40 mg Subcutaneous Daily    polyethylene glycol  17 g Oral Daily    albuterol  2.5 mg Nebulization Q4H WA    piperacillin-tazobactam  3,375 mg IntraVENous Q8H    linezolid  600 mg Oral 2 times per day    metoprolol tartrate  25 mg Oral BID     PRN Meds: glucose, dextrose, glucagon (rDNA), dextrose, sodium chloride flush, sodium chloride, acetaminophen, oxyCODONE **OR** oxyCODONE, morphine **OR** morphine, bisacodyl, magnesium hydroxide, ondansetron **OR** ondansetron      Intake/Output Summary (Last 24 hours) at 8/25/2021 1303  Last data filed at 8/25/2021 0900  Gross per 24 hour   Intake 1180 ml   Output 820 ml   Net 360 ml       Physical Exam Performed:    /73   Pulse 96   Temp 98 °F (36.7 °C) (Oral)   Resp 18   Ht 6' (1.829 m)   Wt 163 lb 4.8 oz (74.1 kg)   SpO2 97%   BMI 22.15 kg/m²     General appearance: NAD, sitting in chair  HEENT:  Conjunctivae/corneas clear. Neck: Supple, with full range of motion. Respiratory:  Normal respiratory effort. Clear to auscultation, bilaterally without Rales/Wheezes/Rhonchi. Cardiovascular: Regular rate and rhythm with normal S1/S2 without murmurs or rubs  Abdomen: Soft, non-tender, non-distended, normal bowel sounds.   Musculoskeletal: No cyanosis or edema bilaterally  Neurologic:  without any focal sensory/motor deficits. grossly non-focal.  Psychiatric: Alert and oriented, Normal mood  Peripheral Pulses: +2 palpable, equal bilaterally       Labs:   Recent Labs     08/24/21  0600 08/24/21  1657 08/25/21  0540   WBC 23.4* 28.8* 27.7*   HGB 9.5* 8.8* 8.1*   HCT 27.2* 26.1* 23.9*   * 746* 725*     Recent Labs     08/25/21  0540   *   K 4.6   CL 97*   CO2 26   BUN 11   CREATININE 0.9   CALCIUM 8.2*     Recent Labs     08/25/21  0540   AST 38*   ALT 46*   BILITOT 0.3   ALKPHOS 190*     No results for input(s): INR in the last 72 hours. No results for input(s): Lex Altes in the last 72 hours. Urinalysis:      Lab Results   Component Value Date    NITRU Negative 08/14/2021    WBCUA 6-9 08/14/2021    BACTERIA 3+ 08/14/2021    RBCUA 11-20 08/14/2021    BLOODU MODERATE 08/14/2021    SPECGRAV 1.025 08/14/2021    GLUCOSEU Negative 08/14/2021       Radiology:  XR CHEST PORTABLE   Final Result   Support apparatus stable. Pleural and parenchymal changes at the left lung base with a small persistent   pneumothorax noted. No great change from most recent comparison accounting   for differences in technique. XR CHEST PORTABLE   Final Result   1. Decreased left pleural effusion with small left pneumothorax. There is   improved aeration left lower lobe. Left chest tube is in place. 2. Linear right basilar opacity, likely atelectasis. XR CHEST (2 VW)   Final Result   Unchanged left basilar hydropneumothorax. CT CHEST WO CONTRAST   Final Result   Small left hydropneumothorax, with chest tube in place extending to the left   costophrenic angle. Partial consolidation of the left lower lobe is again seen, compatible with   atelectasis or pneumonia. Noncalcified pulmonary nodules are similar to prior, for which continued CT   follow-up to resolution is recommended.       RECOMMENDATIONS:   Fleischner Society guidelines for follow-up and management of incidentally   detected pulmonary nodules:      Single Solid Nodule:      Nodule size less than 6 mm   In a low-risk patient, no routine follow-up. In a high-risk patient, optional CT at 12 months. Nodule size equals 6-8 mm   In a low-risk patient, CT at 6-12 months, then consider CT at 18-24 months. In a high-risk patient, CT at 6-12 months, then CT at 18-24 months. Nodule size greater than 8 mm         In a low-risk patient, consider CT at 3 months, PET/CT, or tissue sampling. In a high-risk patient, consider CT at 3 months, PET/CT, or tissue sampling. Multiple Solid Nodules:      Nodule size less than 6 mm   In a low-risk patient, no routine follow-up. In a high-risk patient, optional CT at 12 months. Nodule size equals 6-8 mm   In a low-risk patient, CT at 3-6 months, then consider CT at 18-24 months. In a high-risk patient, CT at 3-6 months, then CT at 18-24 months. Nodule size greater than 8 mm   In a low-risk patient, CT at 3-6 months, then consider CT at 18-24 months. In a high-risk patient, CT at 3-6 months, then CT at 18-24 months. - Low risk patients include individuals with minimal or absent history of   smoking and other known risk factors. - High risk patients include individuals with a history or smoking or known   risk factors. Radiology 2017 http://pubs. rsna.org/doi/full/10.1148/radiol. 0560645814         XR CHEST (2 VW)   Final Result   Improving but incompletely resolved left basilar empyema. XR CHEST (2 VW)   Final Result   Moderate left empyema, minimally decreased from prior. XR CHEST PORTABLE   Final Result   Moderate left pleural effusion, increased as compared to prior. Bibasilar atelectasis or airspace disease. CT GUIDED PLEURAL DRAINAGE W [facility]   Final Result   Successful CT-guided chest tube placement.          IR PICC WO SQ PORT/PUMP > 5 YEARS   Final Result   Successful placement of PICC line. CT CHEST ABDOMEN PELVIS W CONTRAST   Final Result   1. Significant worsening of left lower lobe pneumonia with developing   loculated parapneumonic effusion. 2. Several rapidly enlarging right-sided subpleural pulmonary nodules. Septic emboli are a diagnostic consideration. 3. No acute abdominopelvic abnormality. XR CHEST (2 VW)   Final Result   Increased bibasilar airspace disease with small left-sided pleural effusion         CT SOFT TISSUE NECK WO CONTRAST   Final Result   No acute abnormality of the soft tissue structures of the neck. CT CHEST PULMONARY EMBOLISM W CONTRAST   Final Result   1. Multifocal airspace disease, including scattered nodular airspace disease. Correlate with presentation. Follow-up to resolution recommended. 2. Suboptimal opacification of the pulmonary arteries. No acute pulmonary   embolism to the lobar arteries given limitation. 3. Other findings as described. XR CHEST PORTABLE    (Results Pending)         Assessment/Plan:    Active Hospital Problems    Diagnosis     Loculated pleural effusion [J90]     Septic embolism (HCC) [I76]     Atelectasis of left lung [J98.11]     Abnormal LFTs [R94.5]     Former smoker [S36.184]     Marijuana smoker [F12.90]     Pharyngitis due to Streptococcus species [J02.0]     S/P patent foramen ovale closure [Z87.74]     Streptococcal bacteremia [R78.81, B95.5]     Cerebrovascular accident (CVA) [facility]) [I63.9]        Patient is a 80-year-old male who presented to hospital for positive blood cultures.     Assessment  Sepsis secondary to empyema  Strep bacteremia  Elevated LFTs  Hypertension  Hyperlipidemia  History of CVA    Plan  Continue on abx per ID following  s/p decortication surgery per cardiothoracic surgery, has chest tube - management per CTS  Pain control  monitor BMP, CBC, Hb is stable  Continue medications including aspirin, statin, Lopressor  On IV fluid therapy with normal saline, can DC tomorrow after the procedure if patient continues to tolerate diet  Diet: ADULT DIET;  Regular  Code Status: Full Code    PT/OT Eval Status: ordered    Dispo -continue IV antibiotics per ID, s/p HILDA Ro MD

## 2021-08-25 NOTE — PROGRESS NOTES
Infectious Disease Follow up Notes    CC :  Streptococcal bacteremia, empyema       Antibiotics:   Zosyn 3.375 q8 - s 8/21/21  linezolid 600 po BID - s 8/21/21    Admit Date:   8/15/2021  Hospital Day: 11    Subjective:   POD #1 s/p L VATS, decortication, evacuation of lung abscess   No fever. On RA. Has ambulated in the unit. 1 CT in place. Pain controlled. Objective:     Patient Vitals for the past 8 hrs:   BP Temp Temp src Pulse Resp SpO2 Weight   08/25/21 0900 (!) 110/95   92  98 %    08/25/21 0850      96 %    08/25/21 0800 111/77 98 °F (36.7 °C) Oral 100 16 95 %    08/25/21 0700 122/84   97  95 % 163 lb 4.8 oz (74.1 kg)       EXAM:  General:  A&O, NAD     HEENT:  NCAT, PERRL, sclera anicteric   NECK:   Supple, symmetric   LUNGS:   Non-labored breathing. CT x 1 in place, site dressed. R lung clear.   Decreased breath sounds L lung base  CV:   RRR  EXT:  No focal rash  No LE edema         LINE: PICC LUE in place, site ok          Scheduled Meds:   mupirocin   Nasal BID    insulin lispro  0-6 Units Subcutaneous TID WC    insulin lispro  0-3 Units Subcutaneous Nightly    aspirin  325 mg Oral Daily    famotidine  20 mg Oral BID    sodium chloride flush  10 mL IntraVENous 2 times per day    enoxaparin  40 mg Subcutaneous Daily    polyethylene glycol  17 g Oral Daily    albuterol  2.5 mg Nebulization Q4H WA    piperacillin-tazobactam  3,375 mg IntraVENous Q8H    linezolid  600 mg Oral 2 times per day    metoprolol tartrate  25 mg Oral BID       Continuous Infusions:   dextrose      sodium chloride            Data Review:    Lab Results   Component Value Date    WBC 27.7 (H) 08/25/2021    HGB 8.1 (L) 08/25/2021    HCT 23.9 (L) 08/25/2021    MCV 89.0 08/25/2021     (H) 08/25/2021     Lab Results   Component Value Date    CREATININE 0.9 08/25/2021    BUN 11 08/25/2021     (L) 08/25/2021    K persistent   pneumothorax noted.  No great change from most recent comparison accounting   for differences in technique. Assessment:     Patient Active Problem List    Diagnosis Date Noted    Loculated pleural effusion     Septic embolism (HCC)     Atelectasis of left lung     Abnormal LFTs     Former smoker     Marijuana smoker     Pharyngitis due to Streptococcus species 08/16/2021    S/P patent foramen ovale closure 08/16/2021    Streptococcal bacteremia 08/15/2021    Sebaceous cyst     Closed displaced fracture of base of fourth metacarpal bone of right hand 06/27/2019    Closed displaced fracture of body of hamate of right wrist 06/27/2019    PFO (patent foramen ovale)     Cerebrovascular accident (CVA) ([facility] [address] 03/11/2019    Leukocytosis 03/11/2019    Head ache 03/11/2019    Acute medial meniscal tear 07/29/2014    ACL tear 07/29/2014        S pyogenes bacteremia, +BC on 8/14/21  Acute throat/neck pain with +throat GABHS test    Septic pulmonary emboli, empyema, lung abscess   S/p CT placement 8/19/21 with serial fibrinolytic therapy  Pleural fluid studies c/w empyema. GS no organisms seen/pleural fluid culture negative to date. VATS, decortication, evacuation of abscess 8/24/21, surgical cultures pending. GS of abscess material with GPC/GPR. The GPR seen on the GS is probably contamination, await culture (GPR include Clostridia, Listeria, Bacilli, Corynebacteria, Lactobacillus, Nocardia, Actino. ..)  Recent BC are negative. GARDENIA no vegetation.    -continue linezolid, Zosyn for now.   If no further culture growth, will plan to stop linezolid   -anticipate continued IV abx after DC    Mildly elevated LFTs  Liver appeared normal on CT      History of PFO s/p repair    GARDENIA discussed with Cardiology - lesion on the closure device in L atrium most likely the post of the device   No valvular vegetations seen.           Discussed with patient/family, all questions answered  D/w DENTON Gaytan Ligia Nicole MD  Phone: [phone]   Fax : [phone]

## 2021-08-26 ENCOUNTER — APPOINTMENT (OUTPATIENT)
Dept: GENERAL RADIOLOGY | Age: 36
DRG: 853 | End: 2021-08-26
Payer: COMMERCIAL

## 2021-08-26 LAB
ANION GAP SERPL CALCULATED.3IONS-SCNC: 9 MMOL/L (ref 3–16)
BANDED NEUTROPHILS RELATIVE PERCENT: 2 % (ref 0–7)
BASOPHILS ABSOLUTE: 0 K/UL (ref 0–0.2)
BASOPHILS RELATIVE PERCENT: 0 %
BUN BLDV-MCNC: 9 MG/DL (ref 7–20)
CALCIUM SERPL-MCNC: 8.8 MG/DL (ref 8.3–10.6)
CHLORIDE BLD-SCNC: 97 MMOL/L (ref 99–110)
CO2: 27 MMOL/L (ref 21–32)
CREAT SERPL-MCNC: 0.9 MG/DL (ref 0.9–1.3)
EOSINOPHILS ABSOLUTE: 0 K/UL (ref 0–0.6)
EOSINOPHILS RELATIVE PERCENT: 0 %
GFR AFRICAN AMERICAN: >60
GFR NON-AFRICAN AMERICAN: >60
GLUCOSE BLD-MCNC: 112 MG/DL (ref 70–99)
GLUCOSE BLD-MCNC: 113 MG/DL (ref 70–99)
GLUCOSE BLD-MCNC: 129 MG/DL (ref 70–99)
HCT VFR BLD CALC: 23.3 % (ref 40.5–52.5)
HEMOGLOBIN: 7.8 G/DL (ref 13.5–17.5)
HYPOCHROMIA: ABNORMAL
LYMPHOCYTES ABSOLUTE: 3.2 K/UL (ref 1–5.1)
LYMPHOCYTES RELATIVE PERCENT: 15 %
MCH RBC QN AUTO: 29.6 PG (ref 26–34)
MCHC RBC AUTO-ENTMCNC: 33.6 G/DL (ref 31–36)
MCV RBC AUTO: 88 FL (ref 80–100)
MONOCYTES ABSOLUTE: 1.5 K/UL (ref 0–1.3)
MONOCYTES RELATIVE PERCENT: 7 %
MYELOCYTE PERCENT: 2 %
NEUTROPHILS ABSOLUTE: 16.5 K/UL (ref 1.7–7.7)
NEUTROPHILS RELATIVE PERCENT: 74 %
PDW BLD-RTO: 14.3 % (ref 12.4–15.4)
PERFORMED ON: ABNORMAL
PERFORMED ON: ABNORMAL
PLATELET # BLD: 840 K/UL (ref 135–450)
PMV BLD AUTO: 6.3 FL (ref 5–10.5)
POLYCHROMASIA: ABNORMAL
POTASSIUM REFLEX MAGNESIUM: 4.6 MMOL/L (ref 3.5–5.1)
RBC # BLD: 2.64 M/UL (ref 4.2–5.9)
SODIUM BLD-SCNC: 133 MMOL/L (ref 136–145)
WBC # BLD: 21.2 K/UL (ref 4–11)

## 2021-08-26 PROCEDURE — 6370000000 HC RX 637 (ALT 250 FOR IP): Performed by: THORACIC SURGERY (CARDIOTHORACIC VASCULAR SURGERY)

## 2021-08-26 PROCEDURE — 2580000003 HC RX 258: Performed by: THORACIC SURGERY (CARDIOTHORACIC VASCULAR SURGERY)

## 2021-08-26 PROCEDURE — 71045 X-RAY EXAM CHEST 1 VIEW: CPT

## 2021-08-26 PROCEDURE — 99232 SBSQ HOSP IP/OBS MODERATE 35: CPT | Performed by: INTERNAL MEDICINE

## 2021-08-26 PROCEDURE — 6360000002 HC RX W HCPCS: Performed by: THORACIC SURGERY (CARDIOTHORACIC VASCULAR SURGERY)

## 2021-08-26 PROCEDURE — 80048 BASIC METABOLIC PNL TOTAL CA: CPT

## 2021-08-26 PROCEDURE — 6370000000 HC RX 637 (ALT 250 FOR IP): Performed by: NURSE PRACTITIONER

## 2021-08-26 PROCEDURE — 99024 POSTOP FOLLOW-UP VISIT: CPT | Performed by: NURSE PRACTITIONER

## 2021-08-26 PROCEDURE — 94669 MECHANICAL CHEST WALL OSCILL: CPT

## 2021-08-26 PROCEDURE — 94640 AIRWAY INHALATION TREATMENT: CPT

## 2021-08-26 PROCEDURE — 85025 COMPLETE CBC W/AUTO DIFF WBC: CPT

## 2021-08-26 PROCEDURE — 6360000002 HC RX W HCPCS: Performed by: INTERNAL MEDICINE

## 2021-08-26 PROCEDURE — 2060000000 HC ICU INTERMEDIATE R&B

## 2021-08-26 RX ORDER — HYDROMORPHONE HCL 110MG/55ML
0.5 PATIENT CONTROLLED ANALGESIA SYRINGE INTRAVENOUS ONCE
Status: COMPLETED | OUTPATIENT
Start: 2021-08-26 | End: 2021-08-26

## 2021-08-26 RX ORDER — GABAPENTIN 100 MG/1
100 CAPSULE ORAL 3 TIMES DAILY
Status: DISCONTINUED | OUTPATIENT
Start: 2021-08-26 | End: 2021-08-28 | Stop reason: HOSPADM

## 2021-08-26 RX ORDER — DIPHENHYDRAMINE HCL 25 MG
25 TABLET ORAL NIGHTLY PRN
Status: DISCONTINUED | OUTPATIENT
Start: 2021-08-26 | End: 2021-08-28 | Stop reason: HOSPADM

## 2021-08-26 RX ADMIN — LINEZOLID 600 MG: 600 TABLET, FILM COATED ORAL at 08:09

## 2021-08-26 RX ADMIN — HYDROMORPHONE HYDROCHLORIDE 0.5 MG: 2 INJECTION, SOLUTION INTRAMUSCULAR; INTRAVENOUS; SUBCUTANEOUS at 14:20

## 2021-08-26 RX ADMIN — METOPROLOL TARTRATE 25 MG: 25 TABLET, FILM COATED ORAL at 20:49

## 2021-08-26 RX ADMIN — PIPERACILLIN AND TAZOBACTAM 3375 MG: 3; .375 INJECTION, POWDER, LYOPHILIZED, FOR SOLUTION INTRAVENOUS at 11:36

## 2021-08-26 RX ADMIN — FAMOTIDINE 20 MG: 20 TABLET ORAL at 08:09

## 2021-08-26 RX ADMIN — GABAPENTIN 100 MG: 100 CAPSULE ORAL at 20:49

## 2021-08-26 RX ADMIN — MUPIROCIN: 20 OINTMENT TOPICAL at 08:09

## 2021-08-26 RX ADMIN — PIPERACILLIN AND TAZOBACTAM 3375 MG: 3; .375 INJECTION, POWDER, LYOPHILIZED, FOR SOLUTION INTRAVENOUS at 19:50

## 2021-08-26 RX ADMIN — DIPHENHYDRAMINE HCL 25 MG: 25 TABLET ORAL at 20:49

## 2021-08-26 RX ADMIN — ASPIRIN 325 MG: 325 TABLET, COATED ORAL at 08:09

## 2021-08-26 RX ADMIN — OXYCODONE 10 MG: 5 TABLET ORAL at 07:05

## 2021-08-26 RX ADMIN — ALBUTEROL SULFATE 2.5 MG: 2.5 SOLUTION RESPIRATORY (INHALATION) at 08:47

## 2021-08-26 RX ADMIN — FAMOTIDINE 20 MG: 20 TABLET ORAL at 20:49

## 2021-08-26 RX ADMIN — ALBUTEROL SULFATE 2.5 MG: 2.5 SOLUTION RESPIRATORY (INHALATION) at 12:25

## 2021-08-26 RX ADMIN — PIPERACILLIN AND TAZOBACTAM 3375 MG: 3; .375 INJECTION, POWDER, LYOPHILIZED, FOR SOLUTION INTRAVENOUS at 02:25

## 2021-08-26 RX ADMIN — OXYCODONE 10 MG: 5 TABLET ORAL at 23:59

## 2021-08-26 RX ADMIN — MORPHINE SULFATE 4 MG: 4 INJECTION INTRAVENOUS at 03:23

## 2021-08-26 RX ADMIN — OXYCODONE 10 MG: 5 TABLET ORAL at 20:49

## 2021-08-26 RX ADMIN — MORPHINE SULFATE 4 MG: 4 INJECTION INTRAVENOUS at 08:09

## 2021-08-26 RX ADMIN — GABAPENTIN 100 MG: 100 CAPSULE ORAL at 14:23

## 2021-08-26 RX ADMIN — POLYETHYLENE GLYCOL 3350 17 G: 17 POWDER, FOR SOLUTION ORAL at 08:10

## 2021-08-26 RX ADMIN — OXYCODONE 10 MG: 5 TABLET ORAL at 02:22

## 2021-08-26 RX ADMIN — OXYCODONE 10 MG: 5 TABLET ORAL at 11:34

## 2021-08-26 RX ADMIN — ENOXAPARIN SODIUM 40 MG: 40 INJECTION SUBCUTANEOUS at 08:10

## 2021-08-26 RX ADMIN — MORPHINE SULFATE 4 MG: 4 INJECTION INTRAVENOUS at 05:42

## 2021-08-26 RX ADMIN — METOPROLOL TARTRATE 25 MG: 25 TABLET, FILM COATED ORAL at 08:09

## 2021-08-26 RX ADMIN — OXYCODONE 10 MG: 5 TABLET ORAL at 16:43

## 2021-08-26 ASSESSMENT — PAIN SCALES - GENERAL
PAINLEVEL_OUTOF10: 6
PAINLEVEL_OUTOF10: 7
PAINLEVEL_OUTOF10: 10
PAINLEVEL_OUTOF10: 7
PAINLEVEL_OUTOF10: 8
PAINLEVEL_OUTOF10: 7

## 2021-08-26 ASSESSMENT — PAIN SCALES - WONG BAKER
WONGBAKER_NUMERICALRESPONSE: 6;8

## 2021-08-26 NOTE — CARE COORDINATION
Case Management/Follow up:    Chart reviewed for length of stay. Hospital day #  11 Unit:  C2  Diagnosis and current status as per MD progress:POD #1 s/p L VATS, decortication, evacuation of lung abscess   No fever. On RA. Has ambulated in the unit. 1 CT in place. Group B strep bacteremia:   Persistent leukocytosis: WBC 27.7, follow trend  Continue antibiotics per ID (currently Zyvox and Zosyn)  PICC line in LUE for further Abx therapy after d/c. Anticipated d/c date: undetermined    Expected plan for discharge: Patient will be dc'd on IVABx. Had refused [facility] - states elisha is an RN, and can handle IV's etc This writer spoke with patient and elisha Coon) at bedside. Cite Richard 2 is indeed a regional float RN in [facility]. I explained that we will initially set patient up with [facility] and IV therapy team - both agree with this plan. Referral to [facility]. Spoke with SELAM, who will communicate plan to [facility].     Potential barriers: none apparent  Precert required/date initiated:  NA    Confirmed plan with patient and/or family:  Comments: see above documentation      William Rae, DENTON

## 2021-08-26 NOTE — PROGRESS NOTES
Hospitalist Progress Note      PCP: Rony Albarado MD    Chief Complaint. Patient is a 81-year-old male who presented to hospital for positive blood cultures. Date of Admission: 8/15/2021    Subjective:  He feels better but has pain at site of Chest tube, No SOB at rest, no acute events overnight, denies chest pain, nausea, vomiting, fever or chills. Medications:  Reviewed    Infusion Medications    dextrose      sodium chloride       Scheduled Medications    gabapentin  100 mg Oral TID    mupirocin   Nasal BID    insulin lispro  0-6 Units Subcutaneous TID WC    insulin lispro  0-3 Units Subcutaneous Nightly    aspirin  325 mg Oral Daily    famotidine  20 mg Oral BID    sodium chloride flush  10 mL IntraVENous 2 times per day    enoxaparin  40 mg Subcutaneous Daily    polyethylene glycol  17 g Oral Daily    albuterol  2.5 mg Nebulization Q4H WA    piperacillin-tazobactam  3,375 mg IntraVENous Q8H    metoprolol tartrate  25 mg Oral BID     PRN Meds: diphenhydrAMINE, glucose, dextrose, glucagon (rDNA), dextrose, sodium chloride flush, sodium chloride, acetaminophen, oxyCODONE **OR** oxyCODONE, morphine **OR** morphine, bisacodyl, magnesium hydroxide, ondansetron **OR** ondansetron      Intake/Output Summary (Last 24 hours) at 8/26/2021 1431  Last data filed at 8/26/2021 0615  Gross per 24 hour   Intake 2191 ml   Output 1140 ml   Net 1051 ml       Physical Exam Performed:    /81   Pulse 106   Temp 98.9 °F (37.2 °C) (Oral)   Resp 18   Ht 6' (1.829 m)   Wt 164 lb 3.9 oz (74.5 kg)   SpO2 95%   BMI 22.28 kg/m²     General appearance: NAD  HEENT:  Conjunctivae/corneas clear. Neck: Supple, with full range of motion. Respiratory:  Normal respiratory effort. Clear to auscultation, bilaterally without Rales/Wheezes/Rhonchi.   Cardiovascular: Regular rate and rhythm with normal S1/S2 without murmurs or rubs  Abdomen: Soft, non-tender, non-distended, normal bowel sounds. Musculoskeletal: No cyanosis or edema bilaterally  Neurologic:  without any focal sensory/motor deficits. grossly non-focal.  Psychiatric: Alert and oriented, Normal mood  Peripheral Pulses: +2 palpable, equal bilaterally       Labs:   Recent Labs     08/24/21  1657 08/25/21  0540 08/26/21  0710   WBC 28.8* 27.7* 21.2*   HGB 8.8* 8.1* 7.8*   HCT 26.1* 23.9* 23.3*   * 725* 840*     Recent Labs     08/25/21  0540 08/26/21  0710   * 133*   K 4.6 4.6   CL 97* 97*   CO2 26 27   BUN 11 9   CREATININE 0.9 0.9   CALCIUM 8.2* 8.8     Recent Labs     08/25/21  0540   AST 38*   ALT 46*   BILITOT 0.3   ALKPHOS 190*     No results for input(s): INR in the last 72 hours. No results for input(s): Otf Seed in the last 72 hours. Urinalysis:      Lab Results   Component Value Date    NITRU Negative 08/14/2021    WBCUA 6-9 08/14/2021    BACTERIA 3+ 08/14/2021    RBCUA 11-20 08/14/2021    BLOODU MODERATE 08/14/2021    SPECGRAV 1.025 08/14/2021    GLUCOSEU Negative 08/14/2021       Radiology:  XR CHEST PORTABLE   Final Result   No pneumothorax. Stable small left apical loculated hydropneumothorax. Improving left basilar airspace disease         XR CHEST PORTABLE   Final Result   Negative for pneumothorax. Unchanged bibasilar airspace disease/left effusion. XR CHEST PORTABLE   Final Result   Support apparatus stable. Pleural and parenchymal changes at the left lung base with a small persistent   pneumothorax noted. No great change from most recent comparison accounting   for differences in technique. XR CHEST PORTABLE   Final Result   1. Decreased left pleural effusion with small left pneumothorax. There is   improved aeration left lower lobe. Left chest tube is in place. 2. Linear right basilar opacity, likely atelectasis. XR CHEST (2 VW)   Final Result   Unchanged left basilar hydropneumothorax.          CT CHEST WO CONTRAST   Final Result   Small left hydropneumothorax, with chest tube in place extending to the left   costophrenic angle. Partial consolidation of the left lower lobe is again seen, compatible with   atelectasis or pneumonia. Noncalcified pulmonary nodules are similar to prior, for which continued CT   follow-up to resolution is recommended. RECOMMENDATIONS:   Fleischner Society guidelines for follow-up and management of incidentally   detected pulmonary nodules:      Single Solid Nodule:      Nodule size less than 6 mm   In a low-risk patient, no routine follow-up. In a high-risk patient, optional CT at 12 months. Nodule size equals 6-8 mm   In a low-risk patient, CT at 6-12 months, then consider CT at 18-24 months. In a high-risk patient, CT at 6-12 months, then CT at 18-24 months. Nodule size greater than 8 mm         In a low-risk patient, consider CT at 3 months, PET/CT, or tissue sampling. In a high-risk patient, consider CT at 3 months, PET/CT, or tissue sampling. Multiple Solid Nodules:      Nodule size less than 6 mm   In a low-risk patient, no routine follow-up. In a high-risk patient, optional CT at 12 months. Nodule size equals 6-8 mm   In a low-risk patient, CT at 3-6 months, then consider CT at 18-24 months. In a high-risk patient, CT at 3-6 months, then CT at 18-24 months. Nodule size greater than 8 mm   In a low-risk patient, CT at 3-6 months, then consider CT at 18-24 months. In a high-risk patient, CT at 3-6 months, then CT at 18-24 months. - Low risk patients include individuals with minimal or absent history of   smoking and other known risk factors. - High risk patients include individuals with a history or smoking or known   risk factors. Radiology 2017 http://pubs. rsna.org/doi/full/10.1148/radiol. 1225718196         XR CHEST (2 VW)   Final Result   Improving but incompletely resolved left basilar empyema.          XR CHEST (2 VW)   Final Result   Moderate left empyema, minimally decreased from prior. XR CHEST PORTABLE   Final Result   Moderate left pleural effusion, increased as compared to prior. Bibasilar atelectasis or airspace disease. CT GUIDED PLEURAL DRAINAGE [facility]   Final Result   Successful CT-guided chest tube placement. IR PICC WO SQ PORT/PUMP > 5 YEARS   Final Result   Successful placement of PICC line. CT CHEST ABDOMEN PELVIS W CONTRAST   Final Result   1. Significant worsening of left lower lobe pneumonia with developing   loculated parapneumonic effusion. 2. Several rapidly enlarging right-sided subpleural pulmonary nodules. Septic emboli are a diagnostic consideration. 3. No acute abdominopelvic abnormality. XR CHEST (2 VW)   Final Result   Increased bibasilar airspace disease with small left-sided pleural effusion         CT SOFT TISSUE NECK WO CONTRAST   Final Result   No acute abnormality of the soft tissue structures of the neck. CT CHEST PULMONARY EMBOLISM W CONTRAST   Final Result   1. Multifocal airspace disease, including scattered nodular airspace disease. Correlate with presentation. Follow-up to resolution recommended. 2. Suboptimal opacification of the pulmonary arteries. No acute pulmonary   embolism to the lobar arteries given limitation. 3. Other findings as described.          XR CHEST PORTABLE    (Results Pending)         Assessment/Plan:    Active Hospital Problems    Diagnosis     Loculated pleural effusion [J90]     Septic embolism (HCC) [I76]     Atelectasis of left lung [J98.11]     Abnormal LFTs [R94.5]     Former smoker [F67.071]     Marijuana smoker [F12.90]     Pharyngitis due to Streptococcus species [J02.0]     S/P patent foramen ovale closure [Z87.74]     Streptococcal bacteremia [R78.81, B95.5]     Cerebrovascular accident (CVA) [facility]) [I63.9]        Patient is a 78-year-old male who presented to hospital for positive blood cultures. Assessment  Sepsis secondary to empyema  Strep bacteremia  Elevated LFTs  Hypertension  Hyperlipidemia  History of CVA    Plan  Continue on abx per ID following  s/p decortication surgery per cardiothoracic surgery, has chest tube - management per CTS  Pain control  monitor BMP, CBC, Hb is stable  Continue medications including aspirin, statin, Lopressor  On IV fluid therapy with normal saline, can DC tomorrow after the procedure if patient continues to tolerate diet  Diet: ADULT DIET;  Regular  Code Status: Full Code    PT/OT Eval Status: ordered    Dispo -continue IV antibiotics per ID, s/p VATS, ok to transfer to floor    Darell Reid MD

## 2021-08-26 NOTE — PROGRESS NOTES
Infectious Disease Follow up Notes    CC :  Streptococcal bacteremia, empyema       Antibiotics:   Zosyn 3.375 q8 - s 8/21/21  linezolid 600 po BID - s 8/21/21    Admit Date:   8/15/2021  Hospital Day: 12    Subjective:   POD #2 s/p L VATS, decortication, evacuation of lung abscess   No fever (Tm 99.1)  On RA. CT in place - output 210cc/24 hours    Slept poorly, still having quite a bit of pain. Objective:     Patient Vitals for the past 8 hrs:   BP Temp Temp src Pulse Resp Weight   08/26/21 0600    120  164 lb 3.9 oz (74.5 kg)   08/26/21 0500    111     08/26/21 0400 134/83 99.1 °F (37.3 °C) Oral 105 18        EXAM:  General:  A&O, NAD      NECK:   Supple, symmetric   LUNGS:   Non-labored breathing. CT x 1 in place, site dressed. R lung clear.   Decreased breath sounds L lung base  CV:   RRR  EXT:  No focal rash  No LE edema         LINE: PICC LUE in place, site ok          Scheduled Meds:   mupirocin   Nasal BID    insulin lispro  0-6 Units Subcutaneous TID WC    insulin lispro  0-3 Units Subcutaneous Nightly    aspirin  325 mg Oral Daily    famotidine  20 mg Oral BID    sodium chloride flush  10 mL IntraVENous 2 times per day    enoxaparin  40 mg Subcutaneous Daily    polyethylene glycol  17 g Oral Daily    albuterol  2.5 mg Nebulization Q4H WA    piperacillin-tazobactam  3,375 mg IntraVENous Q8H    linezolid  600 mg Oral 2 times per day    metoprolol tartrate  25 mg Oral BID       Continuous Infusions:   dextrose      sodium chloride            Data Review:    Lab Results   Component Value Date    WBC 21.2 (H) 08/26/2021    HGB 7.8 (L) 08/26/2021    HCT 23.3 (L) 08/26/2021    MCV 88.0 08/26/2021     (H) 08/26/2021     Lab Results   Component Value Date    CREATININE 0.9 08/26/2021    BUN 9 08/26/2021     (L) 08/26/2021    K 4.6 08/26/2021    CL 97 (L) 08/26/2021    CO2 27 08/26/2021 Hepatic Function Panel:   Lab Results   Component Value Date    ALKPHOS 190 08/25/2021    ALT 46 08/25/2021    AST 38 08/25/2021    PROT 6.9 08/25/2021    PROT 7.0 03/07/2010    BILITOT 0.3 08/25/2021    BILIDIR 0.6 08/21/2021    IBILI 0.6 08/21/2021    LABALBU 2.5 08/25/2021         MICRO:  8/14     BC #1  S pyogenes              BC #2 neg              Throat GABHS screen +               COVID NAAT neg   8/16     COVID NAAT neg                     COVID PCR neg   8/17 BC x2 neg   8/19 Pleural fluid cultures neg, GS 3+wbc,no organisms seen  8/21 BC x2 neg    8/24 Surgical culture \"abscess\" 2+GPC/2+GPR, NGTD    Surgical tissue culture NGTD, GS no organisms seen         IMAGING:  CT chest 8/15/21  Impression   1. Multifocal airspace disease, including scattered nodular airspace disease. Correlate with presentation.  Follow-up to resolution recommended. 2. Suboptimal opacification of the pulmonary arteries.  No acute pulmonary   embolism to the lobar arteries given limitation. 3. Other findings as described.      CT neck 8/16/21 negative     CT L spine negative    CT CAP 8/18/21  Impression   1. Significant worsening of left lower lobe pneumonia with developing   loculated parapneumonic effusion. 2. Several rapidly enlarging right-sided subpleural pulmonary nodules. Septic emboli are a diagnostic consideration. 3. No acute abdominopelvic abnormality. CT chest 8/23/21  Impression   Small left hydropneumothorax, with chest tube in place extending to the left   costophrenic angle.       Partial consolidation of the left lower lobe is again seen, compatible with   atelectasis or pneumonia.       Noncalcified pulmonary nodules are similar to prior, for which continued CT   follow-up to resolution is recommended.      CXR 8/26/21      Assessment:     Patient Active Problem List    Diagnosis Date Noted    Loculated pleural effusion     Septic embolism (HCC)     Atelectasis of left lung     Abnormal LFTs     Former smoker     Marijuana smoker     Pharyngitis due to Streptococcus species 08/16/2021    S/P patent foramen ovale closure 08/16/2021    Streptococcal bacteremia 08/15/2021    Sebaceous cyst     Closed displaced fracture of base of fourth metacarpal bone of right hand 06/27/2019    Closed displaced fracture of body of hamate of right wrist 06/27/2019    PFO (patent foramen ovale)     Cerebrovascular accident (CVA) ([address] 03/11/2019    Leukocytosis 03/11/2019    Head ache 03/11/2019    Acute medial meniscal tear 07/29/2014    ACL tear 07/29/2014        S pyogenes bacteremia, +BC on 8/14/21  Acute throat/neck pain with +throat GABHS test    Septic pulmonary emboli, empyema, lung abscess   S/p CT placement 8/19/21 with serial fibrinolytic therapy  Pleural fluid studies c/w empyema. GS no organisms seen/pleural fluid culture negative to date. VATS, decortication, evacuation of abscess 8/24/21, surgical cultures negative to date. GS of abscess material with GPC/GPR. Recent BC are negative. GARDENIA no vegetation.     Leukocytosis with down trend  -continue Zosyn for now.   -stop linezolid   -anticipate continued IV abx after DC    Mildly elevated LFTs  Liver appeared normal on CT      History of PFO s/p repair    GARDENIA discussed with Cardiology - lesion on the closure device in L atrium most likely the post of the device   No valvular vegetations seen.           Discussed with patient/family, all questions answered  D/w DENTON Monroe MD  Phone: [phone]   Fax : [phone]

## 2021-08-26 NOTE — PROGRESS NOTES
CVTS Thoracic Progress Note:          CC:  Post op follow up 8/24/21 LEFT VIDEO ASSISTED THORACOSCOPIC SURGERY, DECORTICATION AND CHEST TUBE PLACEMENT     Subj: awake, sitting up on the side of his bed in NAD. Obj:    Blood pressure 121/81, pulse 106, temperature 98.9 °F (37.2 °C), temperature source Oral, resp. rate 18, height 6' (1.829 m), weight 164 lb 3.9 oz (74.5 kg), SpO2 95 %. Lungs slightly diminished to left base   Abdomen soft, non-tender   Incision w/ occlusive dressing, CDI   Chest tube with 10-30-10= 50 ml serosanguinous drainage in last 24 hours/no airleak    Diagnostics:   Recent Labs     08/24/21  1657 08/25/21  0540 08/26/21  0710   WBC 28.8* 27.7* 21.2*   HGB 8.8* 8.1* 7.8*   HCT 26.1* 23.9* 23.3*   * 725* 840*                                                                  Recent Labs     08/25/21  0540 08/26/21  0710   * 133*   K 4.6 4.6   CL 97* 97*   CO2 26 27   BUN 11 9   CREATININE 0.9 0.9   GLUCOSE 236* 112*        CXR: 8/24/21   Impression   1. Decreased left pleural effusion with small left pneumothorax.  There is   improved aeration left lower lobe.  Left chest tube is in place. 2. Linear right basilar opacity, likely atelectasis. Surgical gram stain: 2+ GPC and 2+ GPR    Assess/Plan:   Care per primary team and Infectious Disease    Left Lung abscess: s/p VATS with decortication  Placed chest tube to water seal today. Continue expansion- IS, acapella, OOBTC, ambulation  PRN pain meds. Will add gabapentin. Difficulty sleeping: will add PRN benadryl to help. Group B strep bacteremia:   Persistent leukocytosis: WBC 21.2, follow trend  Continue antibiotics per ID (currently Zosyn)  PICC line in [facility] for further Abx therapy after d/c. Dispo:  Ok to transfer to .   ______________________________________________________    JAMES Luo CNP  8/26/2021  2:18 PM   Note reviewed, events of last 24 hours reviewed along with vital signs and I/Os and

## 2021-08-26 NOTE — CARE COORDINATION
[facility]    Referral received from  to follow for home care services. I will follow for needs, and speak with patient to verify demos. I have submitted for approval, and will update when I'm able.     [employer] notified satya Tim RN, BSN CTN  [facility] [phone]

## 2021-08-26 NOTE — FLOWSHEET NOTE
08/26/21 1954   Assessment   Charting Type Shift assessment   Neurological   Neuro (WDL) WDL   Level of Consciousness Alert (0)   Orientation Level Oriented X4   Cognition Appropriate judgement; Appropriate safety awareness; Appropriate attention/concentration; Appropriate for developmental age; Follows commands   Language Clear; Appropriate for developmental age   [de-identified] Coma Scale   Eye Opening 4   Best Verbal Response 5   Best Motor Response 6   Mylene Coma Scale Score 15   HEENT   HEENT (WDL) WDL   Respiratory   Respiratory (WDL) X  (CHEST TUBE)   Respiratory Pattern Regular   Respiratory Depth Normal   Respiratory Quality/Effort Unlabored  (PAIN WITH BREATHING AND COUGHING)   Chest Assessment Chest expansion symmetrical;Trachea midline   L Breath Sounds Diminished   R Breath Sounds Diminished   Breath Sounds   Right Upper Lobe Clear   Right Middle Lobe Clear   Right Lower Lobe Diminished   Left Upper Lobe Diminished   Left Lower Lobe Clear   Cough/Sputum   Sputum How Obtained Cough on request   Cough Weak;Non-productive   Sputum Amount None   Sputum Color None   Tenacity None   Cardiac   Cardiac (WDL) WDL   Cardiac Regularity Regular   Heart Sounds S1, S2   Cardiac Rhythm Sinus tachy   Cardiac Monitor   Telemetry Monitor On Yes   Telemetry Audible Yes   Telemetry Alarms Set Yes   Gastrointestinal   Abdominal (WDL) WDL   Tenderness Soft   RUQ Bowel Sounds Active   LUQ Bowel Sounds Active   RLQ Bowel Sounds Active   LLQ Bowel Sounds Active   Peripheral Vascular   Peripheral Vascular (WDL) WDL   Edema None   Skin Color/Condition   Skin Color/Condition (WDL) X   Skin Integrity   Skin Integrity (WDL) X  (see LDA)   Musculoskeletal   Musculoskeletal (WDL) X   RUE Full movement   LUE Limited movement   RL Extremity Full movement   LL Extremity Full movement   Genitourinary   Genitourinary (WDL) WDL   Flank Tenderness No   Suprapubic Tenderness No   Dysuria No   Urine Assessment   Urine Color Yellow/straw   Urine

## 2021-08-26 NOTE — CARE COORDINATION
Referral received to check IV Benefits.  Patients benefit information is as follows:       Patient is covered at 100%    Electronically signed by Hunter Perez on 8/26/2021 at 12:42 PM   Cell Ph# [phone], Office # [phone]

## 2021-08-27 ENCOUNTER — APPOINTMENT (OUTPATIENT)
Dept: GENERAL RADIOLOGY | Age: 36
DRG: 853 | End: 2021-08-27
Payer: COMMERCIAL

## 2021-08-27 LAB
ALBUMIN SERPL-MCNC: 3.2 G/DL (ref 3.4–5)
ALP BLD-CCNC: 222 U/L (ref 40–129)
ALT SERPL-CCNC: 101 U/L (ref 10–40)
ANISOCYTOSIS: ABNORMAL
AST SERPL-CCNC: 93 U/L (ref 15–37)
BANDED NEUTROPHILS RELATIVE PERCENT: 19 % (ref 0–7)
BASOPHILS ABSOLUTE: 0 K/UL (ref 0–0.2)
BASOPHILS RELATIVE PERCENT: 0 %
BILIRUB SERPL-MCNC: 0.3 MG/DL (ref 0–1)
BILIRUBIN DIRECT: <0.2 MG/DL (ref 0–0.3)
BILIRUBIN, INDIRECT: ABNORMAL MG/DL (ref 0–1)
EOSINOPHILS ABSOLUTE: 0 K/UL (ref 0–0.6)
EOSINOPHILS RELATIVE PERCENT: 0 %
GLUCOSE BLD-MCNC: 114 MG/DL (ref 70–99)
GLUCOSE BLD-MCNC: 117 MG/DL (ref 70–99)
GLUCOSE BLD-MCNC: 156 MG/DL (ref 70–99)
GLUCOSE BLD-MCNC: 162 MG/DL (ref 70–99)
HAV IGM SER IA-ACNC: NORMAL
HCT VFR BLD CALC: 23.3 % (ref 40.5–52.5)
HEMOGLOBIN: 8 G/DL (ref 13.5–17.5)
HEPATITIS B CORE IGM ANTIBODY: NORMAL
HEPATITIS B SURFACE ANTIGEN INTERPRETATION: NORMAL
HEPATITIS C ANTIBODY INTERPRETATION: NORMAL
LYMPHOCYTES ABSOLUTE: 4.5 K/UL (ref 1–5.1)
LYMPHOCYTES RELATIVE PERCENT: 28 %
MACROCYTES: ABNORMAL
MCH RBC QN AUTO: 30.5 PG (ref 26–34)
MCHC RBC AUTO-ENTMCNC: 34.2 G/DL (ref 31–36)
MCV RBC AUTO: 89.2 FL (ref 80–100)
METAMYELOCYTES RELATIVE PERCENT: 2 %
MICROCYTES: ABNORMAL
MONOCYTES ABSOLUTE: 0.8 K/UL (ref 0–1.3)
MONOCYTES RELATIVE PERCENT: 5 %
NEUTROPHILS ABSOLUTE: 10.7 K/UL (ref 1.7–7.7)
NEUTROPHILS RELATIVE PERCENT: 46 %
PDW BLD-RTO: 14.3 % (ref 12.4–15.4)
PERFORMED ON: ABNORMAL
PLATELET # BLD: 956 K/UL (ref 135–450)
PLATELET SLIDE REVIEW: ABNORMAL
PMV BLD AUTO: 5.9 FL (ref 5–10.5)
POIKILOCYTES: ABNORMAL
POLYCHROMASIA: ABNORMAL
RBC # BLD: 2.61 M/UL (ref 4.2–5.9)
SLIDE REVIEW: ABNORMAL
TOTAL PROTEIN: 8.4 G/DL (ref 6.4–8.2)
WBC # BLD: 15.9 K/UL (ref 4–11)

## 2021-08-27 PROCEDURE — 2580000003 HC RX 258: Performed by: THORACIC SURGERY (CARDIOTHORACIC VASCULAR SURGERY)

## 2021-08-27 PROCEDURE — 36415 COLL VENOUS BLD VENIPUNCTURE: CPT

## 2021-08-27 PROCEDURE — 6370000000 HC RX 637 (ALT 250 FOR IP): Performed by: THORACIC SURGERY (CARDIOTHORACIC VASCULAR SURGERY)

## 2021-08-27 PROCEDURE — 6360000002 HC RX W HCPCS: Performed by: THORACIC SURGERY (CARDIOTHORACIC VASCULAR SURGERY)

## 2021-08-27 PROCEDURE — 6370000000 HC RX 637 (ALT 250 FOR IP): Performed by: NURSE PRACTITIONER

## 2021-08-27 PROCEDURE — 2580000003 HC RX 258: Performed by: INTERNAL MEDICINE

## 2021-08-27 PROCEDURE — 6360000002 HC RX W HCPCS: Performed by: INTERNAL MEDICINE

## 2021-08-27 PROCEDURE — 71045 X-RAY EXAM CHEST 1 VIEW: CPT

## 2021-08-27 PROCEDURE — 80074 ACUTE HEPATITIS PANEL: CPT

## 2021-08-27 PROCEDURE — 99232 SBSQ HOSP IP/OBS MODERATE 35: CPT | Performed by: INTERNAL MEDICINE

## 2021-08-27 PROCEDURE — 99024 POSTOP FOLLOW-UP VISIT: CPT | Performed by: NURSE PRACTITIONER

## 2021-08-27 PROCEDURE — 2060000000 HC ICU INTERMEDIATE R&B

## 2021-08-27 PROCEDURE — 80076 HEPATIC FUNCTION PANEL: CPT

## 2021-08-27 PROCEDURE — 85025 COMPLETE CBC W/AUTO DIFF WBC: CPT

## 2021-08-27 RX ORDER — GABAPENTIN 100 MG/1
100 CAPSULE ORAL 3 TIMES DAILY
Qty: 9 CAPSULE | Refills: 0 | Status: SHIPPED | OUTPATIENT
Start: 2021-08-27 | End: 2021-09-09 | Stop reason: ALTCHOICE

## 2021-08-27 RX ORDER — OXYCODONE HYDROCHLORIDE 5 MG/1
5 TABLET ORAL EVERY 8 HOURS PRN
Qty: 9 TABLET | Refills: 0 | Status: SHIPPED | OUTPATIENT
Start: 2021-08-27 | End: 2021-08-30

## 2021-08-27 RX ORDER — FAMOTIDINE 20 MG/1
20 TABLET, FILM COATED ORAL 2 TIMES DAILY
Qty: 60 TABLET | Refills: 3 | Status: SHIPPED | OUTPATIENT
Start: 2021-08-27 | End: 2021-09-09 | Stop reason: SDUPTHER

## 2021-08-27 RX ADMIN — OXYCODONE 5 MG: 5 TABLET ORAL at 18:14

## 2021-08-27 RX ADMIN — GABAPENTIN 100 MG: 100 CAPSULE ORAL at 15:04

## 2021-08-27 RX ADMIN — PIPERACILLIN AND TAZOBACTAM 3375 MG: 3; .375 INJECTION, POWDER, LYOPHILIZED, FOR SOLUTION INTRAVENOUS at 11:55

## 2021-08-27 RX ADMIN — PIPERACILLIN AND TAZOBACTAM 3375 MG: 3; .375 INJECTION, POWDER, LYOPHILIZED, FOR SOLUTION INTRAVENOUS at 05:18

## 2021-08-27 RX ADMIN — GABAPENTIN 100 MG: 100 CAPSULE ORAL at 08:48

## 2021-08-27 RX ADMIN — ASPIRIN 325 MG: 325 TABLET, COATED ORAL at 08:47

## 2021-08-27 RX ADMIN — GABAPENTIN 100 MG: 100 CAPSULE ORAL at 20:06

## 2021-08-27 RX ADMIN — OXYCODONE 10 MG: 5 TABLET ORAL at 05:11

## 2021-08-27 RX ADMIN — OXYCODONE 10 MG: 5 TABLET ORAL at 09:35

## 2021-08-27 RX ADMIN — METOPROLOL TARTRATE 25 MG: 25 TABLET, FILM COATED ORAL at 08:48

## 2021-08-27 RX ADMIN — METOPROLOL TARTRATE 25 MG: 25 TABLET, FILM COATED ORAL at 20:06

## 2021-08-27 RX ADMIN — INSULIN LISPRO 1 UNITS: 100 INJECTION, SOLUTION INTRAVENOUS; SUBCUTANEOUS at 20:50

## 2021-08-27 RX ADMIN — ERTAPENEM SODIUM 1000 MG: 1 INJECTION, POWDER, LYOPHILIZED, FOR SOLUTION INTRAMUSCULAR; INTRAVENOUS at 15:06

## 2021-08-27 RX ADMIN — FAMOTIDINE 20 MG: 20 TABLET ORAL at 08:48

## 2021-08-27 RX ADMIN — MORPHINE SULFATE 4 MG: 4 INJECTION INTRAVENOUS at 11:59

## 2021-08-27 RX ADMIN — FAMOTIDINE 20 MG: 20 TABLET ORAL at 20:06

## 2021-08-27 RX ADMIN — SODIUM CHLORIDE, PRESERVATIVE FREE 10 ML: 5 INJECTION INTRAVENOUS at 11:59

## 2021-08-27 ASSESSMENT — PAIN DESCRIPTION - ONSET
ONSET: ON-GOING
ONSET: ON-GOING

## 2021-08-27 ASSESSMENT — PAIN SCALES - WONG BAKER
WONGBAKER_NUMERICALRESPONSE: 4

## 2021-08-27 ASSESSMENT — PAIN DESCRIPTION - ORIENTATION
ORIENTATION: LEFT
ORIENTATION: LEFT

## 2021-08-27 ASSESSMENT — PAIN DESCRIPTION - PAIN TYPE
TYPE: ACUTE PAIN;SURGICAL PAIN
TYPE: ACUTE PAIN;SURGICAL PAIN

## 2021-08-27 ASSESSMENT — PAIN DESCRIPTION - LOCATION
LOCATION: INCISION
LOCATION: INCISION

## 2021-08-27 ASSESSMENT — PAIN SCALES - GENERAL
PAINLEVEL_OUTOF10: 7
PAINLEVEL_OUTOF10: 6
PAINLEVEL_OUTOF10: 8
PAINLEVEL_OUTOF10: 7
PAINLEVEL_OUTOF10: 0
PAINLEVEL_OUTOF10: 7
PAINLEVEL_OUTOF10: 7
PAINLEVEL_OUTOF10: 0
PAINLEVEL_OUTOF10: 5
PAINLEVEL_OUTOF10: 5
PAINLEVEL_OUTOF10: 4
PAINLEVEL_OUTOF10: 2

## 2021-08-27 ASSESSMENT — PAIN DESCRIPTION - DESCRIPTORS
DESCRIPTORS: ACHING;CONSTANT
DESCRIPTORS: ACHING

## 2021-08-27 ASSESSMENT — PAIN DESCRIPTION - FREQUENCY
FREQUENCY: CONTINUOUS
FREQUENCY: INTERMITTENT

## 2021-08-27 ASSESSMENT — PAIN - FUNCTIONAL ASSESSMENT
PAIN_FUNCTIONAL_ASSESSMENT: PREVENTS OR INTERFERES SOME ACTIVE ACTIVITIES AND ADLS
PAIN_FUNCTIONAL_ASSESSMENT: PREVENTS OR INTERFERES SOME ACTIVE ACTIVITIES AND ADLS

## 2021-08-27 NOTE — CARE COORDINATION
[facility] unable to staff weekend iv  Reached out to [address] for Sunday IV SOC  Awaiting response    [facility] notified potential dc planned 8/28  Elijah Medina  notified of update; patient has no preference in agency  Wife is teachable for IV    Hong Huber RN, BSN CTN  [facility] [phone]

## 2021-08-27 NOTE — PROGRESS NOTES
CVTS Thoracic Progress Note:          CC:  Post op follow up 8/24/21 LEFT VIDEO ASSISTED THORACOSCOPIC SURGERY, DECORTICATION AND CHEST TUBE PLACEMENT     Subj: awake, sitting up in his bed in NAD. Obj:    Blood pressure 130/83, pulse 111, temperature 98 °F (36.7 °C), temperature source Oral, resp. rate 16, height 6' (1.829 m), weight 153 lb 14.1 oz (69.8 kg), SpO2 96 %. Lungs slightly diminished to left base   Abdomen soft, non-tender   Incision w/ occlusive dressing, CDI   Chest tube with 0-10-0= 10 ml serosanguinous drainage in last 24 hours/no airleak    Diagnostics:   Recent Labs     08/24/21  1657 08/25/21  0540 08/26/21  0710   WBC 28.8* 27.7* 21.2*   HGB 8.8* 8.1* 7.8*   HCT 26.1* 23.9* 23.3*   * 725* 840*                                                                  Recent Labs     08/25/21  0540 08/26/21  0710   * 133*   K 4.6 4.6   CL 97* 97*   CO2 26 27   BUN 11 9   CREATININE 0.9 0.9   GLUCOSE 236* 112*        CXR: 8/24/21   Impression   1. Decreased left pleural effusion with small left pneumothorax.  There is   improved aeration left lower lobe.  Left chest tube is in place. 2. Linear right basilar opacity, likely atelectasis. Surgical gram stain: 2+ GPC and 2+ GPR    Assess/Plan:   Care per primary team and Infectious Disease    Left Lung abscess: s/p VATS with decortication  Remove chest tube this morning. CXR 2 hours afterward. Continue expansion- IS, acapella, OOBTC, ambulation    Difficulty sleeping: reports that he slept better last night. Continue PRN benadryl. Group B strep bacteremia:   Persistent leukocytosis: WBC 21.2 on 8/26 - no morning labs today   Continue antibiotics per ID (currently Zosyn)  PICC line in LUE for further Abx therapy after d/c. Dispo: If CXR is stable (after chest tube removal) we will sign off. He will need to follow up with Dr. Effie Woods the week of 9/6. Dr. Effie Woods will discuss getting repeat GARDENIA in 6 weeks with Cardiology. ______________________________________________________    JAMES Burk CNP  8/27/2021  9:32 AM

## 2021-08-27 NOTE — CARE COORDINATION
Spoke with patient and fiance at bedside regarding discharge plan. Referral for home care was initially given to [facility] but they are unable to staff so referral was passed on to [facility] and they will be able to follow for home care needs at discharge. Patient and fiance are aware of the above and in agreement. They are also aware that Freda Padron with [employer] is following and assisting in teaching with administration of IV antibiotics. Discharge order was put in but ID has not signed off so patient not discharging today. Peace Latham with [facility] aware of the situation and assisting CM with communicating the above to [facility] and Freda Padron with [employer].

## 2021-08-27 NOTE — DISCHARGE SUMMARY
Hospital Medicine Discharge Summary    Patient ID: Reema Watson      Patient's PCP: Rony Albarado MD    Admit Date: 8/15/2021     Discharge Date: 8/27/2021    Admitting Physician: Mona Jamison MD     Discharge Physician: Sherri Rogers MD     Discharge Diagnoses: Active Hospital Problems    Diagnosis Date Noted    Loculated pleural effusion [J90]     Septic embolism (HCC) [I76]     Atelectasis of left lung [J98.11]     Abnormal LFTs [R94.5]     Former smoker [V94.960]     Marijuana smoker [F12.90]     Pharyngitis due to Streptococcus species [J02.0] 08/16/2021    S/P patent foramen ovale closure [Z87.74] 08/16/2021    Streptococcal bacteremia [R78.81, B95.5] 08/15/2021    Cerebrovascular accident (CVA) ([address] [I63.9] 03/11/2019       The patient was seen and examined on day of discharge and this discharge summary is in conjunction with any daily progress note from day of discharge. Condition at discharge - stable    Hospital Course: patient seen and evaluated on the day of discharge. Patient informed about following up with appointments. Patient verbalized understanding for follow-up appointments. The patient and / or the family were informed of the results of tests, a time was given to answer questions, a plan was proposed and they agreed with plan. Medical reconciliation performed. Patient discharged stable condition.       Patient is a 29-year-old male who presented to hospital for positive blood cultures.     Assessment  Sepsis secondary to empyema  Strep bacteremia  Elevated LFTs  Hypertension  Hyperlipidemia  History of CVA     Plan  Continue on abx per ID following  s/p decortication surgery per cardiothoracic surgery, has chest tube - management per CTS  Pain control  monitor BMP, CBC, Hb is stable  Continue medications including aspirin, statin, Lopressor    DC after chest tube is removed and OK with cardiothoracic surgery      Exam:     /88   Pulse 91 Temp 98.1 °F (36.7 °C) (Oral)   Resp 16   Ht 6' (1.829 m)   Wt 153 lb 14.1 oz (69.8 kg)   SpO2 96%   BMI 20.87 kg/m²     General appearance: No apparent distress  HEENT:  Conjunctivae/corneas clear. Neck: Supple, No jugular venous distention. Respiratory:  Normal respiratory effort. Clear to auscultation, bilaterally without Rales/Wheezes/Rhonchi. Cardiovascular: Regular rate and rhythm with normal S1/S2 without murmurs, rubs or gallops. Abdomen: Soft, non-tender, non-distended, normal bowel sounds. Musculoskelatal: No clubbing, cyanosis or edema bilaterally. Skin: Skin color, texture, turgor normal.   Neurologic: no focal neurologic deficits. grossly non-focal.  Psychiatric: Alert and oriented, normal mood    Consults:     IP CONSULT TO HOSPITALIST  IP CONSULT TO PHARMACY  IP CONSULT TO INFECTIOUS DISEASES  IP CONSULT TO PULMONOLOGY  IP CONSULT TO CARDIOLOGY  IP CONSULT TO CARDIOTHORACIC SURGERY  IP CONSULT TO GI      Code Status:  Full Code    Activity: activity as tolerated    Labs: For convenience and continuity at follow-up the following most recent labs are provided:      CBC:    Lab Results   Component Value Date    WBC 21.2 08/26/2021    HGB 7.8 08/26/2021    HCT 23.3 08/26/2021     08/26/2021       Renal:    Lab Results   Component Value Date     08/26/2021    K 4.6 08/26/2021    CL 97 08/26/2021    CO2 27 08/26/2021    BUN 9 08/26/2021    CREATININE 0.9 08/26/2021    CALCIUM 8.8 08/26/2021    PHOS 5.0 08/21/2021       Discharge Medications:     Current Discharge Medication List           Details   oxyCODONE (ROXICODONE) 5 MG immediate release tablet Take 1 tablet by mouth every 8 hours as needed for Pain for up to 3 days. Qty: 9 tablet, Refills: 0    Comments: Reduce doses taken as pain becomes manageable  Associated Diagnoses: Chest wall pain following surgery      gabapentin (NEURONTIN) 100 MG capsule Take 1 capsule by mouth 3 times daily for 3 days.   Qty: 9 capsule, Refills: 0 metoprolol tartrate (LOPRESSOR) 25 MG tablet Take 1 tablet by mouth 2 times daily  Qty: 60 tablet, Refills: 3      famotidine (PEPCID) 20 MG tablet Take 1 tablet by mouth 2 times daily  Qty: 60 tablet, Refills: 3              Details   aspirin EC 81 MG EC tablet Take 1 tablet by mouth daily  Qty: 90 tablet, Refills: 1      atorvastatin (LIPITOR) 40 MG tablet Take 1 tablet by mouth nightly  Qty: 90 tablet, Refills: 3             Time Spent on discharge is more than 30 mints in the examination, evaluation, counseling and review of medications and discharge plan. Signed:    Alex Rubio MD   8/27/2021      Thank you Christine Aparicio MD for the opportunity to be involved in this patient's care. If you have any questions or concerns please feel free to contact me at [phone].

## 2021-08-27 NOTE — PROGRESS NOTES
Infectious Disease Follow up Notes    CC :  Streptococcal bacteremia, empyema       Antibiotics:   Zosyn 3.375 q8 - s 8/21/21  linezolid 600 po BID - s 8/21/21    Admit Date:   8/15/2021  Hospital Day: 13    Subjective:   POD #3 s/p L VATS, decortication, evacuation of lung abscess   Afebrile, on RA  Feels well, eager for DC   No GI complaints     Objective:     Patient Vitals for the past 8 hrs:   BP Temp Temp src Pulse Resp SpO2   08/27/21 1150 127/88 98.1 °F (36.7 °C) Oral 91 16 96 %   08/27/21 0800    110     08/27/21 0758 130/83 98 °F (36.7 °C) Oral 111 16 96 %   08/27/21 0525 131/80 98.5 °F (36.9 °C) Oral 112 16 96 %       EXAM:  General:  A&O, NAD      NECK:   Supple, symmetric   LUNGS:   Non-labored breathing. CT x 1 in place, site dressed. R lung clear.   Decreased breath sounds L lung base  CV:   RRR  ABD:   Soft, NT.  +BS   EXT:  No focal rash  No LE edema         LINE: PICC LUE in place, site ok          Scheduled Meds:   NONFORMULARY 1,000 mg  1,000 mg IntraVENous Daily    gabapentin  100 mg Oral TID    mupirocin   Nasal BID    insulin lispro  0-6 Units Subcutaneous TID WC    insulin lispro  0-3 Units Subcutaneous Nightly    aspirin  325 mg Oral Daily    famotidine  20 mg Oral BID    sodium chloride flush  10 mL IntraVENous 2 times per day    enoxaparin  40 mg Subcutaneous Daily    polyethylene glycol  17 g Oral Daily    metoprolol tartrate  25 mg Oral BID       Continuous Infusions:   dextrose      sodium chloride            Data Review:    Lab Results   Component Value Date    WBC 21.2 (H) 08/26/2021    HGB 7.8 (L) 08/26/2021    HCT 23.3 (L) 08/26/2021    MCV 88.0 08/26/2021     (H) 08/26/2021     Lab Results   Component Value Date    CREATININE 0.9 08/26/2021    BUN 9 08/26/2021     (L) 08/26/2021    K 4.6 08/26/2021    CL 97 (L) 08/26/2021    CO2 27 08/26/2021       Hepatic Function Panel:  Marijuana smoker     Pharyngitis due to Streptococcus species 08/16/2021    S/P patent foramen ovale closure 08/16/2021    Streptococcal bacteremia 08/15/2021    Sebaceous cyst     Closed displaced fracture of base of fourth metacarpal bone of right hand 06/27/2019    Closed displaced fracture of body of hamate of right wrist 06/27/2019    PFO (patent foramen ovale)     Cerebrovascular accident (CVA) ([address] 03/11/2019    Leukocytosis 03/11/2019    Head ache 03/11/2019    Acute medial meniscal tear 07/29/2014    ACL tear 07/29/2014        S pyogenes bacteremia, +BC on 8/14/21  Acute throat/neck pain with +throat GABHS test    Septic pulmonary emboli, empyema, lung abscess   S/p CT placement 8/19/21 with serial fibrinolytic therapy  Pleural fluid studies c/w empyema. GS no organisms seen/pleural fluid culture negative to date. VATS, decortication, evacuation of abscess 8/24/21, surgical cultures negative to date. GS of abscess material with GPC/GPR. Recent BC are negative. G[place]EN[place] no vegetation. Leukocytosis with down trend  -continue Zosyn for now.   -stop linezolid   -anticipate continued IV abx after DC    Elevated LFTs  Liver appeared normal on CT 8/18/21  Abd exam benign  Enzymes up further today  ? Iatrogenic?   Has had doses of lipitor here, off for several days  -change abx to ertapenem as planned for home going  -US liver, acute viral hep panel   -Ask GI to see      History of PFO s/p repair    G[place]EN[place] discussed with Cardiology - lesion on the closure device in L atrium most likely the post of the device   No valvular vegetations seen.         MALGORZATA completed  Repeat CBC pending       Discussed with patient/family, all questions answered  D/w RN         Recommended Follow-up, Labs or Other Treatments After Discharge:      ID INFUSION ORDERS  Ertapanem 1g IV q24 through 9/24/21  Weekly qMonday CBC diff, CMP faxed to [phone]   Routine PICC care  Call with ID related concerns  Follow-up with me in 2-3 weeks, call for appt [phone]   MD Brannon Perry MD  Phone: [phone]   Fax : [phone]

## 2021-08-27 NOTE — CONSULTS
Gastroenterology Consult Note    Patient:   Miriam Pierce   YOB: 1985   Facility:   [facility]   Referring/PCP: Gary Hale MD  Date:     8/27/2021  Consultant:   Benito Tovar MD, MD    Subjective: This 30 y.o. male was admitted 8/15/2021 with a diagnosis of \"Streptococcal bacteremia [R78.81, B95.5]\" and is seen in consultation regarding \"elev LFT's\". Information was obtained from interview of  the patient, examination of the patient, and review of records. I did  update the past medical, surgical, social and / or family history. Elevated LFT's mild in liver for days assoc w sepsis/Abx's    Current status  Present  Diet Order: ADULT DIET; Regular and he is tolerating diet. Recently, he has experienced no abdominal  Pain and he has not required Intravenous narcotic analgesics. The patient has also experienced no constipation, diarrhea, fever, hematochezia, melena, nausea and vomiting      Prior to Admission medications    Medication Sig Start Date End Date Taking?  Authorizing Provider   aspirin EC 81 MG EC tablet Take 1 tablet by mouth daily 11/3/20  Yes Gary Hale MD   atorvastatin (LIPITOR) 40 MG tablet Take 1 tablet by mouth nightly 11/6/19  Yes Gary Hale MD      Scheduled Medications:    ertapenem Elvi Madina) IVPB  1,000 mg IntraVENous Q24H    gabapentin  100 mg Oral TID    mupirocin   Nasal BID    insulin lispro  0-6 Units Subcutaneous TID WC    insulin lispro  0-3 Units Subcutaneous Nightly    aspirin  325 mg Oral Daily    famotidine  20 mg Oral BID    sodium chloride flush  10 mL IntraVENous 2 times per day    enoxaparin  40 mg Subcutaneous Daily    polyethylene glycol  17 g Oral Daily    metoprolol tartrate  25 mg Oral BID     Infusions:    dextrose      sodium chloride       PRN Medications: diphenhydrAMINE, glucose, dextrose, glucagon (rDNA), dextrose, sodium chloride flush, sodium chloride, acetaminophen, oxyCODONE **OR** oxyCODONE, morphine **OR** morphine, bisacodyl, magnesium hydroxide, ondansetron **OR** ondansetron  Allergies: No Known Allergies    Past Medical History:   Diagnosis Date    CVA (cerebral vascular accident) ([facility] [place] [address]     no residuals EXCEPT PERIPHERAL RT SIDE VISION    Hernia     History of bone graft     PFO (patent foramen ovale) 2019    PFO closure with 25 mm PFO occluder device    TMJ (dislocation of temporomandibular joint)      Past Surgical History:   Procedure Laterality Date    ANTERIOR CRUCIATE LIGAMENT REPAIR Left 09/10/2014    BONE GRAFT      left wrist    CARDIAC SURGERY  2019    PFO  plACED AFTER STROKE    CT INSERT CATH PLEURA W IMAGE  2021    CT INSERT CATH PLEURA W IMAGE 2021 Mi Conklin [facility] CT SCAN    HAND SURGERY      right thumb pins    PRE-MALIGNANT / BENIGN SKIN LESION EXCISION N/A 2020    SEBACEOUS CYST EXCISION, POSTERIOR SCALP performed by Deanna Hernandez MD at [facility] Left 2021    LEFT VIDEO ASSISTED THORACOSCOPIC SURGERY, DECORTICATION AND DRAINAGE OF LUNG ABCESS WITH CHEST TUBE PLACEMENT performed by Patricia Varghese MD at [place] [address] TRANSESOPHAGEAL ECHOCARDIOGRAM  2021    [facility] with Dr Dino Morrissey       Social:   Social History     Tobacco Use    Smoking status: Former Smoker     Packs/day: 0.33     Types: Cigarettes     Quit date: 3/11/2019     Years since quittin.4    Smokeless tobacco: Former User     Types: Snuff    Tobacco comment: quit 3/2019   Substance Use Topics    Alcohol use:  Yes     Alcohol/week: 6.0 standard drinks     Types: 6 Cans of beer per week     Comment: 6 drinks per week     Family:   Family History   Problem Relation Age of Onset    Cancer Maternal Grandmother     Cancer Maternal Grandfather     Heart Failure Maternal Grandfather     Cancer Paternal Grandmother     High Blood Pressure Paternal Grandmother         MI    Cancer Paternal Grandfather     Other Paternal Grandfather        ROS: Pertinent items are noted in HPI.     Objective:   Vital Signs:  Temp (24hrs), Av.4 °F (36.9 °C), Min:98 °F (36.7 °C), Max:99 °F (74.2 °C)     Systolic (97NKQ), JUO:421 , Min:123 , IPP:958      Diastolic (99AEP), TSX:32, Min:76, Max:88     Pulse  Av  Min: 91  Max: 132  /88   Pulse 91   Temp 98.1 °F (36.7 °C) (Oral)   Resp 16   Ht 6' (1.829 m)   Wt 153 lb 14.1 oz (69.8 kg)   SpO2 96%   BMI 20.87 kg/m²      Physical Exam:   /88   Pulse 91   Temp 98.1 °F (36.7 °C) (Oral)   Resp 16   Ht 6' (1.829 m)   Wt 153 lb 14.1 oz (69.8 kg)   SpO2 96%   BMI 20.87 kg/m²   General appearance: alert, appears stated age and cooperative  Lungs: clear to auscultation bilaterally  Chest wall: no tenderness  Heart: regular rate and rhythm, S1, S2 normal, no murmur, click, rub or gallop  Abdomen: soft, non-tender; bowel sounds normal; no masses,  no organomegaly  Extremities: extremities normal, atraumatic, no cyanosis or edema  Skin: Skin color, texture, turgor normal. No rashes or lesions  Neurologic: Grossly normal    Lab and Imaging Review   Recent Labs     21  1657 21  0540 21  0710 21  0540   WBC 28.8* 27.7* 21.2*  --    HGB 8.8* 8.1* 7.8*  --    MCV 88.1 89.0 88.0  --    * 725* 840*  --    NA  --  132* 133*  --    K  --  4.6 4.6  --    CL  --  97* 97*  --    CO2  --   27  --    BUN  --  11 9  --    CREATININE  --  0.9 0.9  --    GLUCOSE  --  236* 112*  --    CALCIUM  --  8.2* 8.8  --    PROT  --  6.9  --  8.4*   LABALBU  --  2.5*  --  3.2*   AST  --  38*  --  93*   ALT  --  46*  --  101*   ALKPHOS  --  190*  --  222*   BILITOT  --  0.3  --  0.3   BILIDIR  --   --   --  <0.2       Assessment:     Patient Active Problem List    Diagnosis Date Noted    Loculated pleural effusion     Septic embolism (HCC)     Atelectasis of left lung     Abnormal LFTs     Former smoker     Marijuana smoker     Pharyngitis due to Streptococcus species 08/16/2021    S/P patent foramen ovale closure 08/16/2021    Streptococcal bacteremia 08/15/2021    Sebaceous cyst     Closed displaced fracture of base of fourth metacarpal bone of right hand 06/27/2019    Closed displaced fracture of body of hamate of right wrist 06/27/2019    PFO (patent foramen ovale)     Cerebrovascular accident [facility] [address] 03/11/2019    Leukocytosis 03/11/2019    Head ache 03/11/2019    Acute medial meniscal tear 07/29/2014    ACL tear 07/29/2014     31 yo w ETOH use (6 beers q week) admitted w sepsis due to empyema and septic emboli, s/p decortication. Liver enzymes were nl on 8/14 and have since increased, which could be multifactorial including iatrogenic due to Abx's or Lipitor or related to sepsis. He denies GI Sx.    Plan:   1. Agree w [facility] and hepatitis panel  2. ID is switching Abx's and has been off Lipitor for a few days  3.  Will follow liver enzymes for now    Silva Lovett MD       (O) [phone]

## 2021-08-27 NOTE — DISCHARGE INSTR - COC
Continuity of Care Form    Patient Name: Leighton Gutierrez   :  1985  MRN:  [id number]    Admit date:  8/15/2021  Discharge date:  ***    Code Status Order: Full Code   Advance Directives:      Admitting Physician:  Eli Retana MD  PCP: Ariel Sepulveda MD    Discharging Nurse: [facility] Unit/Room#: 4475/3950-00  Discharging Unit Phone Number: ***    Emergency Contact:   Extended Emergency Contact Information  Primary Emergency Contact: Fernando [address]org[address] Phone: [phone]  Mobile Phone: [phone]  Relation: Other  Secondary Emergency Contact: Alexander Khoury  Address: [address] [place] [address] Phone: [phone]  Mobile Phone: [phone]  Relation: Parent    Past Surgical History:  Past Surgical History:   Procedure Laterality Date    ANTERIOR CRUCIATE LIGAMENT REPAIR Left 09/10/2014    BONE GRAFT      left wrist    CARDIAC SURGERY  2019    PFO  plACED AFTER STROKE    CT INSERT CATH PLEURA W IMAGE  2021    CT INSERT CATH PLEURA W IMAGE 2021 Mi Conklin MD [facility] CT SCAN    HAND SURGERY      right thumb pins    PRE-MALIGNANT / BENIGN SKIN LESION EXCISION N/A 2020    SEBACEOUS CYST EXCISION, POSTERIOR SCALP performed by Deanna Hernandez MD at [address] [place] [address] Left 2021    LEFT VIDEO ASSISTED THORACOSCOPIC SURGERY, DECORTICATION AND DRAINAGE OF LUNG ABCESS WITH CHEST TUBE PLACEMENT performed by Patricia Varghese MD at [place] [address] TRANSESOPHAGEAL ECHOCARDIOGRAM  2021    [place] with Dr Dino Morrissey       Immunization History:   Immunization History   Administered Date(s) Administered    Tdap (Boostrix, Adacel) 2019       Active Problems:  Patient Active Problem List   Diagnosis Code    Acute medial meniscal tear S83.249A    ACL tear S83.519A    Cerebrovascular accident (CVA) ([facility] [address] I63.9    Leukocytosis D72.829    Head ache R51.9    PFO (patent foramen ovale) Q21.1    Closed displaced fracture of base of fourth metacarpal bone of right hand S62.314A    Closed displaced fracture of body of hamate of right wrist S62.141A    Sebaceous cyst L72.3    Streptococcal bacteremia R78.81, B95.5    Pharyngitis due to Streptococcus species J02.0    S/P patent foramen ovale closure Z87.74    Loculated pleural effusion J90    Septic embolism (HCC) I76    Atelectasis of left lung J98.11    Abnormal LFTs R94.5    Former smoker Z87.891    Marijuana smoker F12.90       Isolation/Infection:   Isolation            No Isolation          Patient Infection Status       Infection Onset Added Last Indicated Last Indicated By Review Planned Expiration Resolved Resolved By    None active    Resolved    COVID-19 Rule Out 08/16/21 08/16/21 08/16/21 COVID-19 (Ordered)   08/16/21 Rule-Out Test Resulted    COVID-19 Rule Out 08/16/21 08/16/21 08/16/21 COVID-19, Rapid (Ordered)   08/16/21 Rule-Out Test Resulted    COVID-19 Rule Out 08/14/21 08/14/21 08/14/21 COVID-19, Rapid (Ordered)   08/14/21 Rule-Out Test Resulted            Nurse Assessment:  Last Vital Signs: /88   Pulse 91   Temp 98.1 °F (36.7 °C) (Oral)   Resp 16   Ht 6' (1.829 m)   Wt 153 lb 14.1 oz (69.8 kg)   SpO2 96%   BMI 20.87 kg/m²     Last documented pain score (0-10 scale): Pain Level: 8  Last Weight:   Wt Readings from Last 1 Encounters:   08/27/21 153 lb 14.1 oz (69.8 kg)     Mental Status:  {IP PT MENTAL STATUS:20030:::0}    IV Access:  { [facility] IV ACCESS:043339712:::0}    Nursing Mobility/ADLs:  Walking   {[facility] DME ADLs:525882828:::0}  Transfer  {[facility] DME ADLs:970765542:::0}  Bathing  {[facility] DME ADLs:241564571:::0}  Dressing  {[facility] DME ADLs:482881969:::0}  Toileting  {[facility] DME ADLs:236871756:::0}  Feeding  {[facility] DME ADLs:792407866:::0}  Med Admin  {[facility] DME ADLs:769879378:::0}  Med Delivery   {[facility] MED Delivery:308737080:::0}    Wound Care Documentation and Therapy:        Elimination:  Continence:   · Bowel: {YES / CF:83888}  · Bladder: {YES / P}  Urinary Catheter: {Urinary Catheter:336820837:::0}   Colostomy/Ileostomy/Ileal Conduit: {YES / KH:51600}       Date of Last BM: ***    Intake/Output Summary (Last 24 hours) at 2021 1320  Last data filed at 2021 1230  Gross per 24 hour   Intake 800 ml   Output 1285 ml   Net -485 ml     I/O last 3 completed shifts: In: 480 [P.O.:480]  Out: 1060 [Urine:1050;  Chest Tube:10]    Safety Concerns:     508 28msec Safety Concerns:174903683:::0}    Impairments/Disabilities:      50 28msec Impairments/Disabilities:022517274:::0}    Nutrition Therapy:  Current Nutrition Therapy:   [facility] 28msec Diet List:164934328:::0}    Routes of Feeding: {[facility] DME Other Feedings:915052541:::0}  Liquids: {Slp liquid thickness:55468}  Daily Fluid Restriction: {[facility] DME Yes amt example:174224201:::0}  Last Modified Barium Swallow with Video (Video Swallowing Test): {Done Not Done CKND:594173116:::1}    Treatments at the Time of Hospital Discharge:   Respiratory Treatments: ***  Oxygen Therapy:  {Therapy; copd oxygen:42209:::0}  Ventilator:    {[facility] Vent List:675214760:::0}    Rehab Therapies: {THERAPEUTIC INTERVENTION:4512115277}  Weight Bearing Status/Restrictions: [facility] [employer]  Weight Bearin:::0}  Other Medical Equipment (for information only, NOT a DME order):  {EQUIPMENT:023120631}  Other Treatments: ***    Patient's personal belongings (please select all that are sent with patient):  {[facility] DME Belongings:484757593:::0}    RN SIGNATURE:  {Esignature:619232329:::0}    CASE MANAGEMENT/SOCIAL WORK SECTION    Inpatient Status Date: 8/15/21    Readmission Risk Assessment Score:  Readmission Risk              Risk of Unplanned Readmission:  18           Discharging to Facility/ Agency   · Name: [facility] for Nurse/[facility] for antibiotic delivery  · Address:  · Phone: [phone]  · Fax:    Dialysis Facility (if applicable)   · Name:  · Address:  · Dialysis

## 2021-08-27 NOTE — PROGRESS NOTES
Consult called to [address] [facility] on 8/27/2021 @ [address] [address]

## 2021-08-28 ENCOUNTER — APPOINTMENT (OUTPATIENT)
Dept: ULTRASOUND IMAGING | Age: 36
DRG: 853 | End: 2021-08-28
Payer: COMMERCIAL

## 2021-08-28 ENCOUNTER — APPOINTMENT (OUTPATIENT)
Dept: GENERAL RADIOLOGY | Age: 36
DRG: 853 | End: 2021-08-28
Payer: COMMERCIAL

## 2021-08-28 VITALS
WEIGHT: 157.2 LBS | DIASTOLIC BLOOD PRESSURE: 80 MMHG | HEIGHT: 72 IN | TEMPERATURE: 98.2 F | SYSTOLIC BLOOD PRESSURE: 114 MMHG | RESPIRATION RATE: 16 BRPM | HEART RATE: 98 BPM | BODY MASS INDEX: 21.29 KG/M2 | OXYGEN SATURATION: 95 %

## 2021-08-28 LAB
A/G RATIO: 0.6 (ref 1.1–2.2)
ALBUMIN SERPL-MCNC: 3.2 G/DL (ref 3.4–5)
ALP BLD-CCNC: 207 U/L (ref 40–129)
ALT SERPL-CCNC: 100 U/L (ref 10–40)
ANAEROBIC CULTURE: ABNORMAL
ANION GAP SERPL CALCULATED.3IONS-SCNC: 9 MMOL/L (ref 3–16)
AST SERPL-CCNC: 70 U/L (ref 15–37)
BILIRUB SERPL-MCNC: 0.4 MG/DL (ref 0–1)
BUN BLDV-MCNC: 13 MG/DL (ref 7–20)
CALCIUM SERPL-MCNC: 9.3 MG/DL (ref 8.3–10.6)
CHLORIDE BLD-SCNC: 96 MMOL/L (ref 99–110)
CO2: 26 MMOL/L (ref 21–32)
CREAT SERPL-MCNC: 0.8 MG/DL (ref 0.9–1.3)
CULTURE SURGICAL: ABNORMAL
GFR AFRICAN AMERICAN: >60
GFR NON-AFRICAN AMERICAN: >60
GLOBULIN: 5.3 G/DL
GLUCOSE BLD-MCNC: 107 MG/DL (ref 70–99)
GLUCOSE BLD-MCNC: 110 MG/DL (ref 70–99)
GLUCOSE BLD-MCNC: 139 MG/DL (ref 70–99)
GRAM STAIN RESULT: ABNORMAL
ORGANISM: ABNORMAL
ORGANISM: ABNORMAL
PERFORMED ON: ABNORMAL
PERFORMED ON: ABNORMAL
POTASSIUM SERPL-SCNC: 4.4 MMOL/L (ref 3.5–5.1)
SODIUM BLD-SCNC: 131 MMOL/L (ref 136–145)
TOTAL PROTEIN: 8.5 G/DL (ref 6.4–8.2)

## 2021-08-28 PROCEDURE — 6360000002 HC RX W HCPCS: Performed by: THORACIC SURGERY (CARDIOTHORACIC VASCULAR SURGERY)

## 2021-08-28 PROCEDURE — 6370000000 HC RX 637 (ALT 250 FOR IP): Performed by: THORACIC SURGERY (CARDIOTHORACIC VASCULAR SURGERY)

## 2021-08-28 PROCEDURE — 80053 COMPREHEN METABOLIC PANEL: CPT

## 2021-08-28 PROCEDURE — 6370000000 HC RX 637 (ALT 250 FOR IP): Performed by: NURSE PRACTITIONER

## 2021-08-28 PROCEDURE — 2580000003 HC RX 258: Performed by: INTERNAL MEDICINE

## 2021-08-28 PROCEDURE — 99024 POSTOP FOLLOW-UP VISIT: CPT | Performed by: THORACIC SURGERY (CARDIOTHORACIC VASCULAR SURGERY)

## 2021-08-28 PROCEDURE — 71045 X-RAY EXAM CHEST 1 VIEW: CPT

## 2021-08-28 PROCEDURE — 2580000003 HC RX 258: Performed by: THORACIC SURGERY (CARDIOTHORACIC VASCULAR SURGERY)

## 2021-08-28 PROCEDURE — 6360000002 HC RX W HCPCS: Performed by: INTERNAL MEDICINE

## 2021-08-28 PROCEDURE — 76705 ECHO EXAM OF ABDOMEN: CPT

## 2021-08-28 RX ADMIN — ASPIRIN 325 MG: 325 TABLET, COATED ORAL at 09:18

## 2021-08-28 RX ADMIN — GABAPENTIN 100 MG: 100 CAPSULE ORAL at 14:30

## 2021-08-28 RX ADMIN — ERTAPENEM SODIUM 1000 MG: 1 INJECTION, POWDER, LYOPHILIZED, FOR SOLUTION INTRAMUSCULAR; INTRAVENOUS at 14:30

## 2021-08-28 RX ADMIN — OXYCODONE 5 MG: 5 TABLET ORAL at 00:01

## 2021-08-28 RX ADMIN — ENOXAPARIN SODIUM 40 MG: 40 INJECTION SUBCUTANEOUS at 09:18

## 2021-08-28 RX ADMIN — GABAPENTIN 100 MG: 100 CAPSULE ORAL at 09:18

## 2021-08-28 RX ADMIN — METOPROLOL TARTRATE 25 MG: 25 TABLET, FILM COATED ORAL at 09:18

## 2021-08-28 RX ADMIN — OXYCODONE 5 MG: 5 TABLET ORAL at 04:36

## 2021-08-28 RX ADMIN — SODIUM CHLORIDE, PRESERVATIVE FREE 10 ML: 5 INJECTION INTRAVENOUS at 09:19

## 2021-08-28 RX ADMIN — FAMOTIDINE 20 MG: 20 TABLET ORAL at 09:18

## 2021-08-28 ASSESSMENT — PAIN SCALES - GENERAL
PAINLEVEL_OUTOF10: 5
PAINLEVEL_OUTOF10: 5

## 2021-08-28 NOTE — FLOWSHEET NOTE
08/27/21 2005   Assessment   Charting Type Shift assessment   Neurological   Neuro (WDL) WDL   Level of Consciousness Alert (0)   Orientation Level Oriented X4   Cognition Appropriate judgement; Appropriate safety awareness; Appropriate attention/concentration; Appropriate for developmental age; Follows commands   Language Clear; Appropriate for developmental age   [de-identified] Coma Scale   Eye Opening 4   Best Verbal Response 5   Best Motor Response 6   [place]ylene Coma Scale Score 15   HEENT   HEENT (WDL) WDL   Respiratory   Respiratory (WDL) WDL   Respiratory Pattern Regular   Respiratory Depth Normal   Respiratory Quality/Effort Unlabored   Chest Assessment Chest expansion symmetrical;Trachea midline   L Breath Sounds Diminished   R Breath Sounds Diminished   Breath Sounds   Right Upper Lobe Clear   Right Middle Lobe Clear   Right Lower Lobe Diminished   Left Upper Lobe Clear   Left Lower Lobe Diminished   Cardiac   Cardiac (WDL) WDL   Cardiac Regularity Regular   Heart Sounds S1, S2   Cardiac Rhythm Sinus tachy   Rhythm Interpretation   Pulse 104   Cardiac Monitor   Telemetry Monitor On Yes   Telemetry Audible Yes   Telemetry Alarms Set Yes   Gastrointestinal   Abdominal (WDL) WDL   Abdomen Inspection Soft;Flat   Tenderness Soft   RUQ Bowel Sounds Active   LUQ Bowel Sounds Active   RLQ Bowel Sounds Active   LLQ Bowel Sounds Active   Peripheral Vascular   Peripheral Vascular (WDL) WDL   Edema None   Skin Color/Condition   Skin Color/Condition (WDL) WDL   Skin Integrity   Skin Integrity (WDL) X   Musculoskeletal   Musculoskeletal (WDL) X   RUE Full movement   LUE Full movement   RL Extremity Full movement   LL Extremity Full movement   Genitourinary   Genitourinary (WDL) WDL   Flank Tenderness No   Suprapubic Tenderness No   Dysuria No   Urine Assessment   Urine Color Yellow/straw   Urine Appearance Clear   Incontinence No   Incision 08/24/21 Chest Lateral;Left   Date First Assessed: 08/24/21   Primary Wound Type: Surgical Type  Location: Chest  Wound Location Orientation: Lateral;Left  Wound Description (Comments): THORACOSCOPIC INCISION X3   Dressing Status Clean; Intact;Dry   Dressing/Treatment Dry dressing   Psychosocial   Psychosocial (WDL) WDL   Patient Behaviors Calm; Cooperative

## 2021-08-28 NOTE — CARE COORDINATION
CASE MANAGEMENT DISCHARGE SUMMARY      Discharge to: home with [facility]    IMM given: (date)     [address] ordered/agency: [facility] for IV antibiotics     Transportation:    Family/car: private    Confirmed discharge plan with:RN,[facility] and [facility]     Patient: yes     Family:  yes/no    Name: Contact number:     Facility/Agency, name:  [facility] [phone]   Phone number for report to facility:      RN, name: Diana Baker     Note: Discharging nurse to complete [facility], reconcile [facility], and place final copy with patient's discharge packet. RN to ensure that written prescriptions for  Level II medications are sent with patient to the facility as per protocol.

## 2021-08-28 NOTE — PROGRESS NOTES
GI Progress Note      SUBJECTIVE:  Pt denies nausea, emesis, abd pain    OBJECTIVE      Medications    Current Facility-Administered Medications: ertapenem (INVanz) 1000 mg IVPB minibag, 1,000 mg, IntraVENous, Q24H  gabapentin (NEURONTIN) capsule 100 mg, 100 mg, Oral, TID  diphenhydrAMINE (BENADRYL) tablet 25 mg, 25 mg, Oral, Nightly PRN  mupirocin (BACTROBAN) 2 % ointment, , Nasal, BID  insulin lispro (HUMALOG) injection vial 0-6 Units, 0-6 Units, Subcutaneous, TID WC  insulin lispro (HUMALOG) injection vial 0-3 Units, 0-3 Units, Subcutaneous, Nightly  glucose (GLUTOSE) 40 % oral gel 15 g, 15 g, Oral, PRN  dextrose 50 % IV solution, 12.5 g, IntraVENous, PRN  glucagon (rDNA) injection 1 mg, 1 mg, IntraMUSCular, PRN  dextrose 5 % solution, 100 mL/hr, IntraVENous, PRN  aspirin EC tablet 325 mg, 325 mg, Oral, Daily  famotidine (PEPCID) tablet 20 mg, 20 mg, Oral, BID  sodium chloride flush 0.9 % injection 10 mL, 10 mL, IntraVENous, 2 times per day  sodium chloride flush 0.9 % injection 10 mL, 10 mL, IntraVENous, PRN  0.9 % sodium chloride infusion, 25 mL, IntraVENous, PRN  enoxaparin (LOVENOX) injection 40 mg, 40 mg, Subcutaneous, Daily  acetaminophen (TYLENOL) tablet 650 mg, 650 mg, Oral, Q4H PRN  oxyCODONE (ROXICODONE) immediate release tablet 5 mg, 5 mg, Oral, Q4H PRN **OR** oxyCODONE (ROXICODONE) immediate release tablet 10 mg, 10 mg, Oral, Q4H PRN  morphine (PF) injection 2 mg, 2 mg, IntraVENous, Q2H PRN **OR** morphine (PF) injection 4 mg, 4 mg, IntraVENous, Q2H PRN  polyethylene glycol (GLYCOLAX) packet 17 g, 17 g, Oral, Daily  bisacodyl (DULCOLAX) EC tablet 5 mg, 5 mg, Oral, Daily PRN  magnesium hydroxide (MILK OF MAGNESIA) 400 MG/5ML suspension 30 mL, 30 mL, Oral, Daily PRN  ondansetron (ZOFRAN-ODT) disintegrating tablet 4 mg, 4 mg, Oral, Q8H PRN **OR** ondansetron (ZOFRAN) injection 4 mg, 4 mg, IntraVENous, Q6H PRN  metoprolol tartrate (LOPRESSOR) tablet 25 mg, 25 mg, Oral, BID  Physical    VITALS:  /85 Pulse 100   Temp 98.2 °F (36.8 °C) (Oral)   Resp 16   Ht 6' (1.829 m)   Wt 157 lb 3.2 oz (71.3 kg)   SpO2 94%   BMI 21.32 kg/m²   ABD: soft, NT, ND, NABS  Data    CBC with Differential:    Lab Results   Component Value Date    WBC 15.9 08/27/2021    RBC 2.61 08/27/2021    HGB 8.0 08/27/2021    HCT 23.3 08/27/2021     08/27/2021    MCV 89.2 08/27/2021    MCH 30.5 08/27/2021    MCHC 34.2 08/27/2021    RDW 14.3 08/27/2021    SEGSPCT 79.9 03/07/2010    BANDSPCT 19 08/27/2021    METASPCT 2 08/27/2021    LYMPHOPCT 28.0 08/27/2021    MONOPCT 5.0 08/27/2021    MYELOPCT 2 08/26/2021    EOSPCT 0.5 03/07/2010    BASOPCT 0.0 08/27/2021    MONOSABS 0.8 08/27/2021    LYMPHSABS 4.5 08/27/2021    EOSABS 0.0 08/27/2021    BASOSABS 0.0 08/27/2021    DIFFTYPE Auto 03/07/2010     CMP:    Lab Results   Component Value Date     08/28/2021    K 4.4 08/28/2021    K 4.6 08/26/2021    CL 96 08/28/2021    CO2 26 08/28/2021    BUN 13 08/28/2021    CREATININE 0.8 08/28/2021    GFRAA >60 08/28/2021    GFRAA >60 03/07/2010    AGRATIO 0.6 08/28/2021    LABGLOM >60 08/28/2021    GLUCOSE 107 08/28/2021    PROT 8.5 08/28/2021    PROT 7.0 03/07/2010    LABALBU 3.2 08/28/2021    CALCIUM 9.3 08/28/2021    BILITOT 0.4 08/28/2021    ALKPHOS 207 08/28/2021    AST 70 08/28/2021     08/28/2021       US:   Impression   The gallbladder is not distended, with no shadowing stones.  A small amount   of dependent sludge is noted, with some areas of borderline wall thickening   of the gallbladder.  A small gallbladder polyp is also noted measuring 2.8   mm.  Negative sonographic Reyes's sign and no pericholecystic fluid is seen   however to suggest       Otherwise unremarkable exam.  Acute cholecystitis. ASSESSMENT AND PLAN  33yo M with h/o CVA and PFO admitted on 8/15 with a left lung abscess and group B strep bacteremia that we have been asked to see regarding abnl LFTs.   A review of the available labs in [facility] indicate an initial rise in LFTs on 8/15. There has been fluctuation without predominant pattern. Eval to date includes an acute hepatitis panel that is unremarkable and an US that as well does not demonstrate an etiology. Given the presence of a diminutive GB polyp and borderline wall thickening the US should be repeated in 6 months. There is a low index of suspicion that acute cholecystitis is present. I suspect the LFT changes reflect either a drug induced process or may be associated with the infection.   He denies a FHx of liver disease.  - obtain labs to complete serologic evaluation  - avoid hepatotoxic medications  - follow daily LFTs  - further evaluation may take place as an outpatient

## 2021-08-28 NOTE — DISCHARGE SUMMARY
Hospital Medicine Discharge Summary    Patient ID: Maria Del Carmen Martinez      Patient's PCP: Taryn Carson MD    Admit Date: 8/15/2021     Discharge Date: 8/28/2021    Admitting Physician: Jaun Sepulveda MD     Discharge Physician: Viktoriya Gamboa MD     Discharge Diagnoses: Active Hospital Problems    Diagnosis Date Noted    Loculated pleural effusion [J90]     Septic embolism (HCC) [I76]     Atelectasis of left lung [J98.11]     Abnormal LFTs [R94.5]     Former smoker [N32.706]     Marijuana smoker [F12.90]     Pharyngitis due to Streptococcus species [J02.0] 08/16/2021    S/P patent foramen ovale closure [Z87.74] 08/16/2021    Streptococcal bacteremia [R78.81, B95.5] 08/15/2021    Cerebrovascular accident (CVA) ([facility] [address] [I63.9] 03/11/2019       The patient was seen and examined on day of discharge and this discharge summary is in conjunction with any daily progress note from day of discharge. Condition at discharge - stable    Hospital Course: patient seen and evaluated on the day of discharge. Patient informed about following up with appointments. Patient verbalized understanding for follow-up appointments. The patient and / or the family were informed of the results of tests, a time was given to answer questions, a plan was proposed and they agreed with plan. Medical reconciliation performed. Patient discharged stable condition.       Patient is a 61-year-old male who presented to hospital for positive blood cultures.     Assessment  Sepsis secondary to empyema  Strep bacteremia  Elevated LFTs  Hypertension  Hyperlipidemia  History of CVA     Plan  Continue on abx per ID following  s/p decortication surgery per cardiothoracic surgery, has chest tube - management per CTS  Pain control  monitor BMP, CBC, Hb is stable  Continue medications including aspirin, statin, Lopressor    chest tube is removed and OK to dc with cardiothoracic surgery, he is being discharged today on ABX per ID, follow up with GI as OP, statins on Hold      Exam:     /80   Pulse 98   Temp 98.2 °F (36.8 °C) (Oral)   Resp 16   Ht 6' (1.829 m)   Wt 157 lb 3.2 oz (71.3 kg)   SpO2 95%   BMI 21.32 kg/m²     General appearance: No apparent distress  HEENT:  Conjunctivae/corneas clear. Neck: Supple, No jugular venous distention. Respiratory:  Normal respiratory effort. Clear to auscultation, bilaterally without Rales/Wheezes/Rhonchi. Cardiovascular: Regular rate and rhythm with normal S1/S2 without murmurs, rubs or gallops. Abdomen: Soft, non-tender, non-distended, normal bowel sounds. Musculoskelatal: No clubbing, cyanosis or edema bilaterally. Skin: Skin color, texture, turgor normal.   Neurologic: no focal neurologic deficits. grossly non-focal.  Psychiatric: Alert and oriented, normal mood    Consults:     IP CONSULT TO HOSPITALIST  IP CONSULT TO PHARMACY  IP CONSULT TO INFECTIOUS DISEASES  IP CONSULT TO PULMONOLOGY  IP CONSULT TO CARDIOLOGY  IP CONSULT TO CARDIOTHORACIC SURGERY  IP CONSULT TO GI  IP CONSULT TO HOME CARE NEEDS      Code Status:  Full Code    Activity: activity as tolerated    Labs: For convenience and continuity at follow-up the following most recent labs are provided:      CBC:    Lab Results   Component Value Date    WBC 15.9 08/27/2021    HGB 8.0 08/27/2021    HCT 23.3 08/27/2021     08/27/2021       Renal:    Lab Results   Component Value Date     08/28/2021    K 4.4 08/28/2021    K 4.6 08/26/2021    CL 96 08/28/2021    CO2 26 08/28/2021    BUN 13 08/28/2021    CREATININE 0.8 08/28/2021    CALCIUM 9.3 08/28/2021    PHOS 5.0 08/21/2021       Discharge Medications:     Current Discharge Medication List           Details   oxyCODONE (ROXICODONE) 5 MG immediate release tablet Take 1 tablet by mouth every 8 hours as needed for Pain for up to 3 days.   Qty: 9 tablet, Refills: 0    Comments: Reduce doses taken as pain becomes manageable  Associated Diagnoses: Chest wall pain following surgery      gabapentin (NEURONTIN) 100 MG capsule Take 1 capsule by mouth 3 times daily for 3 days. Qty: 9 capsule, Refills: 0      metoprolol tartrate (LOPRESSOR) 25 MG tablet Take 1 tablet by mouth 2 times daily  Qty: 60 tablet, Refills: 3      famotidine (PEPCID) 20 MG tablet Take 1 tablet by mouth 2 times daily  Qty: 60 tablet, Refills: 3              Details   aspirin EC 81 MG EC tablet Take 1 tablet by mouth daily  Qty: 90 tablet, Refills: 1             Time Spent on discharge is more than 30 mints in the examination, evaluation, counseling and review of medications and discharge plan. Signed:    Cintia Moffett MD   8/28/2021      Thank you Urszula Ponce MD for the opportunity to be involved in this patient's care. If you have any questions or concerns please feel free to contact me at [phone].

## 2021-08-28 NOTE — PROGRESS NOTES
CVTS Thoracic Progress Note:          CC:  Post op follow up 8/24/21 LEFT VIDEO ASSISTED THORACOSCOPIC SURGERY, DECORTICATION AND CHEST TUBE PLACEMENT     Subj: awake, sitting up in bedside chair. Planning on going home per medical team.     Obj:    Blood pressure 124/85, pulse 100, temperature 98.2 °F (36.8 °C), temperature source Oral, resp. rate 16, height 6' (1.829 m), weight 157 lb 3.2 oz (71.3 kg), SpO2 94 %. Lungs slightly diminished to left base   Abdomen soft, non-tender   Incision w/ occlusive dressing, CDI   Chest tube out    Diagnostics:   Recent Labs     08/26/21  0710 08/27/21  1620   WBC 21.2* 15.9*   HGB 7.8* 8.0*   HCT 23.3* 23.3*   * 956*                                                                  Recent Labs     08/26/21  0710 08/28/21  0530   * 131*   K 4.6 4.4   CL 97* 96*   CO2 27 26   BUN 9 13   CREATININE 0.9 0.8*   GLUCOSE 112* 107*        CXR: 8/24/21   Impression   1. Decreased left pleural effusion with small left pneumothorax.  There is   improved aeration left lower lobe.  Left chest tube is in place. 2. Linear right basilar opacity, likely atelectasis. Surgical gram stain: 2+ GPC and 2+ GPR    Assess/Plan:   Care per primary team and Infectious Disease    Left Lung abscess: s/p VATS with decortication  CXR satisfactory post chest tube removal.  Continue expansion- IS, acapella, OOBTC, ambulation    Group B strep bacteremia:   Persistent leukocytosis: WBC 21.2 on 8/26 - no morning labs today   Continue antibiotics per ID (currently Zosyn)  PICC line in LUE for further Abx therapy after d/c. Dispo:  CXR is stable (after chest tube removal) we will sign off. He will need to follow up with Dr. Sky Estrella the week of 9/6. Dr. Sky Estrella will discuss getting repeat GARDENIA in 6 weeks with Cardiology.     ______________________________________________________    Caterina Perez MD  8/28/2021  10:01 AM

## 2021-08-29 LAB
ANAEROBIC CULTURE: ABNORMAL
GRAM STAIN RESULT: ABNORMAL
ORGANISM: ABNORMAL
WOUND/ABSCESS: ABNORMAL

## 2021-08-29 NOTE — OP NOTE
significantly all the rind was removed. [place] of the left upper lobe was dissected and released. Copious amount of free irrigation with 5 L of normal saline was performed second inspection showed lung abscess at the left lower lobe the abscess was opened and drained culture was sent completely removed.   Copious amount of irrigation was done no active bleeding was seen 1 chest tube was placed through one of the port the rest of the port was closed lung was insufflated and direct vision with no difficulty patient was dispositioned to recovery room in stable condition without no complication counts were told was correct x2    Electronically signed by Mary Kay Prado MD on 8/29/2021 at 4:46 PM

## 2021-08-30 ENCOUNTER — CARE COORDINATION (OUTPATIENT)
Dept: CASE MANAGEMENT | Age: 36
End: 2021-08-30

## 2021-08-30 NOTE — CARE COORDINATION
[facility] Transitions Initial Follow Up Call    Call within 2 business days of discharge: Yes    Patient: Nata Osman Patient : 1985   MRN: [id number]  Reason for Admission: strep   Discharge Date: 21 RARS: Readmission Risk Score: 19      Last Discharge [address] [address]       Complaint Diagnosis Description Type Department Provider    8/15/21 Other; Back Pain Streptococcal bacteremia . .. ED to Hosp-Admission (Discharged) (ADMITTED) Pretty Murillo MD; Sly Underwood. .. Spoke with: [address]: [facility]    Transitions of Care Initial Call    Challenges to be reviewed by the provider   Additional needs identified to be addressed with provider: No  none             Method of communication with provider : phone      Advance Care Planning:   Does patient have an Advance Directive: reviewed and current, reviewed and needs to be updated, not on file; education provided, not on file, patient declined education, decision maker updated and referral to internal ACP facilitator. Was this a readmission? No  Patient stated reason for admission: strep    Care Transition Nurse (CTN) contacted the patient by telephone to perform post hospital discharge assessment. Verified name and  with patient as identifiers. Provided introduction to self, and explanation of the CTN role. CTN reviewed discharge instructions, medical action plan and red flags with patient who verbalized understanding. Patient given an opportunity to ask questions and does not have any further questions or concerns at this time. Were discharge instructions available to patient? Yes. Reviewed appropriate site of care based on symptoms and resources available to patient including: PCP and Specialist. The patient agrees to contact the PCP office for questions related to their healthcare.      Medication reconciliation was performed with patient, who verbalizes understanding of administration of home medications. Advised obtaining a 90-day supply of all daily and as-needed medications. Covid Risk Education     Educated patient about risk for severe COVID-19 due to risk factors according to CDC guidelines. CTN reviewed discharge instructions, medical action plan and red flag symptoms with the patient who verbalized understanding. Discussed COVID vaccination status: No. Education provided on COVID-19 vaccination as appropriate. Discussed exposure protocols and quarantine with CDC Guidelines. Patient was given an opportunity to verbalize any questions and concerns and agrees to contact CTN or health care provider for questions related to their healthcare. Reviewed and educated patient on any new and changed medications related to discharge diagnosis. Was patient discharged with a pulse oximeter? No Discussed and confirmed pulse oximeter discharge instructions and when to notify provider or seek emergency care. Patient answered call and verified . Patient agreeable to transition call. Patient feeling better and happy to be back home. Patient confirmed home care completed [facility] yesterday to show him and his fiance how to administer the antibiotics. Patient stated that his fiance is a nurse and familiar with infusions, PICC care, and orders. Post d/c summary available and noted labs to be drawn on , but patient had not heard from [facility] yet today. CTN placed call to [facility] and spoke to [facility]. [facility] was getting ready to care call patient and nurse visit to be scheduled today. Patient has follow up visits scheduled and confirmed that he has transportation to visits. Denies any acute needs at present time. Agreeable to f/u calls. Educated on the use of urgent care or physicians 24 hr access line if assistance is needed after hours. CTN provided contact information. Plan for follow up call in 7-10 days based on severity of symptoms and risk factors.   Sandra

## 2021-08-31 ENCOUNTER — TELEPHONE (OUTPATIENT)
Dept: INFECTIOUS DISEASES | Age: 36
End: 2021-08-31

## 2021-08-31 LAB
A/G RATIO: 0.7 (ref 1–2.1)
ALBUMIN SERPL-MCNC: 3.1 G/DL (ref 3.5–5)
ALP BLD-CCNC: 169 U/L (ref 33–140)
ALT SERPL-CCNC: 125 U/L (ref 0–60)
ANION GAP SERPL CALCULATED.3IONS-SCNC: 15 MMOL/L (ref 5–13)
AST SERPL-CCNC: 66 U/L (ref 0–40)
BILIRUB SERPL-MCNC: 0.2 MG/DL (ref 0.2–1.2)
BUN / CREAT RATIO: 19
BUN BLDV-MCNC: 15 MG/DL (ref 7–25)
CALCIUM SERPL-MCNC: 8.5 MG/DL (ref 8.8–10.9)
CHLORIDE BLD-SCNC: 101 MMOL/L (ref 98–110)
CO2: 23 MMOL/L (ref 22–29)
CREAT SERPL-MCNC: 0.81 MG/DL (ref 0.5–1.3)
GFR AFRICAN AMERICAN: 132 SEE NOTE
GFR NON-AFRICAN AMERICAN: 109 SEE NOTE
GLOBULIN: 4.3 G/DL (ref 2–3.7)
GLUCOSE BLD-MCNC: 82 MG/DL (ref 71–99)
POTASSIUM SERPL-SCNC: 5.2 MMOL/L (ref 3.5–5.1)
SODIUM BLD-SCNC: 139 MMOL/L (ref 135–146)
TOTAL PROTEIN: 7.4 G/DL (ref 6–8)

## 2021-08-31 NOTE — TELEPHONE ENCOUNTER
Spoke with patient spouse Rodolfo Cotto and are wanting to speak with MD about labs that were just drawn.       Rodolfo Cotto [phone]

## 2021-08-31 NOTE — TELEPHONE ENCOUNTER
Called pt and his wife --    Labs today with abnormal -  WBC 12.8, Hbg 7.9, plt 1,417    Hx Streptococcus constellatus empyema  S/p VAT 8/24  Discharged on 8/27 on iv invanz    plt 956k on 8/27  Pt feels well, has improving resp status.  + hx CVA related to PFO    Spoke to Dr Valente Courtney, [facility]  He felt this is reactive, low risk for clumping, recommended monitoring  Discussed with pt and his wife  Repeat CBC on 9/2

## 2021-08-31 NOTE — TELEPHONE ENCOUNTER
Received a call from [facility] with a critical Platelet Level.    8/31/21 Platelets [phone]

## 2021-09-01 NOTE — PROGRESS NOTES
[facility]  Cardiology Progress Note    Haylee Mitchell  [birth year]    September 2, 2021      Reason for Referral: CVA s/p PFO closure     CC: \"I was really sick. \"      Subjective:     History of Present Illness:    Haylee Mitchell is a 30 y.o. patient with a PMH significant for significant no medical problems presenting to the [facility] 3/2019 with increasing acute headache followed by vision loss and decreased peripheral vision. No numbness no tingling. These symptoms started acutely without any aggravating factors. There were no relieving factors. A CT and MRI investigation revealed new stroke with occlusion of left PCA stroke and left ischemic posterior temporal stroke. S/p PFO closure 5/9/19. Interval history: Today, he is here for follow up s/p PFO. He was recently hospitalized for a bacterial infection with unknown reason. He says that his white count his going back down. He has a PICC line and continues to get IV antibiotics. He does report increased fatigue but says that it is improving. Patient denies exertional chest pain/pressure, dyspnea at rest, CASAS, PND, orthopnea, palpitations, lightheadedness, weight changes, changes in LE edema, and syncope. Patient reports compliance to his medications. Past Medical History:   has a past medical history of CVA (cerebral vascular accident) ([address] Hernia, History of bone graft, PFO (patent foramen ovale), and TMJ (dislocation of temporomandibular joint). Surgical History:   has a past surgical history that includes bone graft; Hand surgery; Anterior cruciate ligament repair (Left, 09/10/2014); Cardiac surgery (2019); pre-malignant / benign skin lesion excision (N/A, 09/18/2020); transesophageal echocardiogram (08/19/2021); CT GUIDED PLEURAL DRAINAGE W CATH PERC (8/19/2021); and Thoracoscopy (Left, 8/24/2021). Social History:   reports that he quit smoking about 2 years ago. His smoking use included cigarettes.  He smoked 0.33 packs per From: Bev Hernandez  To: Leigh Ann Goldman  Sent: 1/12/2021 12:10 PM CST  Subject: Referral Request    Dr Goldman,  I have an appointment with Dr Nielson on February 4th. I’m also supposed to get bloodwork and a Prolia shot. I’m not sure if I need referrals for each, which would be 3 referrals. I am attaching a copy of the bloodwork she ordered. The lab would not take a paper copy last time I tried so could you order the bloodwork?  Thank you,  Bev Hernandez   day. He has quit using smokeless tobacco.  His smokeless tobacco use included snuff. He reports current alcohol use of about 6.0 standard drinks of alcohol per week. He reports that he does not use drugs. Family History:  family history includes Cancer in his maternal grandfather, maternal grandmother, paternal grandfather, and paternal grandmother; Heart Failure in his maternal grandfather; High Blood Pressure in his paternal grandmother; Other in his paternal grandfather. Home Medications:  Were reviewed and are listed in nursing record and/or below  Prior to Admission medications    Medication Sig Start Date End Date Taking? Authorizing Provider   gabapentin (NEURONTIN) 100 MG capsule Take 1 capsule by mouth 3 times daily for 3 days. 8/27/21 9/2/21 Yes Cintia Moffett MD   metoprolol tartrate (LOPRESSOR) 25 MG tablet Take 1 tablet by mouth 2 times daily 8/27/21  Yes Cintia Moffett MD   famotidine (PEPCID) 20 MG tablet Take 1 tablet by mouth 2 times daily 8/27/21  Yes Cintia Moffett MD   aspirin EC 81 MG EC tablet Take 1 tablet by mouth daily 11/3/20  Yes Urszula Ponce MD        CURRENT Medications:  No current facility-administered medications for this visit. Allergies:  Patient has no known allergies. Review of Systems: all reviewed and refer to HPI   · Constitutional: no unanticipated weight loss. There's been no change in energy level, sleep pattern, or activity level. No fevers, chills. · Eyes: No visual changes or diplopia. No scleral icterus. · ENT: No Headaches, hearing loss or vertigo. No mouth sores or sore throat. · Cardiovascular: + Chest pain, tightness or discomfort and CASAS.  No Shortness of breath. No Dyspnea on exertion, Orthopnea, Paroxysmal nocturnal dyspnea or breathlessness at rest.   No Palpitations.  No Syncope ('blackouts', 'faints', 'collapse') or dizziness. · Respiratory: No cough or wheezing, no sputum production. No hematemesis.     · Gastrointestinal: No abdominal pain, appetite loss, blood in stools. No change in bowel or bladder habits. · Genitourinary: No dysuria, trouble voiding, or hematuria. · Musculoskeletal:  No gait disturbance, no joint complaints. · Integumentary: No rash or pruritis. · Neurological: No headache, diplopia, change in muscle strength, numbness or tingling. · Psychiatric: No anxiety or depression. · Endocrine: No temperature intolerance. No excessive thirst, fluid intake, or urination. No tremor. · Hematologic/Lymphatic: No abnormal bruising or bleeding, blood clots or swollen lymph nodes. · Allergic/Immunologic: No nasal congestion or hives. Objective:     PHYSICAL EXAM:      Vitals:    09/02/21 1244   BP: 118/74   Pulse: 75   SpO2: 99%   Weight: 157 lb (71.2 kg)   Height: 6' (1.829 m)      Weight: 157 lb (71.2 kg)       General Appearance:  Alert, cooperative, no distress, appears stated age. Head:  Normocephalic, without obvious abnormality, atraumatic. Eyes:  Pupils equal and round. No scleral icterus. Mouth: Moist mucosa, no pharyngeal erythema. Nose: Nares normal. No drainage or sinus tenderness. Neck: Supple, symmetrical, trachea midline. No adenopathy. No tenderness/mass/nodules. No carotid bruit or elevated JVD. Lungs:   Respiratory Effort: Normal   Auscultation: Clear to auscultation bilaterally, respirations unlabored. No wheeze, rales   Chest Wall:  No tenderness or deformity. Cardiovascular:    Pulses  Palpation: normal   Ascultation: Regular rate, S1/ S2 normal. No murmur, rub, or gallop. 2+ radial and pedal pulses, symmetric  Carotid  Femoral   Abdomen and Gastrointestinal:   Soft, non-tender, bowel sounds active. Liver and Spleen  Masses   Musculoskeletal: No muscle wasting  Back  Gait   Extremities: Extremities normal, atraumatic. No cyanosis or edema. No cyanosis clubbing       Skin: Inspection and palpation performed, no rashes or lesions. Pysch: Normal mood and affect.  Alert and oriented to time place person   Neurologic: Normal gross motor and sensory exam.       Labs     All labs have been reviewed    Lab Results   Component Value Date    WBC 10.4 09/02/2021    RBC 3.16 09/02/2021    HGB 10.0 09/02/2021    HCT 29.3 09/02/2021    MCV 92.9 09/02/2021    RDW 15.6 09/02/2021    PLT 1,189 09/02/2021     Lab Results   Component Value Date     08/31/2021    K 5.2 08/31/2021    K 4.6 08/26/2021     08/31/2021    CO2 23 08/31/2021    BUN 15 08/31/2021    CREATININE 0.81 08/31/2021    GFRAA 132 08/31/2021    GFRAA >60 03/07/2010    AGRATIO 0.7 08/31/2021    LABGLOM 109 08/31/2021    GLUCOSE 82 08/31/2021    PROT 7.4 08/31/2021    PROT 7.0 03/07/2010    CALCIUM 8.5 08/31/2021    BILITOT 0.2 08/31/2021    ALKPHOS 169 08/31/2021    AST 66 08/31/2021     08/31/2021     No results found for: PTINR  Lab Results   Component Value Date    LABA1C 5.6 08/14/2020     Lab Results   Component Value Date    TROPONINI <0.01 08/14/2021       Cardiac, Vascular and Imaging Data all Personally Reviewed in Detail by Myself         Echo:6/19/19  Summary  Normal left ventricular systolic function with a visually estimated ejection  fraction of 55%. No regional wall motion abnormalities are seen. Normal left ventricular diastolic filling pressure. Mild mitral regurgitation. A 25 mm Amplatzer closure device is visualized and appears well seated with  no evidence of shunting noted by color flow doppler of the interatrial  septum. A bubble study was performed and fails to show evidence of shunting. ECHO 6/2/2020  Post Amplater closure device (s/p one year). Normal left ventricular systolic function with a visually estimated ejection  fraction of 55%. No regional wall motion abnormalities are seen. Normal left ventricular diastolic filling pressure. Mild mitral and tricuspid regurgitation. Systolic pulmonary artery pressure (SPAP) is normal and estimated at 20 mmHg  (right atrial pressure 3 mmHg).   A 25 mm Amplatzer closure device is visualized and appears well seated with  no evidence of shunting noted by color flow doppler of the interatrial  septum. A bubble study was performed and fails to show evidence of shunting. ECHO 5/11/2021  A 25 mm Amplatzer closure device is visualized and appears well seated with  no evidence of shunting noted by color flow doppler of the interatrial  septum. An agitated saline study was performed and showed a delayed (>5 beats)  positive result suggestive of an intrapulmonary shunt. Normal left ventricle size, wall thickness, and systolic function with a  visually estimated ejection fraction of 55%. No regional wall motion abnormalities are seen. Normal left ventricular diastolic filling pressure. Inadequate tricuspid valve regurgitation to estimate systolic pulmonary  artery pressure. ECHO 8/18/2021  Normal LV systolic function with a visually estimated ejection fraction of  55-60%. No regional wall motion abnormalities are seen. EF calculated by 3D at 59%. A 25 mm Amplatzer closure device is visualized and appears well seated. No  obvious shunt from color flow. Normal left ventricular diastolic filling pressure. Mild eccentric tricuspid regurgitation. Systolic pulmonary artery pressure (SPAP) is elevated and estimated at 60  mmHg (RAP 15 mmHg) c/w mod-severe pulmonary hypertension. No obvious masses, thrombi, or vegetations are noted. GARDENIA 8/19/2021  A 25 mm Amplatzer closure device is visualized and appears well seated with  no evidence of shunting noted by color flow doppler of the interatrial  septum. An agitated saline study was performed and showed a delayed (>5 beats)  positive result suggestive of an intrapulmonary shunt. Normal left ventricle size, wall thickness, and systolic function with a  visually estimated ejection fraction of 55%. No regional wall motion abnormalities are seen. Normal left ventricular diastolic filling pressure.   Inadequate tricuspid valve regurgitation to estimate systolic pulmonary  artery pressure. Assessment and Plan      PFO s/p CVA   S/p PFO closure 25 mm occluder device 5/9/19. Asymptomatic. Continue Asa and statin. Recent hospitalization for bacterial infection. Repeat GARDENIA in 6 weeks to assess device. Smoking addiction   He has now quit smoking. Follow up in 1 year or sooner. Thank you for allowing us to participate in the care of Mery Andrade. Please do not hesitate to contact me if you have any questions. Emmy Agrawal MD, MPH    [address] [place] [address]  Ph: [phone]  Fax: [phone]    This note was scribed in the presence of Dr Krista Craig, by Rhina Maldonado RN  . Physician Attestation:  The scribes documentation has been prepared under my direction and personally reviewed by me in its entirety. I confirm that the note above accurately reflects all work, treatment, procedures, and medical decision making performed by me.

## 2021-09-02 ENCOUNTER — HOSPITAL ENCOUNTER (OUTPATIENT)
Age: 36
Setting detail: SPECIMEN
Discharge: HOME | End: 2021-09-02
Payer: COMMERCIAL

## 2021-09-02 ENCOUNTER — OFFICE VISIT (OUTPATIENT)
Dept: CARDIOLOGY CLINIC | Age: 36
End: 2021-09-02
Payer: COMMERCIAL

## 2021-09-02 VITALS
DIASTOLIC BLOOD PRESSURE: 74 MMHG | SYSTOLIC BLOOD PRESSURE: 118 MMHG | HEIGHT: 72 IN | BODY MASS INDEX: 21.26 KG/M2 | HEART RATE: 75 BPM | WEIGHT: 157 LBS | OXYGEN SATURATION: 99 %

## 2021-09-02 DIAGNOSIS — I63.332 CEREBROVASCULAR ACCIDENT (CVA) DUE TO THROMBOSIS OF LEFT POSTERIOR CEREBRAL ARTERY (HCC): Primary | ICD-10-CM

## 2021-09-02 DIAGNOSIS — Q21.12 PFO (PATENT FORAMEN OVALE): ICD-10-CM

## 2021-09-02 LAB
BANDED NEUTROPHILS RELATIVE PERCENT: 1 % (ref 0–7)
BASOPHILS ABSOLUTE: 0 K/UL (ref 0–0.2)
BASOPHILS RELATIVE PERCENT: 0 %
EOSINOPHILS ABSOLUTE: 0 K/UL (ref 0–0.6)
EOSINOPHILS RELATIVE PERCENT: 0 %
HCT VFR BLD CALC: 29.3 % (ref 40.5–52.5)
HEMATOLOGY PATH CONSULT: YES
HEMOGLOBIN: 10 G/DL (ref 13.5–17.5)
LYMPHOCYTES ABSOLUTE: 3.7 K/UL (ref 1–5.1)
LYMPHOCYTES RELATIVE PERCENT: 36 %
MCH RBC QN AUTO: 31.6 PG (ref 26–34)
MCHC RBC AUTO-ENTMCNC: 34 G/DL (ref 31–36)
MCV RBC AUTO: 92.9 FL (ref 80–100)
MONOCYTES ABSOLUTE: 0.4 K/UL (ref 0–1.3)
MONOCYTES RELATIVE PERCENT: 4 %
NEUTROPHILS ABSOLUTE: 6.2 K/UL (ref 1.7–7.7)
NEUTROPHILS RELATIVE PERCENT: 59 %
PDW BLD-RTO: 15.6 % (ref 12.4–15.4)
PLATELET # BLD: 1189 K/UL (ref 135–450)
PLATELET SLIDE REVIEW: ABNORMAL
PMV BLD AUTO: 6.5 FL (ref 5–10.5)
RBC # BLD: 3.16 M/UL (ref 4.2–5.9)
SLIDE REVIEW: ABNORMAL
WBC # BLD: 10.4 K/UL (ref 4–11)

## 2021-09-02 PROCEDURE — 36415 COLL VENOUS BLD VENIPUNCTURE: CPT

## 2021-09-02 PROCEDURE — 85025 COMPLETE CBC W/AUTO DIFF WBC: CPT

## 2021-09-02 PROCEDURE — 99213 OFFICE O/P EST LOW 20 MIN: CPT | Performed by: INTERNAL MEDICINE

## 2021-09-02 PROCEDURE — 93000 ELECTROCARDIOGRAM COMPLETE: CPT | Performed by: INTERNAL MEDICINE

## 2021-09-02 NOTE — PATIENT INSTRUCTIONS
[facility] ORTHOPEDIC AND SPINE [facility]   The morning of your Procedure-GARDENIA you will park in the hospital parking lot and report directly to the cath lab to check in. DATE: ____________ TIME: _____________      Pre-Procedure Instructions:  1. Do not eat or drink anything 8 hours before your procedure. 2. Hold all diabetic medications the morning of the procedure including, Metfomin. 3. If you take Lantus/Levemir only take ½ your normal dose the evening before. 4. All other medications can be taken in the morning with sips of water. 5. Do not hold anticoagulation therapy: warfarin, eliquis, xarelto therapy. 6. Do not use any lotions, creams or perfume the morning of procedure. 7. Please arrive 1 hour prior to procedure time. 8. Please have a responsible adult to drive you home after procedure. It is recommended you do not drive for 24 hours after procedure. 9. Cath lab will provide you with all post procedure instructions. If you have any questions regarding the procedure itself or medications please call [phone] and ask to speak with a nurse.

## 2021-09-03 LAB — HEMATOLOGY PATH CONSULT: NORMAL

## 2021-09-07 ENCOUNTER — TELEPHONE (OUTPATIENT)
Dept: INFECTIOUS DISEASES | Age: 36
End: 2021-09-07

## 2021-09-07 ENCOUNTER — CARE COORDINATION (OUTPATIENT)
Dept: CASE MANAGEMENT | Age: 36
End: 2021-09-07

## 2021-09-07 ENCOUNTER — HOSPITAL ENCOUNTER (OUTPATIENT)
Age: 36
Setting detail: SPECIMEN
Discharge: HOME | End: 2021-09-07
Payer: COMMERCIAL

## 2021-09-07 LAB
A/G RATIO: 0.9 (ref 1.1–2.2)
ALBUMIN SERPL-MCNC: 3.6 G/DL (ref 3.4–5)
ALP BLD-CCNC: 130 U/L (ref 40–129)
ALT SERPL-CCNC: 50 U/L (ref 10–40)
ANION GAP SERPL CALCULATED.3IONS-SCNC: 10 MMOL/L (ref 3–16)
AST SERPL-CCNC: 28 U/L (ref 15–37)
BASOPHILS ABSOLUTE: 0.1 K/UL (ref 0–0.2)
BASOPHILS RELATIVE PERCENT: 0.9 %
BILIRUB SERPL-MCNC: <0.2 MG/DL (ref 0–1)
BUN BLDV-MCNC: 12 MG/DL (ref 7–20)
CALCIUM SERPL-MCNC: 9.2 MG/DL (ref 8.3–10.6)
CHLORIDE BLD-SCNC: 103 MMOL/L (ref 99–110)
CO2: 27 MMOL/L (ref 21–32)
CREAT SERPL-MCNC: 0.8 MG/DL (ref 0.9–1.3)
EOSINOPHILS ABSOLUTE: 0.1 K/UL (ref 0–0.6)
EOSINOPHILS RELATIVE PERCENT: 1.6 %
GFR AFRICAN AMERICAN: >60
GFR NON-AFRICAN AMERICAN: >60
GLOBULIN: 3.8 G/DL
GLUCOSE BLD-MCNC: 96 MG/DL (ref 70–99)
HCT VFR BLD CALC: 32.4 % (ref 40.5–52.5)
HEMOGLOBIN: 10.8 G/DL (ref 13.5–17.5)
LYMPHOCYTES ABSOLUTE: 2.3 K/UL (ref 1–5.1)
LYMPHOCYTES RELATIVE PERCENT: 38.8 %
MCH RBC QN AUTO: 29.8 PG (ref 26–34)
MCHC RBC AUTO-ENTMCNC: 33.3 G/DL (ref 31–36)
MCV RBC AUTO: 89.3 FL (ref 80–100)
MONOCYTES ABSOLUTE: 0.5 K/UL (ref 0–1.3)
MONOCYTES RELATIVE PERCENT: 8.1 %
NEUTROPHILS ABSOLUTE: 3.1 K/UL (ref 1.7–7.7)
NEUTROPHILS RELATIVE PERCENT: 50.6 %
PDW BLD-RTO: 15 % (ref 12.4–15.4)
PLATELET # BLD: 742 K/UL (ref 135–450)
PMV BLD AUTO: 6.5 FL (ref 5–10.5)
POTASSIUM SERPL-SCNC: 4.8 MMOL/L (ref 3.5–5.1)
RBC # BLD: 3.63 M/UL (ref 4.2–5.9)
SODIUM BLD-SCNC: 140 MMOL/L (ref 136–145)
TOTAL PROTEIN: 7.4 G/DL (ref 6.4–8.2)
WBC # BLD: 6.1 K/UL (ref 4–11)

## 2021-09-07 PROCEDURE — 36415 COLL VENOUS BLD VENIPUNCTURE: CPT

## 2021-09-07 PROCEDURE — 85025 COMPLETE CBC W/AUTO DIFF WBC: CPT

## 2021-09-07 PROCEDURE — 80053 COMPREHEN METABOLIC PANEL: CPT

## 2021-09-07 NOTE — CARE COORDINATION
[facility] Transitions Follow Up Call    2021    Patient: Austin Sorensen  Patient : 1985   MRN: [id number]  Reason for Admission: strep  Discharge Date: 21 RARS: Readmission Risk Score: 19         Spoke with: Austin Sorensen    Patient answered call and verified . Patient pleasant and agreeable to transition call. Patient with soreness on chest wall side and insomnia. Patient not sleeping well at night due to night sweats, but denied fevers. CTN encouraged patient to reach out to PCP or infectious disease MD (Dr Cydney Palomares) to discuss symptoms and possible reason. patient continues to have PICC and IV antibiotic infusions. Patient is tolerating well. Patient has home care still active and blood was drawn this morning. Patient has upcoming MD visit this week and confirms transportation. Denies any acute needs at present time. Agreeable to f/u calls. Educated on the use of urgent care or physicians 24 hr access line if assistance is needed after hours. Jina Dukes RN   Care Transition Nurse  [phone]        Care Transitions Subsequent and Final Call    Subsequent and Final Calls  Do you have any ongoing symptoms?: No  Have your medications changed?: No  Do you have any questions related to your medications?: No  Do you currently have any active services?: Yes  Are you currently active with any services?: Home Health  Do you have any needs or concerns that I can assist you with?: No  Identified Barriers: None  Care Transitions Interventions  Other Interventions:            Follow Up  Future Appointments   Date Time Provider Oleg Lynn   2021  1:00 PM JAMES Farias - CNP [facility] [place] [facility]   2021 10:15 AM MD [facility]       Jina Dukes RN

## 2021-09-07 NOTE — TELEPHONE ENCOUNTER
Patient was calling for f/u visit from d/c from hospital 8/28. First available in person visit is 10/12 and first VV is 9/30. Pt states he is due to end on 9/24. Please advise.   Thank you

## 2021-09-07 NOTE — TELEPHONE ENCOUNTER
He has another appointment scheduled already with Dr Julio Membreno at [address] [place] [address] on 9/9

## 2021-09-08 ENCOUNTER — TELEPHONE (OUTPATIENT)
Dept: CARDIOTHORACIC SURGERY | Age: 36
End: 2021-09-08

## 2021-09-08 NOTE — TELEPHONE ENCOUNTER
Called patient to follow up after discharge from hospital recently. Patient has upcoming appointment with Dr. Effie Woods on Monday 9/13. Patient reports overall he feels well, and feels his recovery is going as expected. He reports mild soreness at incision sites, but denies any issues such as swelling, redness, or drainage. His s/o is a nurse, and he also has [facility] coming out to help him keep his incisions clean. He denies SOB or coughing. He states he is using his incentive spirometer, but probably not as often as he should. Encouraged him to utilize more frequently in order to assist with lung recovery. Patient reports he is exercising, he is taking walks and riding a bike. His appetite is good, and has no other complaints. He did mention he was having some trouble sleeping when he first got home from hospital, but this issue has since resolved. Encouraged patient to call with any questions or concerns before office visit Monday. Patient agreeable.

## 2021-09-09 ENCOUNTER — OFFICE VISIT (OUTPATIENT)
Dept: FAMILY MEDICINE CLINIC | Age: 36
End: 2021-09-09
Payer: COMMERCIAL

## 2021-09-09 VITALS
BODY MASS INDEX: 22.4 KG/M2 | DIASTOLIC BLOOD PRESSURE: 72 MMHG | HEIGHT: 72 IN | HEART RATE: 78 BPM | SYSTOLIC BLOOD PRESSURE: 100 MMHG | OXYGEN SATURATION: 98 % | WEIGHT: 165.4 LBS

## 2021-09-09 DIAGNOSIS — Z76.89 ENCOUNTER TO ESTABLISH CARE: ICD-10-CM

## 2021-09-09 DIAGNOSIS — R12 HEARTBURN: ICD-10-CM

## 2021-09-09 DIAGNOSIS — I63.9 CEREBROVASCULAR ACCIDENT (CVA), UNSPECIFIED MECHANISM (HCC): Primary | ICD-10-CM

## 2021-09-09 DIAGNOSIS — J02.0 PHARYNGITIS DUE TO STREPTOCOCCUS SPECIES: ICD-10-CM

## 2021-09-09 PROCEDURE — 99214 OFFICE O/P EST MOD 30 MIN: CPT | Performed by: NURSE PRACTITIONER

## 2021-09-09 PROCEDURE — 1111F DSCHRG MED/CURRENT MED MERGE: CPT | Performed by: NURSE PRACTITIONER

## 2021-09-09 RX ORDER — FAMOTIDINE 20 MG/1
20 TABLET, FILM COATED ORAL 2 TIMES DAILY
Qty: 180 TABLET | Refills: 1 | Status: SHIPPED | OUTPATIENT
Start: 2021-09-09 | End: 2021-10-19

## 2021-09-09 ASSESSMENT — PATIENT HEALTH QUESTIONNAIRE - PHQ9
SUM OF ALL RESPONSES TO PHQ QUESTIONS 1-9: 0
2. FEELING DOWN, DEPRESSED OR HOPELESS: 0
SUM OF ALL RESPONSES TO PHQ QUESTIONS 1-9: 0
SUM OF ALL RESPONSES TO PHQ QUESTIONS 1-9: 0
SUM OF ALL RESPONSES TO PHQ9 QUESTIONS 1 & 2: 0
1. LITTLE INTEREST OR PLEASURE IN DOING THINGS: 0

## 2021-09-09 NOTE — PROGRESS NOTES
Ear: External ear normal.      Left Ear: External ear normal.      Nose: Nose normal.      Mouth/Throat:      Mouth: Mucous membranes are moist.      Pharynx: Oropharynx is clear. No oropharyngeal exudate or posterior oropharyngeal erythema. Eyes:      Conjunctiva/sclera: Conjunctivae normal.   Cardiovascular:      Rate and Rhythm: Normal rate and regular rhythm. Pulses: Normal pulses. Heart sounds: Normal heart sounds. No murmur heard. Pulmonary:      Effort: Pulmonary effort is normal.      Breath sounds: Normal breath sounds. No wheezing or rhonchi. Abdominal:      General: Bowel sounds are normal.      Palpations: Abdomen is soft. Tenderness: There is no abdominal tenderness. Musculoskeletal:         General: Normal range of motion. Cervical back: Normal range of motion. Skin:     General: Skin is warm and dry. Comments: Healed chest tube site noted to lateral chest as well as a healed surgical incision to the left upper flank. Neurological:      General: No focal deficit present. Mental Status: He is alert and oriented to person, place, and time. Psychiatric:         Mood and Affect: Mood normal.         Behavior: Behavior normal.         Thought Content: Thought content normal.         Judgment: Judgment normal.       Vitals:    09/09/21 1301   BP: 100/72   Pulse: 78   SpO2: 98%       Assessment:  Encounter Diagnoses   Name Primary?  Cerebrovascular accident (CVA), unspecified mechanism ([address] Yes    Pharyngitis due to Streptococcus species     Heartburn        Controlled SubstancesMonitoring:  NA    Plan:  1. Cerebrovascular accident (CVA), unspecified mechanism ([address]  Stable  - [place] DISCHAR[place] MEDS RECONCILED W/ CURRENT OUTPATIENT MED LIST    2. Pharyngitis due to Streptococcus species  Resolved  - [place] DISCHARGE MEDS RECONCILED W/ CURRENT OUTPATIENT MED LIST    3. Heartburn  Stable  - famotidine (PEPCID) 20 MG tablet;  Take 1 tablet by mouth 2 times daily Dispense: 180 tablet; Refill: 1    4. Encounter to establish care  Establish care      Luisstad, APRN - CNP, KIRSTEN    Reviewed treatment plan with patient. Patient verbalized understanding to treatment plan and questions were answered. [address] [facility] [address]  Roxanne, [address] [place] [address]

## 2021-09-10 ENCOUNTER — TELEPHONE (OUTPATIENT)
Dept: CASE MANAGEMENT | Age: 36
End: 2021-09-10

## 2021-09-10 DIAGNOSIS — R91.8 PULMONARY NODULES: Primary | ICD-10-CM

## 2021-09-10 NOTE — TELEPHONE ENCOUNTER
Pt returned call. He does not have a pulmonologist, could you please place referral and call him back with the information.

## 2021-09-10 NOTE — TELEPHONE ENCOUNTER
It looks like pt established with you 9/9/2021.     Per imaging report CT CHEST 8/23/2021 with F/U imaging recommendations:     Nodule size greater than 8 mm           In a low-risk patient, consider CT at 3 months, PET/CT, or tissue sampling.       In a high-risk patient, consider CT at 3 months, PET/CT, or tissue sampling.       Multiple Solid Nodules:          May also consider Pulmonology referral for lung nodules greater than 6mm.     Thank you,  Breana Alford RN  [address] [place] [address]  [phone]

## 2021-09-10 NOTE — TELEPHONE ENCOUNTER
Can you call him and ask him if he does have a pulmonologist specialist and if he does not I will put the referral in.

## 2021-09-13 ENCOUNTER — TELEPHONE (OUTPATIENT)
Dept: CARDIOLOGY CLINIC | Age: 36
End: 2021-09-13

## 2021-09-13 ENCOUNTER — OFFICE VISIT (OUTPATIENT)
Dept: CARDIOTHORACIC SURGERY | Age: 36
End: 2021-09-13

## 2021-09-13 ENCOUNTER — HOSPITAL ENCOUNTER (OUTPATIENT)
Age: 36
Setting detail: SPECIMEN
Discharge: HOME | End: 2021-09-13
Payer: COMMERCIAL

## 2021-09-13 VITALS
HEIGHT: 72 IN | DIASTOLIC BLOOD PRESSURE: 80 MMHG | TEMPERATURE: 98.3 F | SYSTOLIC BLOOD PRESSURE: 118 MMHG | HEART RATE: 76 BPM | WEIGHT: 169.4 LBS | BODY MASS INDEX: 22.94 KG/M2 | OXYGEN SATURATION: 98 %

## 2021-09-13 DIAGNOSIS — J98.11 ATELECTASIS OF LEFT LUNG: Primary | ICD-10-CM

## 2021-09-13 LAB
BASOPHILS ABSOLUTE: 0 K/UL (ref 0–0.2)
BASOPHILS RELATIVE PERCENT: 0.4 %
EOSINOPHILS ABSOLUTE: 0.1 K/UL (ref 0–0.6)
EOSINOPHILS RELATIVE PERCENT: 1.2 %
HCT VFR BLD CALC: 31.8 % (ref 40.5–52.5)
HEMOGLOBIN: 10.6 G/DL (ref 13.5–17.5)
LYMPHOCYTES ABSOLUTE: 2.1 K/UL (ref 1–5.1)
LYMPHOCYTES RELATIVE PERCENT: 27.5 %
MCH RBC QN AUTO: 30.2 PG (ref 26–34)
MCHC RBC AUTO-ENTMCNC: 33.3 G/DL (ref 31–36)
MCV RBC AUTO: 90.8 FL (ref 80–100)
MONOCYTES ABSOLUTE: 0.6 K/UL (ref 0–1.3)
MONOCYTES RELATIVE PERCENT: 8.1 %
NEUTROPHILS ABSOLUTE: 4.8 K/UL (ref 1.7–7.7)
NEUTROPHILS RELATIVE PERCENT: 62.8 %
PDW BLD-RTO: 15.6 % (ref 12.4–15.4)
PLATELET # BLD: 420 K/UL (ref 135–450)
PMV BLD AUTO: 7.3 FL (ref 5–10.5)
RBC # BLD: 3.5 M/UL (ref 4.2–5.9)
WBC # BLD: 7.7 K/UL (ref 4–11)

## 2021-09-13 PROCEDURE — 80053 COMPREHEN METABOLIC PANEL: CPT

## 2021-09-13 PROCEDURE — 36415 COLL VENOUS BLD VENIPUNCTURE: CPT

## 2021-09-13 PROCEDURE — 85025 COMPLETE CBC W/AUTO DIFF WBC: CPT

## 2021-09-13 PROCEDURE — 99024 POSTOP FOLLOW-UP VISIT: CPT

## 2021-09-13 RX ORDER — CYCLOBENZAPRINE HCL 5 MG
5 TABLET ORAL 3 TIMES DAILY PRN
COMMUNITY

## 2021-09-13 NOTE — TELEPHONE ENCOUNTER
[facility] ORTHOPEDIC AND SPINE [facility] [place]   The morning of your Procedure-GARDENIA you will park in the hospital parking lot and report directly to the cath lab to check in. DATE: 10/19/2021 TIME: 7:30      Pre-Procedure Instructions:  1. Do not eat or drink anything 8 hours before your procedure. 2. Hold all diabetic medications the morning of the procedure including, Metfomin. 3. If you take Lantus/Levemir only take ½ your normal dose the evening before. 4. All other medications can be taken in the morning with sips of water. 5. Do not hold anticoagulation therapy: warfarin, eliquis, xarelto therapy. 6. Do not use any lotions, creams or perfume the morning of procedure. 7. Please arrive 1 hour prior to procedure time. 8. Please have a responsible adult to drive you home after procedure. It is recommended you do not drive for 24 hours after procedure. 9. Cath lab will provide you with all post procedure instructions. 10. Please get Covid test no more than 6 days and bring results the day of procedure. If you have any questions regarding the procedure itself or medications please call [phone] and ask to speak with a nurse. Called and spoke to Miky Johnson in the [facility] cath lab. Provided her with patient information. Ebony mcbride. Called patient and he is agreeable to date/time.  He asked that I send instructions through [address]

## 2021-09-13 NOTE — LETTER
[facility] Cardiothoracic Surgery  [address] [address]  Phone: [phone]  Fax: [phone]           Katerina Leos MD      September 13, 2021     Patient: Haylee Mitchell   MR Number: [id number]   YOB: 1985   Date of Visit: 9/13/2021       Dear Dr. Rice Neat:    Thank you for referring Ant Srinivasan to me for evaluation/treatment. Below are the relevant portions of my assessment and plan of care. If you have questions, please do not hesitate to call me. I look forward to following Herminio Luevano along with you. Sincerely,        Katerina Leos MD    CC providers:  Alan Camp MD  [address] [place] [address] [address] [facility] [address]  Via In [facility] [address]

## 2021-09-13 NOTE — PROGRESS NOTES
Cardiac, Vascular and Thoracic Surgeons  Clinic Note     9/13/2021 10:19 AM  Surgeon:  Margi Layne     S/P :  1st post-op s/p LVATS w/ decortication & drainage of lung abscess, & CT placement on 8/24/21. Chief complaint : 1st post-op  Subjective:  Mr. Aide Maxwell is doing well post-operatively. He is getting around well. He still has PICC line in place LUE. Receiving IV abx daily with management by Dr. María Milligan. Denies SOB. Reports some discomfort on the left side r/t incision. Left side incisions healing well, well-approximated, no redness or drainage. No other complaints at this time. Vital Signs: /80 (Site: Right Upper Arm, Position: Sitting)   Pulse 76   Temp 98.3 °F (36.8 °C) (Temporal)   Ht 6' (1.829 m)   Wt 169 lb 6.4 oz (76.8 kg)   SpO2 98%   BMI 22.97 kg/m²      I/O:  No intake or output data in the 24 hours ending 09/13/21 1019    Exam:   Cardiovascular: Clear S1, S2.  Pulmonary: CTAB    Lower extremity: No peripheral edema. Incision: L thoracic incisions CDI      Labs:   CBC: No results for input(s): WBC, HGB, HCT, MCV, PLT in the last 72 hours. BMP: No results for input(s): NA, K, CL, CO2, PHOS, BUN, CREATININE in the last 72 hours. Invalid input(s): CA  PT/INR: No results for input(s): PROTIME, INR in the last 72 hours. APTT: No results for input(s): APTT in the last 72 hours.     Scheduled Meds:     Patient Active Problem List   Diagnosis    Acute medial meniscal tear    ACL tear    [address]vascular accident ([address]A) [address]    Leukocytosis    Head ache    PFO (patent foramen ovale)    Closed displaced fracture of base of fourth metacarpal bone of right hand    Closed displaced fracture of body of hamate of right wrist    Sebaceous cyst    Streptococcal bacteremia    Pharyngitis due to Streptococcus species    S/P patent foramen ovale closure    Loculated pleural effusion    Septic embolism (HCC)    Atelectasis of left lung    Abnormal LFTs    Former smoker    Marijuana smoker

## 2021-09-14 ENCOUNTER — CARE COORDINATION (OUTPATIENT)
Dept: CASE MANAGEMENT | Age: 36
End: 2021-09-14

## 2021-09-14 LAB
A/G RATIO: 1.2 (ref 1.1–2.2)
ALBUMIN SERPL-MCNC: 3.8 G/DL (ref 3.4–5)
ALP BLD-CCNC: 115 U/L (ref 40–129)
ALT SERPL-CCNC: 56 U/L (ref 10–40)
ANION GAP SERPL CALCULATED.3IONS-SCNC: 18 MMOL/L (ref 3–16)
AST SERPL-CCNC: 35 U/L (ref 15–37)
BILIRUB SERPL-MCNC: <0.2 MG/DL (ref 0–1)
BUN BLDV-MCNC: 15 MG/DL (ref 7–20)
CALCIUM SERPL-MCNC: 8.7 MG/DL (ref 8.3–10.6)
CHLORIDE BLD-SCNC: 103 MMOL/L (ref 99–110)
CO2: 21 MMOL/L (ref 21–32)
CREAT SERPL-MCNC: 0.7 MG/DL (ref 0.9–1.3)
GFR AFRICAN AMERICAN: >60
GFR NON-AFRICAN AMERICAN: >60
GLOBULIN: 3.2 G/DL
GLUCOSE BLD-MCNC: 83 MG/DL (ref 70–99)
POTASSIUM SERPL-SCNC: 4.2 MMOL/L (ref 3.5–5.1)
SODIUM BLD-SCNC: 142 MMOL/L (ref 136–145)
TOTAL PROTEIN: 7 G/DL (ref 6.4–8.2)

## 2021-09-14 NOTE — CARE COORDINATION
[facility] Transitions Follow Up Call    2021    Patient: Rhett Jeffery  Patient : 1985   MRN: [id number]  Reason for Admission: s/p VATS  Discharge Date: 21 RARS: Readmission Risk Score: 23         Spoke with: na    Attempted to reach patient via phone for transition call. No option to leave vm. Kirs Rowe RN   Care Transition Nurse  [phone]      Care Transitions Subsequent and Final Call    Subsequent and Final Calls  Care Transitions Interventions  Other Interventions:            Follow Up  Future Appointments   Date Time Provider Oleg Lynn   2021 12:00 PM Tess Oliver MD AND INFCT [facility]   10/19/2021  7:30 AM SCHEDULE, [facility] ECHO [place] [facility] ECHO Ttio Rowe, RN

## 2021-09-17 ENCOUNTER — CARE COORDINATION (OUTPATIENT)
Dept: CASE MANAGEMENT | Age: 36
End: 2021-09-17

## 2021-09-17 NOTE — CARE COORDINATION
[facility] Transitions Follow Up Call    2021    Patient: Arpan Krishna  Patient : 1985   MRN: [id number]  Reason for Admission: s/p VATS  Discharge Date: 21 RARS: Readmission Risk Score: 19         Spoke with: Arpan Krishna    Patient answered call and verified . Patient doing well and has seen Dr Guero Castillo since last contact. Patient stated that MD ordered IV antibiotics to continue for another week. Patient to follow up on 21 to discuss possible change of antibiotics to oral instead of IV. Patient stated that he was seen by Dr Elizabeth Galeana and incisions healed and not longer sore. Patient is trying to increase his activity and build his endurance. Patient is wanting to return to work. Denies any acute needs at present time. Agreeable to f/u calls. Educated on the use of urgent care or physicians 24 hr access line if assistance is needed after hours. Care Transitions Subsequent and Final Call    Subsequent and Final Calls  Do you have any ongoing symptoms?: No  Have your medications changed?: No  Do you have any questions related to your medications?: No  Do you currently have any active services?: Yes  Are you currently active with any services?: Home Health  Do you have any needs or concerns that I can assist you with?: No  Identified Barriers: None  Care Transitions Interventions  Other Interventions:            Follow Up  Future Appointments   Date Time Provider Oleg Lynn   2021 12:00 PM Pinky Calvin MD AND [facility]   10/19/2021  7:30 AM SCHEDULE, [facility] ECHO [place] [facility] ECHO Tito Murrieta, RN

## 2021-09-20 ENCOUNTER — HOSPITAL ENCOUNTER (OUTPATIENT)
Age: 36
Setting detail: SPECIMEN
Discharge: HOME | End: 2021-09-20
Payer: COMMERCIAL

## 2021-09-20 ENCOUNTER — TELEPHONE (OUTPATIENT)
Dept: PULMONOLOGY | Age: 36
End: 2021-09-20

## 2021-09-20 DIAGNOSIS — R91.8 MULTIPLE LUNG NODULES: Primary | ICD-10-CM

## 2021-09-20 LAB
A/G RATIO: 1.3 (ref 1.1–2.2)
ALBUMIN SERPL-MCNC: 4 G/DL (ref 3.4–5)
ALP BLD-CCNC: 114 U/L (ref 40–129)
ALT SERPL-CCNC: 37 U/L (ref 10–40)
ANION GAP SERPL CALCULATED.3IONS-SCNC: 9 MMOL/L (ref 3–16)
AST SERPL-CCNC: 18 U/L (ref 15–37)
BASOPHILS ABSOLUTE: 0.1 K/UL (ref 0–0.2)
BASOPHILS RELATIVE PERCENT: 1 %
BILIRUB SERPL-MCNC: <0.2 MG/DL (ref 0–1)
BUN BLDV-MCNC: 11 MG/DL (ref 7–20)
CALCIUM SERPL-MCNC: 8.6 MG/DL (ref 8.3–10.6)
CHLORIDE BLD-SCNC: 105 MMOL/L (ref 99–110)
CO2: 28 MMOL/L (ref 21–32)
CREAT SERPL-MCNC: 0.8 MG/DL (ref 0.9–1.3)
EOSINOPHILS ABSOLUTE: 0.2 K/UL (ref 0–0.6)
EOSINOPHILS RELATIVE PERCENT: 2.5 %
GFR AFRICAN AMERICAN: >60
GFR NON-AFRICAN AMERICAN: >60
GLOBULIN: 3.2 G/DL
GLUCOSE BLD-MCNC: 124 MG/DL (ref 70–99)
HCT VFR BLD CALC: 36.2 % (ref 40.5–52.5)
HEMOGLOBIN: 11.9 G/DL (ref 13.5–17.5)
LYMPHOCYTES ABSOLUTE: 2.8 K/UL (ref 1–5.1)
LYMPHOCYTES RELATIVE PERCENT: 44.2 %
MCH RBC QN AUTO: 30.2 PG (ref 26–34)
MCHC RBC AUTO-ENTMCNC: 32.8 G/DL (ref 31–36)
MCV RBC AUTO: 91.9 FL (ref 80–100)
MONOCYTES ABSOLUTE: 0.5 K/UL (ref 0–1.3)
MONOCYTES RELATIVE PERCENT: 8.1 %
NEUTROPHILS ABSOLUTE: 2.8 K/UL (ref 1.7–7.7)
NEUTROPHILS RELATIVE PERCENT: 44.2 %
PDW BLD-RTO: 14.7 % (ref 12.4–15.4)
PLATELET # BLD: 318 K/UL (ref 135–450)
PMV BLD AUTO: 7.6 FL (ref 5–10.5)
POTASSIUM SERPL-SCNC: 4.5 MMOL/L (ref 3.5–5.1)
RBC # BLD: 3.94 M/UL (ref 4.2–5.9)
SODIUM BLD-SCNC: 142 MMOL/L (ref 136–145)
TOTAL PROTEIN: 7.2 G/DL (ref 6.4–8.2)
WBC # BLD: 6.3 K/UL (ref 4–11)

## 2021-09-20 PROCEDURE — 85025 COMPLETE CBC W/AUTO DIFF WBC: CPT

## 2021-09-20 PROCEDURE — 36415 COLL VENOUS BLD VENIPUNCTURE: CPT

## 2021-09-20 PROCEDURE — 80053 COMPREHEN METABOLIC PANEL: CPT

## 2021-09-20 NOTE — TELEPHONE ENCOUNTER
Pt has a referral for pulmonary nodules and would like to know when to schedule patient. Please advise.

## 2021-09-21 ENCOUNTER — OFFICE VISIT (OUTPATIENT)
Dept: INFECTIOUS DISEASES | Age: 36
End: 2021-09-21
Payer: COMMERCIAL

## 2021-09-21 ENCOUNTER — TELEPHONE (OUTPATIENT)
Dept: FAMILY MEDICINE CLINIC | Age: 36
End: 2021-09-21

## 2021-09-21 VITALS
TEMPERATURE: 98.1 F | SYSTOLIC BLOOD PRESSURE: 110 MMHG | DIASTOLIC BLOOD PRESSURE: 64 MMHG | BODY MASS INDEX: 22.97 KG/M2 | HEIGHT: 72 IN

## 2021-09-21 DIAGNOSIS — J86.9 EMPYEMA (HCC): Primary | ICD-10-CM

## 2021-09-21 DIAGNOSIS — R78.81 STREPTOCOCCAL BACTEREMIA: ICD-10-CM

## 2021-09-21 DIAGNOSIS — I26.90 ACUTE SEPTIC PULMONARY EMBOLISM WITHOUT ACUTE COR PULMONALE (HCC): ICD-10-CM

## 2021-09-21 DIAGNOSIS — B95.5 STREPTOCOCCAL BACTEREMIA: ICD-10-CM

## 2021-09-21 PROCEDURE — 99214 OFFICE O/P EST MOD 30 MIN: CPT | Performed by: INTERNAL MEDICINE

## 2021-09-21 NOTE — PROGRESS NOTES
Department of Internal Medicine  Infectious Diseases      Patient Name: Chari Dennison      Date of visit: 9/21/2021  SUBJECTIVE:  Bobbi Strange  is a 30 y.o. male who returns today for hospital follow-up. He has history of CVA, PFO s/p closure 5/2019     ED 8/14/21 - several days sore throat, HA, myalgia. +throat GABHS screen for which he was given IM bicillin. Fever persisted. He was called back to the ED the following day when BCx turned +for same GABHS  At that point was having pleuritic chest pain. LFTs were slightly elevated  Admitted for management, 8/15-8/28/21  Developed septic pulmonary emboli and L empyema with isolation S constellatus in pleural fluid culture, as well as an anaerobic GNR that was not identified. Failed to resolve with thoracostomy drainage and fibrinolytics, and he had VATS decortication 8/24/21. Had GARDENIA that was read as abnormal but regarded as normal findings associated with the PFO closure device according to his interventional cardiologists at [facility]   Improved slowly, ultimately discharged on IV Ertapenem    Total duration of abx to date 37d    Today, he says he is feeling great. Minimal pain L chest with yawning, deep inspiration. Not coughing. No SOB. No fever or chills.   Had drenching night sweats a few nights after DC  No N/V/D, no rash   PICC functioning as expected         REVIEW OF SYSTEMS:    CONSTITUTIONAL:   Per HPI    Some drenching night sweats few days after DC  EYES:  negative for blurred vision, eye discharge, visual disturbance and icterus  HEENT:  negative for hearing loss, tinnitus, ear drainage, sinus pressure, nasal congestion, epistaxis and snoring  RESPIRATORY:  No hemoptysis   CARDIOVASCULAR:  negative for palpitations, exertional chest pressure/discomfort, edema, syncope  GASTROINTESTINAL:  negative for nausea, vomiting, diarrhea, constipation, blood in stool and abdominal pain  GENITOURINARY:  negative for frequency, dysuria, urinary incontinence, decreased urine volume, and hematuria  HEMATOLOGIC/LYMPHATIC:  negative for easy bruising, bleeding and lymphadenopathy  ALLERGIC/IMMUNOLOGIC:  negative for recurrent infections, angioedema, anaphylaxis and drug reactions  ENDOCRINE:  negative for weight changes and diabetic symptoms including polyuria, polydipsia and polyphagia  MUSCULOSKELETAL:  negative for acute joint swelling, decreased range of motion and muscle weakness  NEUROLOGICAL:  negative for headaches, slurred speech, unilateral weakness  PSYCHIATRIC/BEHAVIORAL: negative for hallucinations, behavioral problems, confusion and agitation. Medications:    Current Outpatient Medications   Medication Sig Dispense Refill    famotidine (PEPCID) 20 MG tablet Take 1 tablet by mouth 2 times daily 180 tablet 1    metoprolol tartrate (LOPRESSOR) 25 MG tablet Take 1 tablet by mouth 2 times daily 60 tablet 3    aspirin EC 81 MG EC tablet Take 1 tablet by mouth daily 90 tablet 1    cyclobenzaprine (FLEXERIL) 5 MG tablet Take 5 mg by mouth 3 times daily as needed for Muscle spasms       No current facility-administered medications for this visit. Physical:  VITALS:  /64   Temp 98.1 °F (36.7 °C) (Oral)   Ht 6' (1.829 m)   BMI 22.97 kg/m²     General Appearance:   Alert, oriented, well appearing   Skin:   Warm, dry.   No focal rash  Mouth/Throat:   MMM, no thrush  Neck:   Supple, symmetric   Lungs:   CTA kamar without W/R/R  Heart:   RRR without M/R/G  Abdomen:   Soft, flat, NT   Extremities:  No focal rash  No edema  Vascular Access:   LUE PICC, site non-tender, without erythema or swelling         MICRO:  8/14     BC #1  S pyogenes              BC #2 neg              Throat GABHS screen +               COVID NAAT neg   8/16     COVID NAAT neg                     COVID PCR neg   8/17     BC x2 neg   8/19     Pleural fluid cultures neg, GS 3+wbc,no organisms seen  8/21     BC x2 neg    8/24     Surgical culture \"abscess\" 2+GPC/2+GPR, culture with S constellatus, anaerobic GNR               Surgical tissue culture S constellatus, GS no organisms seen      IMAGING:  CT chest 8/15/21  Impression   1. Multifocal airspace disease, including scattered nodular airspace disease. Correlate with presentation.  Follow-up to resolution recommended. 2. Suboptimal opacification of the pulmonary arteries.  No acute pulmonary   embolism to the lobar arteries given limitation. 3. Other findings as described.      CT neck 8/16/21 negative      CT L spine negative     CT CAP 8/18/21  Impression   1. Significant worsening of left lower lobe pneumonia with developing   loculated parapneumonic effusion. 2. Several rapidly enlarging right-sided subpleural pulmonary nodules. Septic emboli are a diagnostic consideration. 3. No acute abdominopelvic abnormality.      CT chest 8/23/21  Impression   Small left hydropneumothorax, with chest tube in place extending to the left   costophrenic angle.       Partial consolidation of the left lower lobe is again seen, compatible with   atelectasis or pneumonia.       Noncalcified pulmonary nodules are similar to prior, for which continued CT   follow-up to resolution is recommended. Assessment / Plan:      S pyogenes bacteremia, +BC on 8/14/21 associated with acute throat/neck pain with +throat GABHS test     Septic pulmonary emboli, empyema, lung abscess   S/p CT placement 8/19/21 with serial fibrinolytic therapy  Pleural fluid studies c/w empyema. GS no organisms seen/pleural fluid culture negative to date. VATS, decortication, evacuation of abscess 8/24/21, surgical cultures with S constellatus (beta-hemolytic strep) and anaerobic GNR.  GPR was seen on the fluid Gram's stain as well   Repeat BC are negative. GARDENIA no vegetation. Pathogenesis - ?aspiration.   Lacked apparent dental pathology        Elevated LFTs - resolved   Presume secondary to acute systemic illness        History of PFO s/p repair    GARDENIA discussed with Cardiology - lesion on the closure device in L atrium most likely the post of the device   No valvular vegetations seen.         Has had recurrent strep throat, frequently, but less than annually.   Unsure how many of those episodes were confirmed microbiologically       Plan      Clinically doing well   He will complete the planned course of abx, has 3d remaining  PICC to be removed after last dose on 9/24/21  Monitor expectantly thereafter  GARDENIA per Cardiology recommendations  He was offered ENT evaluation to revisit recurrent tonsillitis/pharyngitis to discuss tonsillectomy and declines at this time     He will call with concerns of recurrent infection after discontinuation of abx, risk of that is thought to be quite low     Questions addressed         Alec Brantley MD

## 2021-09-21 NOTE — TELEPHONE ENCOUNTER
----- Message from April Belinda sent at 9/21/2021  4:30 PM EDT -----  Subject: Message to Provider    QUESTIONS  Information for Provider? patient needs a letter stating he can go back to   work, please call patient to advise if his physician can do this for him,   dr Alesia bass, and when it is available for him.  ---------------------------------------------------------------------------  --------------  CALL BACK INFO  What is the best way for the office to contact you? OK to leave message on   voicemail  Preferred Call Back Phone Number? [phone]  ---------------------------------------------------------------------------  --------------  SCRIPT ANSWERS  Relationship to Patient?  Self

## 2021-09-22 ENCOUNTER — PATIENT MESSAGE (OUTPATIENT)
Dept: FAMILY MEDICINE CLINIC | Age: 36
End: 2021-09-22

## 2021-09-22 ENCOUNTER — CARE COORDINATION (OUTPATIENT)
Dept: CASE MANAGEMENT | Age: 36
End: 2021-09-22

## 2021-09-22 ENCOUNTER — TELEPHONE (OUTPATIENT)
Dept: INFECTIOUS DISEASES | Age: 36
End: 2021-09-22

## 2021-09-22 NOTE — LETTER
[address]  Phone: [phone]  Fax: [phone]    JAMES Reyes CNP        September 29, 2021     Patient: Maria Del Carmen Martinez   YOB: 1985   Date of Visit: 9/22/2021       To Whom it May Concern:    Marval Goldmann  may return to work on September 30, 2021. If you have any questions or concerns, please don't hesitate to call.     Sincerely,         JAMES Gore Ra - CNP

## 2021-09-22 NOTE — TELEPHONE ENCOUNTER
Duplicate message. Message sent through my chart by patient. was sent to [facility] previously.

## 2021-09-22 NOTE — TELEPHONE ENCOUNTER
OPAT patient   ELISHA to end as scheduled  Please have [facility] RN pull PICC after last dose on 9/24/21  Thanks

## 2021-09-22 NOTE — TELEPHONE ENCOUNTER
From: Molly Khoury  To: Aurora Medley, APRN - CNP  Sent: 9/22/2021 8:59 AM EDT  Subject: Visit Follow-Up Question    Im wondering who can write me a letter saying I can return to work next week? I finish my antibiotics and get my picc line removed this Friday. I have no other restrictions now.  Thanks

## 2021-09-22 NOTE — CARE COORDINATION
[facility] Transitions Follow Up Call    2021    Patient: Arpan Krishna  Patient : 1985   MRN: [id number]  Reason for Admission: strep throat  Discharge Date: 21 RARS: Readmission Risk Score: 19         Spoke with: Arpan Krishna    Patient answered call and verified . Patient seen by Dr Guero Castillo since last contact and new orders that PICC line to be pulled after last infusion. Patient stated that his last IV antibiotic infusion is scheduled tomorrow and [facility]. nurse to pull PICC line on Friday. Patient is planning on returning to work Monday of next week. Patient has reached out to Dr Guero Castillo via [employer], but has not gotten a response. CTN provided patient MD phone number and encouraged to call office to get return to work note. Patient appreciative of follow up calls, but did not feel that he needed any further calls. Denied any acute needs at present time. Educated on the use of urgent care or physicians 24 hr access line if assistance is needed after hours. Patient stable and CTN closed episode. Care Transitions Subsequent and Final Call    Subsequent and Final Calls  Do you have any ongoing symptoms?: No  Have your medications changed?: No  Do you have any questions related to your medications?: No  Do you currently have any active services?: Yes  Are you currently active with any services?: Home Health  Do you have any needs or concerns that I can assist you with?: No  Identified Barriers: None  Care Transitions Interventions  Other Interventions:            Follow Up  Future Appointments   Date Time Provider Oleg Lynn   10/19/2021  7:30 AM SCHEDULE, [facility]   3/21/2022 10:30 AM [facility] CT [facility]CT [facility] CT Herlene Bones   3/21/2022 11:15 AM Gaurav Mckeon MD AND PULM YAJAIRA Murrieta, RN

## 2021-09-23 ENCOUNTER — TELEPHONE (OUTPATIENT)
Dept: INTERNAL MEDICINE CLINIC | Age: 36
End: 2021-09-23

## 2021-09-27 ENCOUNTER — TELEPHONE (OUTPATIENT)
Dept: INFECTIOUS DISEASES | Age: 36
End: 2021-09-27

## 2021-09-27 LAB
FUNGUS (MYCOLOGY) CULTURE: NORMAL
FUNGUS (MYCOLOGY) CULTURE: NORMAL
FUNGUS STAIN: NORMAL
FUNGUS STAIN: NORMAL

## 2021-09-29 ENCOUNTER — TELEPHONE (OUTPATIENT)
Dept: INFECTIOUS DISEASES | Age: 36
End: 2021-09-29

## 2021-09-29 NOTE — TELEPHONE ENCOUNTER
Patient requesting a note to return to work. Patient reports he has called office and requested a note several times but has not received a response. Please advise.

## 2021-09-29 NOTE — TELEPHONE ENCOUNTER
Meena Roche sent him an email with work note. Spoke to patient and sent email to him with work note.

## 2021-09-29 NOTE — TELEPHONE ENCOUNTER
Ok to return to work without restrictions from my standpoint.   I cannot speak to restrictions, if any, imposed by his surgeon     Can you please draft a letter for him   Thanks

## 2021-09-29 NOTE — TELEPHONE ENCOUNTER
Patient continues to need a note to return to work. Patient reports he has called several times but has not received a response. Please advise.

## 2021-10-12 LAB
AFB CULTURE (MYCOBACTERIA): NORMAL
AFB CULTURE (MYCOBACTERIA): NORMAL
AFB SMEAR: NORMAL
AFB SMEAR: NORMAL

## 2021-10-18 NOTE — PRE-PROCEDURE INSTRUCTIONS
Called patient about procedure. Told to be here at 0600 for procedure at 0730. Must be NPO after midnight but can take morning medication with sips of water. Told to have a responsible adult with them to take them home and stay with them afterwards, if they do not get admitted to hospital. Also, to bring a current list of medications. No other questions or concerns.

## 2021-10-19 ENCOUNTER — HOSPITAL ENCOUNTER (OUTPATIENT)
Dept: NON INVASIVE DIAGNOSTICS | Age: 36
Discharge: HOME | End: 2021-10-19
Payer: COMMERCIAL

## 2021-10-19 VITALS — BODY MASS INDEX: 24.38 KG/M2 | HEIGHT: 72 IN | TEMPERATURE: 97.7 F | WEIGHT: 180 LBS

## 2021-10-19 DIAGNOSIS — I63.332 CEREBROVASCULAR ACCIDENT (CVA) DUE TO THROMBOSIS OF LEFT POSTERIOR CEREBRAL ARTERY (HCC): ICD-10-CM

## 2021-10-19 DIAGNOSIS — Q21.12 PFO (PATENT FORAMEN OVALE): ICD-10-CM

## 2021-10-19 LAB
EKG ATRIAL RATE: 63 BPM
EKG DIAGNOSIS: NORMAL
EKG P AXIS: 58 DEGREES
EKG P-R INTERVAL: 180 MS
EKG Q-T INTERVAL: 410 MS
EKG QRS DURATION: 96 MS
EKG QTC CALCULATION (BAZETT): 419 MS
EKG R AXIS: -4 DEGREES
EKG T AXIS: 35 DEGREES
EKG VENTRICULAR RATE: 63 BPM
SARS-COV-2, NAAT: NOT DETECTED

## 2021-10-19 PROCEDURE — 99152 MOD SED SAME PHYS/QHP 5/>YRS: CPT

## 2021-10-19 PROCEDURE — 93312 ECHO TRANSESOPHAGEAL: CPT

## 2021-10-19 PROCEDURE — 93320 DOPPLER ECHO COMPLETE: CPT

## 2021-10-19 PROCEDURE — 85610 PROTHROMBIN TIME: CPT

## 2021-10-19 PROCEDURE — 87635 SARS-COV-2 COVID-19 AMP PRB: CPT

## 2021-10-19 PROCEDURE — 93325 DOPPLER ECHO COLOR FLOW MAPG: CPT

## 2021-10-19 PROCEDURE — 93005 ELECTROCARDIOGRAM TRACING: CPT | Performed by: INTERNAL MEDICINE

## 2021-10-19 PROCEDURE — 93010 ELECTROCARDIOGRAM REPORT: CPT | Performed by: INTERNAL MEDICINE

## 2021-10-20 LAB — INR BLD: 1.2 (ref 0.86–1.14)

## 2021-10-20 PROCEDURE — 93325 DOPPLER ECHO COLOR FLOW MAPG: CPT

## 2021-10-20 PROCEDURE — 93312 ECHO TRANSESOPHAGEAL: CPT

## 2021-10-20 PROCEDURE — 93320 DOPPLER ECHO COMPLETE: CPT

## 2021-10-20 PROCEDURE — 99152 MOD SED SAME PHYS/QHP 5/>YRS: CPT

## 2021-10-20 NOTE — H&P
Reason for Referral: CVA s/p PFO closure      CC: \"I was really sick. \"        Subjective:      History of Present Illness:     Edvin Esparza is a 30 y.o. patient with a PMH significant for significant no medical problems [facility] with increasing acute headache followed by vision loss and decreased peripheral vision. No numbness no tingling. These symptoms started acutely without any aggravating factors. There were no relieving factors. A CT and MRI investigation revealed new stroke with occlusion of left PCA stroke and left ischemic posterior temporal stroke. S/p PFO closure 5/9/19.      Interval history: Today, he is here for follow up s/p PFO. He was recently hospitalized for a bacterial infection with unknown reason. He says that his white count his going back down. He has a PICC line and continues to get IV antibiotics. He does report increased fatigue but says that it is improving. Patient denies exertional chest pain/pressure, dyspnea at rest, CASAS, PND, orthopnea, palpitations, lightheadedness, weight changes, changes in LE edema, and syncope. Patient reports compliance to his medications.      Past Medical History:   has a past medical history of CVA (cerebral vascular accident) ([address] Hernia, History of bone graft, PFO (patent foramen ovale), and TMJ (dislocation of temporomandibular joint).     Surgical History:   has a past surgical history that includes bone graft; Hand surgery; Anterior cruciate ligament repair (Left, 09/10/2014); Cardiac surgery (2019); pre-malignant / benign skin lesion excision (N/A, 09/18/2020); transesophageal echocardiogram (08/19/2021); CT GUIDED PLEURAL DRAINAGE W CATH PERC (8/19/2021); and Thoracoscopy (Left, 8/24/2021).    Social History:   reports that he quit smoking about 2 years ago. His smoking use included cigarettes. He smoked 0.33 packs per day. He has quit using smokeless tobacco.  His smokeless tobacco use included snuff.  He reports current alcohol use of about 6.0 standard drinks of alcohol per week. He reports that he does not use drugs.      Family History:  family history includes Cancer in his maternal grandfather, maternal grandmother, paternal grandfather, and paternal grandmother; Heart Failure in his maternal grandfather; High Blood Pressure in his paternal grandmother; Other in his paternal grandfather.     Home Medications:  Were reviewed and are listed in nursing record and/or below  Home Medications           Prior to Admission medications    Medication Sig Start Date End Date Taking? Authorizing Provider   gabapentin (NEURONTIN) 100 MG capsule Take 1 capsule by mouth 3 times daily for 3 days. 8/27/21 9/2/21 Yes Itzel Shen MD   metoprolol tartrate (LOPRESSOR) 25 MG tablet Take 1 tablet by mouth 2 times daily 8/27/21   Yes Itzel Shen MD   famotidine (PEPCID) 20 MG tablet Take 1 tablet by mouth 2 times daily 8/27/21   Yes Itzel Shen MD   aspirin EC 81 MG EC tablet Take 1 tablet by mouth daily 11/3/20   Yes Danette Guillen MD            CURRENT Medications:    Current Meds Link used for Sign Out Report   No current facility-administered medications for this visit.         Allergies:  Patient has no known allergies.         Review of Systems: all reviewed and refer to HPI   · Constitutional: no unanticipated weight loss. There's been no change in energy level, sleep pattern, or activity level. No fevers, chills. · Eyes: No visual changes or diplopia. No scleral icterus. · ENT: No Headaches, hearing loss or vertigo. No mouth sores or sore throat. · Cardiovascular: + Chest pain, tightness or discomfort and CASAS. · No Shortness of breath. No Dyspnea on exertion, Orthopnea, Paroxysmal nocturnal dyspnea or breathlessness at rest.  · No Palpitations. · No Syncope ('blackouts', 'faints', 'collapse') or dizziness. · Respiratory: No cough or wheezing, no sputum production. No hematemesis.     · Gastrointestinal: No abdominal pain, appetite loss, blood in stools. No change in bowel or bladder habits. · Genitourinary: No dysuria, trouble voiding, or hematuria. · Musculoskeletal:  No gait disturbance, no joint complaints. · Integumentary: No rash or pruritis. · Neurological: No headache, diplopia, change in muscle strength, numbness or tingling. · Psychiatric: No anxiety or depression. · Endocrine: No temperature intolerance. No excessive thirst, fluid intake, or urination. No tremor. · Hematologic/Lymphatic: No abnormal bruising or bleeding, blood clots or swollen lymph nodes. · Allergic/Immunologic: No nasal congestion or hives.        Objective:      PHYSICAL EXAM:       Vitals       Vitals:     09/02/21 1244   BP: 118/74   Pulse: 75   SpO2: 99%   Weight: 157 lb (71.2 kg)   Height: 6' (1.829 m)          Weight: 157 lb (71.2 kg)       General Appearance:  Alert, cooperative, no distress, appears stated age. Head:  Normocephalic, without obvious abnormality, atraumatic. Eyes:  Pupils equal and round. No scleral icterus. Mouth: Moist mucosa, no pharyngeal erythema. Nose: Nares normal. No drainage or sinus tenderness. Neck: Supple, symmetrical, trachea midline. No adenopathy. No tenderness/mass/nodules. No carotid bruit or elevated JVD. Lungs:   Respiratory Effort: Normal   Auscultation: Clear to auscultation bilaterally, respirations unlabored. No wheeze, rales   Chest Wall:  No tenderness or deformity. Cardiovascular:     Pulses  Palpation: normal   Ascultation: Regular rate, S1/ S2 normal. No murmur, rub, or gallop. 2+ radial and pedal pulses, symmetric  Carotid  Femoral   Abdomen and Gastrointestinal:   Soft, non-tender, bowel sounds active. Liver and Spleen  Masses   Musculoskeletal: No muscle wasting  Back  Gait   Extremities: Extremities normal, atraumatic. No cyanosis or edema. No cyanosis clubbing         Skin: Inspection and palpation performed, no rashes or lesions. Pysch: Normal mood and affect.  Alert and oriented to time place person   Neurologic: Normal gross motor and sensory exam.       Labs      All labs have been reviewed           Lab Results   Component Value Date     WBC 10.4 09/02/2021     RBC 3.16 09/02/2021     HGB 10.0 09/02/2021     HCT 29.3 09/02/2021     MCV 92.9 09/02/2021     RDW 15.6 09/02/2021     PLT 1,189 09/02/2021            Lab Results   Component Value Date      08/31/2021     K 5.2 08/31/2021     K 4.6 08/26/2021      08/31/2021     CO2 23 08/31/2021     BUN 15 08/31/2021     CREATININE 0.81 08/31/2021     GFRAA 132 08/31/2021     GFRAA >60 03/07/2010     AGRATIO 0.7 08/31/2021     LABGLOM 109 08/31/2021     GLUCOSE 82 08/31/2021     PROT 7.4 08/31/2021     PROT 7.0 03/07/2010     CALCIUM 8.5 08/31/2021     BILITOT 0.2 08/31/2021     ALKPHOS 169 08/31/2021     AST 66 08/31/2021      08/31/2021      No results found for: PTINR        Lab Results   Component Value Date     LABA1C 5.6 08/14/2020            Lab Results   Component Value Date     TROPONINI <0.01 08/14/2021         Cardiac, Vascular and Imaging Data all Personally Reviewed in Detail by Myself          Echo:6/19/19  Summary  Normal left ventricular systolic function with a visually estimated ejection  fraction of 55%. No regional wall motion abnormalities are seen. Normal left ventricular diastolic filling pressure. Mild mitral regurgitation. A 25 mm Amplatzer closure device is visualized and appears well seated with  no evidence of shunting noted by color flow doppler of the interatrial  septum. A bubble study was performed and fails to show evidence of shunting.     ECHO 6/2/2020  Post Amplater closure device (s/p one year).   Normal left ventricular systolic function with a visually estimated ejection  fraction of 55%. No regional wall motion abnormalities are seen. Normal left ventricular diastolic filling pressure. Mild mitral and tricuspid regurgitation.   Systolic pulmonary artery pressure (SPAP) is normal and estimated at 20 mmHg  (right atrial pressure 3 mmHg). A 25 mm Amplatzer closure device is visualized and appears well seated with  no evidence of shunting noted by color flow doppler of the interatrial  septum. A bubble study was performed and fails to show evidence of shunting.     ECHO 5/11/2021  A 25 mm Amplatzer closure device is visualized and appears well seated with  no evidence of shunting noted by color flow doppler of the interatrial  septum. An agitated saline study was performed and showed a delayed (>5 beats)  positive result suggestive of an intrapulmonary shunt. Normal left ventricle size, wall thickness, and systolic function with a  visually estimated ejection fraction of 55%. No regional wall motion abnormalities are seen. Normal left ventricular diastolic filling pressure. Inadequate tricuspid valve regurgitation to estimate systolic pulmonary  artery pressure.     ECHO 8/18/2021  Normal LV systolic function with a visually estimated ejection fraction of  55-60%. No regional wall motion abnormalities are seen. EF calculated by 3D at 59%. A 25 mm Amplatzer closure device is visualized and appears well seated. No  obvious shunt from color flow. Normal left ventricular diastolic filling pressure. Mild eccentric tricuspid regurgitation. Systolic pulmonary artery pressure (SPAP) is elevated and estimated at 60  mmHg (RAP 15 mmHg) c/w mod-severe pulmonary hypertension. No obvious masses, thrombi, or vegetations are noted.     [facility] 8/19/2021  A 25 mm Amplatzer closure device is visualized and appears well seated with  no evidence of shunting noted by color flow doppler of the interatrial  septum. An agitated saline study was performed and showed a delayed (>5 beats)  positive result suggestive of an intrapulmonary shunt. Normal left ventricle size, wall thickness, and systolic function with a  visually estimated ejection fraction of 55%.   No regional wall motion abnormalities are seen. Normal left ventricular diastolic filling pressure. Inadequate tricuspid valve regurgitation to estimate systolic pulmonary  artery pressure.        Assessment and Plan        ASA 2  Mallampati 2       PFO s/p CVA   S/p PFO closure 25 mm occluder device 5/9/19. Asymptomatic. Continue Asa and statin. Recent hospitalization for bacterial infection. Repeat [facility] in 6 weeks to assess device.      Smoking addiction   He has now quit smoking.      Follow up in 1 year or sooner.     Thank you for allowing us to participate in the care of Robert Cerda.   Please do not hesitate to contact me if you have any questions.     Huyen Ayers MD, MPH

## 2021-10-22 ENCOUNTER — TELEPHONE (OUTPATIENT)
Dept: CARDIOLOGY CLINIC | Age: 36
End: 2021-10-22

## 2021-10-22 NOTE — TELEPHONE ENCOUNTER
Dr. Ayaan Briceno,    Please complete [facility] Result note. Patient seeing PCP soon and requesting the results. Thanks,  Jack Grya RN  .

## 2021-10-22 NOTE — TELEPHONE ENCOUNTER
Johanna Perez calling from echo dept stating that pts pcp called her stating that they need the [facility] read, pt has an apt with his pcp soon and they need the results.

## 2021-10-27 ENCOUNTER — TELEPHONE (OUTPATIENT)
Dept: CARDIOTHORACIC SURGERY | Age: 36
End: 2021-10-27

## 2021-10-27 NOTE — TELEPHONE ENCOUNTER
Called patient to inform him that Dr. Valentina Amezcua reviewed patient's ECHO results. No further intervention or orders from cardiothoracic standpoint. Patient able to follow up with our office on a PRN basis. Patient agreeable.

## 2021-11-24 NOTE — TELEPHONE ENCOUNTER
Patient was informed of G[facility] results per Dr. Heriberto Taylor office after reviewing. He will follow up with Dr. Nash Marlow yearly and CVTS PRN.

## 2022-01-06 ENCOUNTER — TELEPHONE (OUTPATIENT)
Dept: FAMILY MEDICINE CLINIC | Age: 37
End: 2022-01-06

## 2022-01-06 RX ORDER — PERMETHRIN 50 MG/G
CREAM TOPICAL
Qty: 60 G | Refills: 1 | Status: SHIPPED | OUTPATIENT
Start: 2022-01-06

## 2022-01-06 NOTE — TELEPHONE ENCOUNTER
Received call from patients stating that kate's son was diagnosed with scabies yesterday and Pt was advised to be tested.      Please advise

## 2022-04-04 ENCOUNTER — TELEPHONE (OUTPATIENT)
Dept: PULMONOLOGY | Age: 37
End: 2022-04-04

## 2022-04-04 NOTE — TELEPHONE ENCOUNTER
Patient cancelled appointment on 4/26/22 with Dr. Vivek Gonzalez for CT fu [facility] pt). Reason: Pt cannot afford and also states he has too much going on right now. Patient did not reschedule appointment. Appointment rescheduled:  Pt said he will call back at a later time when he is able.

## 2023-01-23 NOTE — PROGRESS NOTES
[facility]  Cardiology Progress Note    Ethel Garzon  [birth year] January 26, 2023      Reason for Referral: CVA s/p PFO closure     CC: \"no complaints\"      Subjective:     History of Present Illness:    Ethel Garzon is a 40 y.o. patient with a PMH significant for significant no medical problems presenting to the [facility] 3/2019 with increasing acute headache followed by vision loss and decreased peripheral vision. No numbness no tingling. These symptoms started acutely without any aggravating factors. There were no relieving factors. A CT and MRI investigation revealed new stroke with occlusion of left PCA stroke and left ischemic posterior temporal stroke. S/p PFO closure 5/9/19. Interval history: Today, he is here for follow up. He has been trying to get over a cold. He denies any new stroke like symptoms. He does not want to get any testing at this time due to cost. Patient denies exertional chest pain/pressure, dyspnea at rest, CASAS, PND, orthopnea, palpitations, lightheadedness, weight changes, changes in LE edema, and syncope. Past Medical History:   has a past medical history of CVA (cerebral vascular accident) ([address] Hernia, History of bone graft, PFO (patent foramen ovale), and TMJ (dislocation of temporomandibular joint). Surgical History:   has a past surgical history that includes bone graft; Hand surgery; Anterior cruciate ligament repair (Left, 09/10/2014); Cardiac surgery (2019); pre-malignant / benign skin lesion excision (N/A, 09/18/2020); transesophageal echocardiogram (08/19/2021); CT GUIDED PLEURAL DRAINAGE W CATH PERC (8/19/2021); and Thoracoscopy (Left, 8/24/2021). Social History:   reports that he quit smoking about 3 years ago. His smoking use included cigarettes. He smoked an average of .33 packs per day. He has quit using smokeless tobacco.  His smokeless tobacco use included snuff. He reports current alcohol use of about 6.0 standard drinks per week. He reports that he does not use drugs. Family History:  family history includes Cancer in his maternal grandfather, maternal grandmother, paternal grandfather, and paternal grandmother; Heart Failure in his maternal grandfather; High Blood Pressure in his paternal grandmother; Other in his paternal grandfather. Home Medications:  Were reviewed and are listed in nursing record and/or below  Prior to Admission medications    Medication Sig Start Date End Date Taking? Authorizing Provider   permethrin (ELIMITE) 5 % cream Apply topically as directed. Put cream on at night and wash off in the morning and 1 week out  Patient not taking: Reported on 1/26/2023 1/6/22   Elvia Tay APRN - CNP   cyclobenzaprine (FLEXERIL) 5 MG tablet Take 5 mg by mouth 3 times daily as needed for Muscle spasms    Historical Provider, MD   metoprolol tartrate (LOPRESSOR) 25 MG tablet Take 1 tablet by mouth 2 times daily  Patient not taking: Reported on 1/26/2023 8/27/21   Gian Stevens MD   aspirin EC 81 MG EC tablet Take 1 tablet by mouth daily  Patient not taking: Reported on 1/26/2023 11/3/20   Prem Murphy MD        CURRENT Medications:  No current facility-administered medications for this visit. Allergies:  Patient has no known allergies. Review of Systems: all reviewed and refer to HPI   Constitutional: no unanticipated weight loss. There's been no change in energy level, sleep pattern, or activity level. No fevers, chills. Eyes: No visual changes or diplopia. No scleral icterus. ENT: No Headaches, hearing loss or vertigo. No mouth sores or sore throat. Cardiovascular: + Chest pain, tightness or discomfort and CASAS. No Shortness of breath. No Dyspnea on exertion, Orthopnea, Paroxysmal nocturnal dyspnea or breathlessness at rest.  No Palpitations. No Syncope ('blackouts', 'faints', 'collapse') or dizziness. Respiratory: No cough or wheezing, no sputum production. No hematemesis.     Gastrointestinal: No abdominal pain, appetite loss, blood in stools. No change in bowel or bladder habits. Genitourinary: No dysuria, trouble voiding, or hematuria. Musculoskeletal:  No gait disturbance, no joint complaints. Integumentary: No rash or pruritis. Neurological: No headache, diplopia, change in muscle strength, numbness or tingling. Psychiatric: No anxiety or depression. Endocrine: No temperature intolerance. No excessive thirst, fluid intake, or urination. No tremor. Hematologic/Lymphatic: No abnormal bruising or bleeding, blood clots or swollen lymph nodes. Allergic/Immunologic: No nasal congestion or hives. Objective:     PHYSICAL EXAM:      Vitals:    01/26/23 0917   BP: 110/70   Pulse: 70   SpO2: 100%   Weight: 173 lb (78.5 kg)        Weight: 173 lb (78.5 kg)       General Appearance:  Alert, cooperative, no distress, appears stated age. Head:  Normocephalic, without obvious abnormality, atraumatic. Eyes:  Pupils equal and round. No scleral icterus. Mouth: Moist mucosa, no pharyngeal erythema. Nose: Nares normal. No drainage or sinus tenderness. Neck: Supple, symmetrical, trachea midline. No adenopathy. No tenderness/mass/nodules. No carotid bruit or elevated JVD. Lungs:   Respiratory Effort: Normal   Auscultation: Clear to auscultation bilaterally, respirations unlabored. No wheeze, rales   Chest Wall:  No tenderness or deformity. Cardiovascular:    Pulses  Palpation: normal   Ascultation: Regular rate, S1/ S2 normal. No murmur, rub, or gallop. 2+ radial and pedal pulses, symmetric  Carotid  Femoral   Abdomen and Gastrointestinal:   Soft, non-tender, bowel sounds active. Liver and Spleen  Masses   Musculoskeletal: No muscle wasting  Back  Gait   Extremities: Extremities normal, atraumatic. No cyanosis or edema. No cyanosis clubbing       Skin: Inspection and palpation performed, no rashes or lesions. Pysch: Normal mood and affect.  Alert and oriented to time place person   Neurologic: Normal gross motor and sensory exam.       Labs     All labs have been reviewed    Lab Results   Component Value Date/Time    WBC 6.3 09/20/2021 04:35 PM    RBC 3.94 09/20/2021 04:35 PM    HGB 11.9 09/20/2021 04:35 PM    HCT 36.2 09/20/2021 04:35 PM    MCV 91.9 09/20/2021 04:35 PM    RDW 14.7 09/20/2021 04:35 PM     09/20/2021 04:35 PM     Lab Results   Component Value Date/Time     09/20/2021 04:35 PM    K 4.5 09/20/2021 04:35 PM    K 4.6 08/26/2021 07:10 AM     09/20/2021 04:35 PM    CO2 28 09/20/2021 04:35 PM    BUN 11 09/20/2021 04:35 PM    CREATININE 0.8 09/20/2021 04:35 PM    GFRAA >60 09/20/2021 04:35 PM    GFRAA >60 03/07/2010 07:10 PM    AGRATIO 1.3 09/20/2021 04:35 PM    LABGLOM >60 09/20/2021 04:35 PM    GLUCOSE 124 09/20/2021 04:35 PM    PROT 7.2 09/20/2021 04:35 PM    PROT 7.0 03/07/2010 07:10 PM    CALCIUM 8.6 09/20/2021 04:35 PM    BILITOT <0.2 09/20/2021 04:35 PM    ALKPHOS 114 09/20/2021 04:35 PM    AST 18 09/20/2021 04:35 PM    ALT 37 09/20/2021 04:35 PM     No results found for: PTINR  Lab Results   Component Value Date    LABA1C 5.6 08/14/2020     Lab Results   Component Value Date    TROPONINI <0.01 08/14/2021       Cardiac, Vascular and Imaging Data all Personally Reviewed in Detail by Myself         Echo:6/19/19  Summary  Normal left ventricular systolic function with a visually estimated ejection  fraction of 55%. No regional wall motion abnormalities are seen. Normal left ventricular diastolic filling pressure. Mild mitral regurgitation. A 25 mm Amplatzer closure device is visualized and appears well seated with  no evidence of shunting noted by color flow doppler of the interatrial  septum. A bubble study was performed and fails to show evidence of shunting. ECHO 6/2/2020  Post Amplater closure device (s/p one year). Normal left ventricular systolic function with a visually estimated ejection  fraction of 55%.   No regional wall motion abnormalities are seen.  Normal left ventricular diastolic filling pressure. Mild mitral and tricuspid regurgitation. Systolic pulmonary artery pressure (SPAP) is normal and estimated at 20 mmHg  (right atrial pressure 3 mmHg). A 25 mm Amplatzer closure device is visualized and appears well seated with  no evidence of shunting noted by color flow doppler of the interatrial  septum. A bubble study was performed and fails to show evidence of shunting. ECHO 5/11/2021  A 25 mm Amplatzer closure device is visualized and appears well seated with  no evidence of shunting noted by color flow doppler of the interatrial  septum. An agitated saline study was performed and showed a delayed (>5 beats)  positive result suggestive of an intrapulmonary shunt. Normal left ventricle size, wall thickness, and systolic function with a  visually estimated ejection fraction of 55%. No regional wall motion abnormalities are seen. Normal left ventricular diastolic filling pressure. Inadequate tricuspid valve regurgitation to estimate systolic pulmonary  artery pressure. ECHO 8/18/2021  Normal LV systolic function with a visually estimated ejection fraction of  55-60%. No regional wall motion abnormalities are seen. EF calculated by 3D at 59%. A 25 mm Amplatzer closure device is visualized and appears well seated. No  obvious shunt from color flow. Normal left ventricular diastolic filling pressure. Mild eccentric tricuspid regurgitation. Systolic pulmonary artery pressure (SPAP) is elevated and estimated at 60  mmHg (RAP 15 mmHg) c/w mod-severe pulmonary hypertension. No obvious masses, thrombi, or vegetations are noted. [place] 8/19/2021  A 25 mm Amplatzer closure device is visualized and appears well seated with  no evidence of shunting noted by color flow doppler of the interatrial  septum. An agitated saline study was performed and showed a delayed (>5 beats)  positive result suggestive of an intrapulmonary shunt.   Normal left ventricle size, wall thickness, and systolic function with a  visually estimated ejection fraction of 55%. No regional wall motion abnormalities are seen. Normal left ventricular diastolic filling pressure. Inadequate tricuspid valve regurgitation to estimate systolic pulmonary  artery pressure. [place] 10/19/2021  Limited echo. A 25mm Amplatzer device appears well seated with no evidence of shunting by  color flow. LVEF is preserved    Assessment and Plan      PFO s/p CVA   S/p PFO closure 25 mm occluder device 5/9/19. Asymptomatic. Continue Asa and statin. Repeat echocardiogram when he is ready to schedule. Smoking addiction   He has now quit smoking. Follow up in 1 year. Thank you for allowing us to participate in the care of Idris Salinas. Please do not hesitate to contact me if you have any questions. Huyen Ayers MD, MPH    [facility], [address] [place], [place] [address]acock   [address]  Ph: [phone]  Fax: [phone]      This note was scribed in the presence of Dr Nora Cordova, by Marlene Chavez RN  Physician Attestation:  The scribes documentation has been prepared under my direction and personally reviewed by me in its entirety. I confirm that the note above accurately reflects all work, treatment, procedures, and medical decision making performed by me.

## 2023-01-26 ENCOUNTER — OFFICE VISIT (OUTPATIENT)
Dept: CARDIOLOGY CLINIC | Age: 38
End: 2023-01-26
Payer: COMMERCIAL

## 2023-01-26 VITALS
OXYGEN SATURATION: 100 % | SYSTOLIC BLOOD PRESSURE: 110 MMHG | BODY MASS INDEX: 23.46 KG/M2 | WEIGHT: 173 LBS | DIASTOLIC BLOOD PRESSURE: 70 MMHG | HEART RATE: 70 BPM

## 2023-01-26 DIAGNOSIS — Q21.12 PFO (PATENT FORAMEN OVALE): ICD-10-CM

## 2023-01-26 DIAGNOSIS — I63.332 CEREBROVASCULAR ACCIDENT (CVA) DUE TO THROMBOSIS OF LEFT POSTERIOR CEREBRAL ARTERY (HCC): Primary | ICD-10-CM

## 2023-01-26 PROCEDURE — 99213 OFFICE O/P EST LOW 20 MIN: CPT | Performed by: INTERNAL MEDICINE

## (undated) DEVICE — SURGICAL PROCEDURE PACK IV U-BAR

## (undated) DEVICE — SUTURE MCRYL + SZ 4-0 L18IN ABSRB UD L19MM PS-2 3/8 CIR MCP496G

## (undated) DEVICE — ELECTRODE PT RET AD L9FT HI MOIST COND ADH HYDRGEL CORDED

## (undated) DEVICE — STERILE LATEX POWDER-FREE SURGICAL GLOVESWITH NITRILE COATING: Brand: PROTEXIS

## (undated) DEVICE — ELECTRODE LAP L36CM PTFE WIRE J HK CLEANCOAT

## (undated) DEVICE — DRAIN SURG 24FR L5/16IN DIA8MM SIL RND HUBLESS FULL FLUT

## (undated) DEVICE — ANTI-FOG SOLUTION WITH FOAM PAD: Brand: DEVON

## (undated) DEVICE — TROCAR: Brand: KII FIOS FIRST ENTRY

## (undated) DEVICE — GLOVE,SURG,SENSICARE,ALOE,LF,PF,7: Brand: MEDLINE

## (undated) DEVICE — Device

## (undated) DEVICE — CONNECTOR PERF W0.25XH3/8IN BASE Y SHP REDUC W/O LUERLOCK

## (undated) DEVICE — SUTURE VCRL SZ 3-0 L27IN ABSRB UD L26MM SH 1/2 CIR J416H

## (undated) DEVICE — Z DISCONTINUED APPLICATOR SURG PREP 0.35OZ 2% CHG 70% ISO ALC W/ HI LT

## (undated) DEVICE — [HIGH FLOW INSUFFLATOR,  DO NOT USE IF PACKAGE IS DAMAGED,  KEEP DRY,  KEEP AWAY FROM SUNLIGHT,  PROTECT FROM HEAT AND RADIOACTIVE SOURCES.]: Brand: PNEUMOSURE

## (undated) DEVICE — SUTURE VCRL SZ 3-0 L27IN ABSRB UD L36MM CT-1 1/2 CIR J258H

## (undated) DEVICE — SUTURE NONABSORBABLE MONOFILAMENT 4-0 RB-1 36 IN BLU PROLENE 8557H

## (undated) DEVICE — MINOR SET UP PK

## (undated) DEVICE — SUTURE PERMA-HAND SZ 0 L18IN NONABSORBABLE BLK L30MM FSL 678G

## (undated) DEVICE — THORACIC CATHETER, STRAIGHT, SILICONE, WITH CLOTSTOP®: Brand: AXIOM® ATRAUM™ WITH CLOTSTOP®

## (undated) DEVICE — TRAP,MUCUS SPECIMEN, 80CC: Brand: MEDLINE

## (undated) DEVICE — GOWN,SIRUS,POLYRNF,SETINSLV,L,20/CS: Brand: MEDLINE

## (undated) DEVICE — SUTURE MCRYL SZ 4-0 L18IN ABSRB UD L19MM PS-2 3/8 CIR PRIM Y496G

## (undated) DEVICE — SOLUTION IV IRRIG POUR BRL 0.9% SODIUM CHL 2F7124

## (undated) DEVICE — Z DUP USE 2257490 ADHESIVE SKIN CLSRE 036ML TPCL 2CTL CNCRLTE HIGH VSCSTY DRMB

## (undated) DEVICE — CHLORAPREP 26ML ORANGE

## (undated) DEVICE — SYRINGE MED 50ML LUERLOCK TIP

## (undated) DEVICE — SUTURE VCRL SZ 0 L36IN ABSRB UD CT-1 L36MM 1/2 CIR TAPR PNT VCP946H

## (undated) DEVICE — SMARTGOWN BREATHABLE SURGICAL GOWN: Brand: CONVERTORS

## (undated) DEVICE — SUTURE VCRL SZ 3-0 L18IN ABSRB UD L26MM SH 1/2 CIR J864D

## (undated) DEVICE — STAPLER INT L16CM STD UNIV RELD DISP TRI-STAPLE ENDO GIA

## (undated) DEVICE — 1200CC HIFLOW SUCTION CANISTER WITH AEROSTAT FILTER, FLOAT VALVE SHUTOFF WITH GREEN LID: Brand: BEMIS

## (undated) DEVICE — TROCAR: Brand: KII SLEEVE

## (undated) DEVICE — ELECTRODE,ECG,STRESS,FOAM,3PK: Brand: MEDLINE

## (undated) DEVICE — GLOVE SURG SZ 75 L12IN FNGR THK94MIL STD WHT LTX FREE